# Patient Record
Sex: FEMALE | Race: WHITE | Employment: PART TIME | URBAN - METROPOLITAN AREA
[De-identification: names, ages, dates, MRNs, and addresses within clinical notes are randomized per-mention and may not be internally consistent; named-entity substitution may affect disease eponyms.]

---

## 2017-01-17 ENCOUNTER — HOSPITAL ENCOUNTER (OUTPATIENT)
Dept: MAMMOGRAPHY | Age: 62
Discharge: HOME | End: 2017-01-17
Attending: NURSE PRACTITIONER
Payer: COMMERCIAL

## 2017-01-17 DIAGNOSIS — Z12.31 ENCOUNTER FOR SCREENING MAMMOGRAM FOR BREAST CANCER: ICD-10-CM

## 2017-01-17 PROCEDURE — 77067 SCR MAMMO BI INCL CAD: CPT

## 2017-01-19 NOTE — PROGRESS NOTES
The pt returned my call. She stated she had not been notified of her results. So, I notified the pt of the results/recommendations. She stated she really did not see the need for the MRI, just b/c the AMA made a new recommendation. She would like to do the Dx Mammogram and possible Ultrasound FIRST, and the MRI if something shows up on the Dx Mammogram, but until then she would like to hold off on the MRI. I told the pt I would call [facility] to see if they had already scheduled these tests, and if not we would set them up.

## 2017-01-19 NOTE — PROGRESS NOTES
I called [facility] [place] [facility] Radiology Scheduling ([phone]). I asked if the Dx Mammo, Ultrasound, and MRI had been scheduled. The radiology tech stated there is a que in for the  to call the pt and set these up. I notified her at that point that the pt does not want to have the MRI done at this time. She stated she would notate this in the chart. I called and notified the pt that someone from [address] [place] [address] should be calling her in the next day or two. I told her if she does not hear from them to call us back and we will set this up. The pt verbalized understanding.

## 2017-01-19 NOTE — PROGRESS NOTES
Please call patient and see if she has been contacted by Andrea Diaz for additional imaging of her right breast for further evaluation. Also, they recommend an MRI of breast please see if they have arranged this.    Thank you

## 2017-01-26 ENCOUNTER — HOSPITAL ENCOUNTER (OUTPATIENT)
Dept: MAMMOGRAPHY | Age: 62
Discharge: HOME | End: 2017-01-26
Attending: NURSE PRACTITIONER
Payer: COMMERCIAL

## 2017-01-26 DIAGNOSIS — Z80.3 FH: BREAST CANCER IN FIRST DEGREE RELATIVE WHEN <50 YEARS OLD: ICD-10-CM

## 2017-01-26 DIAGNOSIS — R92.8 ABNORMAL SCREENING MAMMOGRAM: ICD-10-CM

## 2017-01-26 PROCEDURE — 77065 DX MAMMO INCL CAD UNI: CPT

## 2017-01-26 PROCEDURE — 76642 ULTRASOUND BREAST LIMITED: CPT

## 2017-01-26 NOTE — PROGRESS NOTES
I called and notified the pts , Michael Caruso (on release), of these results, as the pt was not at home when I called. He verbalized understanding and agreed to pass the information along to the pt.

## 2017-01-26 NOTE — PROGRESS NOTES
Please let patient know that I reviewed her additional views of diagnostic mammogram and US-not suspicious for malignancy. Recommend a bilateral screening mammogram in one year.    Thank you

## 2018-01-26 ENCOUNTER — HOSPITAL ENCOUNTER (OUTPATIENT)
Dept: PHYSICAL THERAPY | Age: 63
Discharge: HOME | End: 2018-01-26
Attending: FAMILY MEDICINE
Payer: COMMERCIAL

## 2018-01-26 DIAGNOSIS — M53.3 SACRO-ILIAC PAIN: ICD-10-CM

## 2018-01-26 DIAGNOSIS — M54.6 CHRONIC BILATERAL THORACIC BACK PAIN: ICD-10-CM

## 2018-01-26 DIAGNOSIS — G89.29 CHRONIC BILATERAL THORACIC BACK PAIN: ICD-10-CM

## 2018-01-26 PROCEDURE — 97110 THERAPEUTIC EXERCISES: CPT

## 2018-01-26 PROCEDURE — 97162 PT EVAL MOD COMPLEX 30 MIN: CPT

## 2018-01-26 NOTE — PROGRESS NOTES
Ambulatory/Rehab Services H2 Model Falls Risk Assessment    Risk Factor Pts. ·   Confusion/Disorientation/Impulsivity  []    4 ·   Symptomatic Depression  []   2 ·   Altered Elimination  []   1 ·   Dizziness/Vertigo  []   1 ·   Gender (Male)  []   1 ·   Any administered antiepileptics (anticonvulsants):  []   2 ·   Any administered benzodiazepines:  []   1 ·   Visual Impairment (specify):  []   1 ·   Portable Oxygen Use  []   1 ·   Orthostatic ? BP  []   1 ·   History of Recent Falls (within 3 mos.)  []   5     Ability to Rise from Chair (choose one) Pts. ·   Ability to rise in a single movement  [x]   0 ·   Pushes up, successful in one attempt  []   1 ·   Multiple attempts, but successful  []   3 ·   Unable to rise without assistance  []   4   Total: (5 or greater = High Risk) 0     Falls Prevention Plan:   []                Physical Limitations to Exercise (specify):   []                Mobility Assistance Device (type):   []                Exercise/Equipment Adaptation (specify):    ©2010 [facility] [address] Patent #[id number].  Federal Law prohibits the replication, distribution or use without written permission from [facility] [employer]

## 2018-01-26 NOTE — THERAPY EVALUATION
King Harrison  : 1955  Primary: Monnie Moritz [facility]  Secondary:  [address] at [place] [id number] [facility], [address] [place] [address],[address], [facility] [address].  [phone]   [phone]          OUTPATIENT PHYSICAL THERAPY:Initial Assessment 2018    ICD-10: Treatment Diagnosis: low back pain M54.5  Precautions/Allergies:   Review of patient's allergies indicates no known allergies. Fall Risk Score: 0 (? 5 = High Risk)  MD Orders: Eval and treat MEDICAL/REFERRING DIAGNOSIS:  Chronic bilateral thoracic back pain [M54.6, G89.29]  Sacro-iliac pain [M53.3]   DATE OF ONSET: 18  REFERRING PHYSICIAN: Jackie Saldivar MD  RETURN PHYSICIAN APPOINTMENT: TBD     INITIAL ASSESSMENT:  Ms. Tran Monte presents with tender and hypertonic L lumbar paraspinals and L glutes at iliac crest, poor glute strength and activation, decreased TA strength and excessive lumbar lordosis that limits ability to bend over and perform daily activities. Pt would benefit from skilled therapy to address these deficits for return to previous level of function. PROBLEM LIST (Impacting functional limitations):  1. Decreased Strength  2. Decreased ADL/Functional Activities  3. Increased Pain  4. Decreased Flexibility/Joint Mobility  5. Decreased [place] with Home Exercise Program INTERVENTIONS PLANNED:  1. Cold  2. Electrical Stimulation  3. Heat  4. Home Exercise Program (HEP)  5. Manual Therapy  6. Range of Motion (ROM)  7. Therapeutic Exercise/Strengthening   TREATMENT PLAN:  Effective Dates: 18 TO 18. Frequency/Duration: 2 times a week for 6 weeks  GOALS: (Goals have been discussed and agreed upon with patient.)  Short-Term Functional Goals: Time Frame: 4 weeks  1. Pt will be I with HEP to allow for continued progress with symptom management.   2. Pt will demonstrate 15 glute bridges without hamstring activation to demonstrate improved neuromuscular utilization of glutes for functional activities. Discharge Goals: Time Frame: 6 weeks  1. Pt will improve VIPIN score to <10 to reflect an improvement in function. 2. Pt will improve c/o pain to 2/10 to allow for improved tolerance for work duties. 3. Pt will improve glute strength to 5/5 for improved SIJ stability during functional activities. Rehabilitation Potential For Stated Goals: Good  Regarding Dea Schafer's therapy, I certify that the treatment plan above will be carried out by a therapist or under their direction. Thank you for this referral,  Malick Jane PT     Referring Physician Signature: America Maharaj MD              Date                    The information in this section was collected on 01-26-18 (except where otherwise noted). HISTORY:   History of Present Injury/Illness (Reason for Referral):  Pt is a 65 y.o. Female presenting to PT with chronic LBP. 3 weeks ago lifted something that was pretty heavy and then had some pain during intercourse and has had some pain in her left sided low back. Pain has improved since onset. In the beginning pain was a sharp catching pain and had trouble standing and walking. Is currently taking ibuprofen and meloxicam.  Has trouble finding a comfortable position but often sleeps on her side. Has been diagnosed with scoliosis in the past.  Has also been told that she has 1/4-1/2 LLI with L shorter. Has been diagnosed with osteopenia  Past Medical History/Comorbidities:   Ms. Kristal Noyola  has a past medical history of Anxiety state, unspecified; Carpal tunnel syndrome; Essential hypertension, benign; Insomnia, unspecified; Pain in joint, multiple sites; Plantar fascial fibromatosis; Postmenopausal atrophic vaginitis; and Unspecified hypothyroidism. Ms. Kristal Noyola  has a past surgical history that includes hx carpal tunnel release (Bilateral, 8/2010) and hx cataract removal (8/06).   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 , Disp: 30 Tab, Rfl: 0    temazepam (RESTORIL) 30 mg capsule, Take 1 Cap by mouth nightly as needed for Sleep. Max Daily Amount: 30 mg., Disp: 90 Cap, Rfl: 1    conjugated estrogens (PREMARIN) 0.625 mg/gram vaginal cream, Insert 1.5 g into vagina daily. , Disp: 45 g, Rfl: 11    losartan (COZAAR) 100 mg tablet, TAKE 1 TABLET BY MOUTH EVERY DAY, Disp: 90 Tab, Rfl: 3   Date Last Reviewed:  1/26/2018    Number of Personal Factors/Comorbidities that affect the Plan of Care: 1-2: MODERATE COMPLEXITY   EXAMINATION:   Observation/Orthostatic Postural Assessment:          Excessive lumbar lordosis  Palpation:          Tenderness and hypertonicity in L lumbar and thoracic paraspinals and glutes  T4-5 improved symptoms with grade III CPA  ROM:            LUMBAR SPINE    AROM    Flexion   Extension  Right Rotation  Left Rotation  Right Sidebend  Left Sidebend 90 %  80 % pain on L  80 % pain on L  80 %  65 %  65 % pain on L        Strength:          LE- 5/5 bilaterally  Except glutes 4/5  TA- 4/5  Special Tests:          SIJ CPR-  Weakly positive  Slump-  Negative  Functional Mobility:         WNL   Body Structures Involved:  1. Muscles Body Functions Affected:  1. Sensory/Pain  2. Movement Related Activities and Participation Affected:  1. General Tasks and Demands  2. Mobility   Number of elements (examined above) that affect the Plan of Care: 3: MODERATE COMPLEXITY   CLINICAL PRESENTATION:   Presentation: Evolving clinical presentation with changing clinical characteristics: MODERATE COMPLEXITY   CLINICAL DECISION MAKING:   Outcome Measure:    Tool Used: Modified Oswestry Low Back Pain Questionnaire  Score:  Initial: 15/50  Most Recent: X/50 (Date: -- )   Interpretation of Score: Each section is scored on a 0-5 scale, 5 representing the greatest disability. The scores of each section are added together for a total score of 50. Score 0 1-10 11-20 21-30 31-40 41-49 50   Modifier CH CI CJ CK CL CM CN     ? Mobility - Walking and Moving Around:     - CURRENT STATUS: CJ - 20%-39% impaired, limited or restricted    - GOAL STATUS: CI - 1%-19% impaired, limited or restricted    - D/C STATUS:  ---------------To be determined---------------    Medical Necessity:   · Patient is expected to demonstrate progress in strength and range of motion to return to previous level of function. Reason for Services/Other Comments:  · Patient continues to require present interventions due to patient's inability to prolonged stand. Use of outcome tool(s) and clinical judgement create a POC that gives a: Questionable prediction of patient's progress: MODERATE COMPLEXITY            TREATMENT:   (In addition to Assessment/Re-Assessment sessions the following treatments were rendered)  Pre-treatment Symptoms/Complaints:  Low back pain  Pain: Initial:   Pain Intensity 1: 5  Post Session:  4/10     THERAPEUTIC EXERCISE: (15 minutes):  Exercises per grid below to improve mobility and strength. Required moderate visual, verbal and manual cues to promote proper body alignment, promote proper body posture and promote proper body mechanics. Progressed resistance, range and repetitions as indicated. Date:  01-26-18 Date:   Date:     Activity/Exercise Parameters Parameters Parameters   SIJ MET resisted R hip flexion and L hip ext 5x5 sec hold     bridge 20 reps     Tennis ball paraspinal and glute release 5 min                                [employer] [facility]  Treatment/Session Assessment:    · Response to Treatment:  Pt would benefit from skilled therapy to address the aforementioned deficits that limit functional ability to return to previous level of functon . · Compliance with Program/Exercises:  Will assess as treatment progresses. · Recommendations/Intent for next treatment session: \"Next visit will focus on advancements to more challenging activities\".   Total Treatment Duration:  PT Patient Time In/Time Out  Time In: 2136  Time Out: [address], [place]

## 2018-01-30 ENCOUNTER — HOSPITAL ENCOUNTER (OUTPATIENT)
Dept: PHYSICAL THERAPY | Age: 63
Discharge: HOME | End: 2018-01-30
Attending: FAMILY MEDICINE
Payer: COMMERCIAL

## 2018-01-30 PROCEDURE — 97140 MANUAL THERAPY 1/> REGIONS: CPT

## 2018-01-30 PROCEDURE — 97110 THERAPEUTIC EXERCISES: CPT

## 2018-01-30 NOTE — PROGRESS NOTES
Marina Mujica  : 1955  Primary: Kai Rico  Secondary:  [address] [facility] [address] [place] [address], [address] [facility]  [phone]   [phone]          OUTPATIENT PHYSICAL THERAPY:Daily Note 2018    ICD-10: Treatment Diagnosis: low back pain M54.5  Precautions/Allergies:   Review of patient's allergies indicates no known allergies. Fall Risk Score: 0 (? 5 = High Risk)  MD Orders: Eval and treat MEDICAL/REFERRING DIAGNOSIS:  Pain in thoracic spine [M54.6]  Other chronic pain [G89.29]  Sacrococcygeal disorders, not elsewhere classified [M53.3]   DATE OF ONSET: 18  REFERRING PHYSICIAN: Sil Thorpe MD  RETURN PHYSICIAN APPOINTMENT: TBD     INITIAL ASSESSMENT:  Ms. Marilyn England presents with tender and hypertonic L lumbar paraspinals and L glutes at iliac crest, poor glute strength and activation, decreased TA strength and excessive lumbar lordosis that limits ability to bend over and perform daily activities. Pt would benefit from skilled therapy to address these deficits for return to previous level of function. PROBLEM LIST (Impacting functional limitations):  1. Decreased Strength  2. Decreased ADL/Functional Activities  3. Increased Pain  4. Decreased Flexibility/Joint Mobility  5. Decreased [place] with Home Exercise Program INTERVENTIONS PLANNED:  1. Cold  2. Electrical Stimulation  3. Heat  4. Home Exercise Program (HEP)  5. Manual Therapy  6. Range of Motion (ROM)  7. Therapeutic Exercise/Strengthening   TREATMENT PLAN:  Effective Dates: 18 TO 18. Frequency/Duration: 2 times a week for 6 weeks  GOALS: (Goals have been discussed and agreed upon with patient.)  Short-Term Functional Goals: Time Frame: 4 weeks  1. Pt will be I with HEP to allow for continued progress with symptom management.   2. Pt will demonstrate 15 glute bridges without hamstring activation to demonstrate improved neuromuscular utilization of glutes for functional activities. Discharge Goals: Time Frame: 6 weeks  1. Pt will improve VIPIN score to <10 to reflect an improvement in function. 2. Pt will improve c/o pain to 2/10 to allow for improved tolerance for work duties. 3. Pt will improve glute strength to 5/5 for improved SIJ stability during functional activities. Rehabilitation Potential For Stated Goals: Good            The information in this section was collected on 01-26-18 (except where otherwise noted). HISTORY:   History of Present Injury/Illness (Reason for Referral):  Pt is a 65 y.o. Female presenting to PT with chronic LBP. 3 weeks ago lifted something that was pretty heavy and then had some pain during intercourse and has had some pain in her left sided low back. Pain has improved since onset. In the beginning pain was a sharp catching pain and had trouble standing and walking. Is currently taking ibuprofen and meloxicam.  Has trouble finding a comfortable position but often sleeps on her side. Has been diagnosed with scoliosis in the past.  Has also been told that she has 1/4-1/2 LLI with L shorter. Has been diagnosed with osteopenia  Past Medical History/Comorbidities:   Ms. Tyrell Parham  has a past medical history of Anxiety state, unspecified; Carpal tunnel syndrome; Essential hypertension, benign; Insomnia, unspecified; Pain in joint, multiple sites; Plantar fascial fibromatosis; Postmenopausal atrophic vaginitis; and Unspecified hypothyroidism. Ms. Tyrell Parham  has a past surgical history that includes hx carpal tunnel release (Bilateral, 8/2010) and hx cataract removal (8/06). 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 , Disp: 30 Tab, Rfl: 0    temazepam (RESTORIL) 30 mg capsule, Take 1 Cap by mouth nightly as needed for Sleep. Max Daily Amount: 30 mg., Disp: 90 Cap, Rfl: 1    conjugated estrogens (PREMARIN) 0.625 mg/gram vaginal cream, Insert 1.5 g into vagina daily. , Disp: 45 g, Rfl: 11    losartan (COZAAR) 100 mg tablet, TAKE 1 TABLET BY MOUTH EVERY DAY, Disp: 90 Tab, Rfl: 3   Date Last Reviewed:  1/30/2018    Number of Personal Factors/Comorbidities that affect the Plan of Care: 1-2: MODERATE COMPLEXITY   EXAMINATION:   Observation/Orthostatic Postural Assessment:          Excessive lumbar lordosis  Palpation:          Tenderness and hypertonicity in L lumbar and thoracic paraspinals and glutes        T4-5 improved symptoms with grade III CPA  ROM:          LUMBAR SPINE    AROM    Flexion   Extension  Right Rotation  Left Rotation  Right Sidebend  Left Sidebend 90 %  80 % pain on L  80 % pain on L  80 %  65 %  65 % pain on L     Strength:          LE- 5/5 bilaterally  Except glutes 4/5       TA- 4/5  Special Tests:          SIJ CPR-  Weakly positive        Slump-  Negative  Functional Mobility:         WNL   Body Structures Involved:  1. Muscles Body Functions Affected:  1. Sensory/Pain  2. Movement Related Activities and Participation Affected:  1. General Tasks and Demands  2. Mobility   Number of elements (examined above) that affect the Plan of Care: 3: MODERATE COMPLEXITY   CLINICAL PRESENTATION:   Presentation: Evolving clinical presentation with changing clinical characteristics: MODERATE COMPLEXITY   CLINICAL DECISION MAKING:   Outcome Measure: Tool Used: Modified Oswestry Low Back Pain Questionnaire  Score:  Initial: 15/50  Most Recent: X/50 (Date: -- )   Interpretation of Score: Each section is scored on a 0-5 scale, 5 representing the greatest disability. The scores of each section are added together for a total score of 50. Score 0 1-10 11-20 21-30 31-40 41-49 50   Modifier CH CI CJ CK CL CM CN     ?  Mobility - Walking and Moving Around:     - CURRENT STATUS: CJ - 20%-39% impaired, limited or restricted    - GOAL STATUS: CI - 1%-19% impaired, limited or restricted    - D/C STATUS:  ---------------To be determined---------------    Medical Necessity:   · Patient is expected to demonstrate progress in strength and range of motion to return to previous level of function. Reason for Services/Other Comments:  · Patient continues to require present interventions due to patient's inability to prolonged stand. Use of outcome tool(s) and clinical judgement create a POC that gives a: Questionable prediction of patient's progress: MODERATE COMPLEXITY            TREATMENT:   (In addition to Assessment/Re-Assessment sessions the following treatments were rendered)  Pre-treatment Symptoms/Complaints:  1/30/2018: Patient reports she feels that the HEP did help her pain. Pain: Initial: Pain Intensity 1: 5  Post Session:  4/10     THERAPEUTIC EXERCISE: (30 minutes):  Exercises per grid below to improve mobility and strength. Required moderate visual, verbal and manual cues to promote proper body alignment, promote proper body posture and promote proper body mechanics. Progressed resistance, range and repetitions as indicated. Date:  01-26-18 Date:  1/30/2018 Date:     Activity/Exercise Parameters Parameters Parameters   SIJ MET resisted R hip flexion and L hip ext 5x5 sec hold 5 reps  5 second hold    bridge 20 reps 20 reps    Tennis ball paraspinal and glute release 5 min HEP    Nu-step  6 minutes  Level 4    Standing theraband rows  Red t-band  15 reps    Standing lat pulls  Red t-band  15 reps    Active hamstring stretch  10 second hold  2 reps  B LE    Hooklying posterior thigh/piriformis stretch  5 second push  5 second pull  3 reps each  B LE       MANUAL THERAPY: (15 minutes): Soft tissue mobilization was utilized and necessary because of the patient's painful spasm and restricted motion of soft tissue. WhoKnows  Treatment/Session Assessment:    · Response to Treatment:  Patient tolerated treatment without complaints of increased pain. Patient verbalized and demonstrated understanding of HEP. · Compliance with Program/Exercises: Will assess as treatment progresses. · Recommendations/Intent for next treatment session: \"Next visit will focus on advancements to more challenging activities\".   Total Treatment Duration:  PT Patient Time In/Time Out  Time In: 1700  Time Out: [address], PT

## 2018-02-01 ENCOUNTER — HOSPITAL ENCOUNTER (OUTPATIENT)
Dept: PHYSICAL THERAPY | Age: 63
Discharge: HOME | End: 2018-02-01
Attending: FAMILY MEDICINE
Payer: COMMERCIAL

## 2018-02-01 PROCEDURE — 97110 THERAPEUTIC EXERCISES: CPT

## 2018-02-01 PROCEDURE — 97140 MANUAL THERAPY 1/> REGIONS: CPT

## 2018-02-01 NOTE — PROGRESS NOTES
Sky Balbuena  : 1955  Primary: Tatianna Rico  Secondary:  [address] at [place] [id number] [facility], [address] [place] [address],[address], [facility] [address].  [phone]   [phone]          OUTPATIENT PHYSICAL THERAPY:Daily Note 2018    ICD-10: Treatment Diagnosis: low back pain M54.5  Precautions/Allergies:   Review of patient's allergies indicates no known allergies. Fall Risk Score: 0 (? 5 = High Risk)  MD Orders: Eval and treat MEDICAL/REFERRING DIAGNOSIS:  Pain in thoracic spine [M54.6]  Other chronic pain [G89.29]  Sacrococcygeal disorders, not elsewhere classified [M53.3]   DATE OF ONSET: 18  REFERRING PHYSICIAN: Meghan Stiles MD  RETURN PHYSICIAN APPOINTMENT: TBD     INITIAL ASSESSMENT:  Ms. Quynh Hicks presents with tender and hypertonic L lumbar paraspinals and L glutes at iliac crest, poor glute strength and activation, decreased TA strength and excessive lumbar lordosis that limits ability to bend over and perform daily activities. Pt would benefit from skilled therapy to address these deficits for return to previous level of function. PROBLEM LIST (Impacting functional limitations):  1. Decreased Strength  2. Decreased ADL/Functional Activities  3. Increased Pain  4. Decreased Flexibility/Joint Mobility  5. Decreased [place] with Home Exercise Program INTERVENTIONS PLANNED:  1. Cold  2. Electrical Stimulation  3. Heat  4. Home Exercise Program (HEP)  5. Manual Therapy  6. Range of Motion (ROM)  7. Therapeutic Exercise/Strengthening   TREATMENT PLAN:  Effective Dates: 18 TO 18. Frequency/Duration: 2 times a week for 6 weeks  GOALS: (Goals have been discussed and agreed upon with patient.)  Short-Term Functional Goals: Time Frame: 4 weeks  1. Pt will be I with HEP to allow for continued progress with symptom management.   2. Pt will demonstrate 15 glute bridges without hamstring activation to demonstrate improved neuromuscular utilization of glutes for functional activities. Discharge Goals: Time Frame: 6 weeks  1. Pt will improve VIPIN score to <10 to reflect an improvement in function. 2. Pt will improve c/o pain to 2/10 to allow for improved tolerance for work duties. 3. Pt will improve glute strength to 5/5 for improved SIJ stability during functional activities. Rehabilitation Potential For Stated Goals: Good            The information in this section was collected on 01-26-18 (except where otherwise noted). HISTORY:   History of Present Injury/Illness (Reason for Referral):  Pt is a 65 y.o. Female presenting to PT with chronic LBP. 3 weeks ago lifted something that was pretty heavy and then had some pain during intercourse and has had some pain in her left sided low back. Pain has improved since onset. In the beginning pain was a sharp catching pain and had trouble standing and walking. Is currently taking ibuprofen and meloxicam.  Has trouble finding a comfortable position but often sleeps on her side. Has been diagnosed with scoliosis in the past.  Has also been told that she has 1/4-1/2 LLI with L shorter. Has been diagnosed with osteopenia  Past Medical History/Comorbidities:   Ms. Nanda Multani  has a past medical history of Anxiety state, unspecified; Carpal tunnel syndrome; Essential hypertension, benign; Insomnia, unspecified; Pain in joint, multiple sites; Plantar fascial fibromatosis; Postmenopausal atrophic vaginitis; and Unspecified hypothyroidism. Ms. Nanda Multani  has a past surgical history that includes hx carpal tunnel release (Bilateral, 8/2010) and hx cataract removal (8/06). 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 , Disp: 30 Tab, Rfl: 0    temazepam (RESTORIL) 30 mg capsule, Take 1 Cap by mouth nightly as needed for Sleep. Max Daily Amount: 30 mg., Disp: 90 Cap, Rfl: 1    conjugated estrogens (PREMARIN) 0.625 mg/gram vaginal cream, Insert 1.5 g into vagina daily. , Disp: 45 g, Rfl: 11    losartan (COZAAR) 100 mg tablet, TAKE 1 TABLET BY MOUTH EVERY DAY, Disp: 90 Tab, Rfl: 3   Date Last Reviewed:  2/1/2018    Number of Personal Factors/Comorbidities that affect the Plan of Care: 1-2: MODERATE COMPLEXITY   EXAMINATION:   Observation/Orthostatic Postural Assessment:          Excessive lumbar lordosis  Palpation:          Tenderness and hypertonicity in L lumbar and thoracic paraspinals and glutes        T4-5 improved symptoms with grade III CPA  ROM:          LUMBAR SPINE    AROM    Flexion   Extension  Right Rotation  Left Rotation  Right Sidebend  Left Sidebend 90 %  80 % pain on L  80 % pain on L  80 %  65 %  65 % pain on L     Strength:          LE- 5/5 bilaterally  Except glutes 4/5       TA- 4/5  Special Tests:          SIJ CPR-  Weakly positive        Slump-  Negative  Functional Mobility:         WNL   Body Structures Involved:  1. Muscles Body Functions Affected:  1. Sensory/Pain  2. Movement Related Activities and Participation Affected:  1. General Tasks and Demands  2. Mobility   Number of elements (examined above) that affect the Plan of Care: 3: MODERATE COMPLEXITY   CLINICAL PRESENTATION:   Presentation: Evolving clinical presentation with changing clinical characteristics: MODERATE COMPLEXITY   CLINICAL DECISION MAKING:   Outcome Measure: Tool Used: Modified Oswestry Low Back Pain Questionnaire  Score:  Initial: 15/50  Most Recent: X/50 (Date: -- )   Interpretation of Score: Each section is scored on a 0-5 scale, 5 representing the greatest disability. The scores of each section are added together for a total score of 50. Score 0 1-10 11-20 21-30 31-40 41-49 50   Modifier CH CI CJ CK CL CM CN     ?  Mobility - Walking and Moving Around:     - CURRENT STATUS: CJ - 20%-39% impaired, limited or restricted    - GOAL STATUS: CI - 1%-19% impaired, limited or restricted    - D/C STATUS:  ---------------To be determined---------------    Medical Necessity:   · Patient is expected to demonstrate progress in strength and range of motion to return to previous level of function. Reason for Services/Other Comments:  · Patient continues to require present interventions due to patient's inability to prolonged stand. Use of outcome tool(s) and clinical judgement create a POC that gives a: Questionable prediction of patient's progress: MODERATE COMPLEXITY            TREATMENT:   (In addition to Assessment/Re-Assessment sessions the following treatments were rendered)  Pre-treatment Symptoms/Complaints:  2/1/2018: Patient reports that she has not seen much change and still has fatigue and soreness in her low back at the end of the day. Pain: Initial: Pain Intensity 1: 5  Post Session:  4/10     THERAPEUTIC EXERCISE: (30 minutes):  Exercises per grid below to improve mobility and strength. Required moderate visual, verbal and manual cues to promote proper body alignment, promote proper body posture and promote proper body mechanics. Progressed resistance, range and repetitions as indicated.    Date:  01-26-18 Date:  1/30/2018 Date:  02-01-18   Activity/Exercise Parameters Parameters Parameters   SIJ MET resisted R hip flexion and L hip ext 5x5 sec hold 5 reps  5 second hold 5x5 sec hold   bridge 20 reps 20 reps 20 reps   Tennis ball paraspinal and glute release 5 min HEP    Nu-step  6 minutes  Level 4 6 min level 4.0   Standing theraband rows  Red t-band  15 reps Green x 20 reps   Standing lat pulls  Red t-band  15 reps Green x 20 reps   Active hamstring stretch  10 second hold  2 reps  B LE    Hooklying posterior thigh/piriformis stretch  5 second push  5 second pull  3 reps each  B LE 3x30 sec hold   clamshell   20 reps                              MANUAL THERAPY: (15 minutes): Soft tissue mobilization was utilized and necessary because of the patient's painful spasm and restricted motion of soft tissue. [facility] Portal  Treatment/Session Assessment:    · Response to Treatment:  Patient is responding well to early intervention. · Compliance with Program/Exercises: Will assess as treatment progresses. · Recommendations/Intent for next treatment session: \"Next visit will focus on advancements to more challenging activities\".   Total Treatment Duration:  PT Patient Time In/Time Out  Time In: 1630  Time Out: 120 [facility]

## 2018-02-06 ENCOUNTER — HOSPITAL ENCOUNTER (OUTPATIENT)
Dept: PHYSICAL THERAPY | Age: 63
Discharge: HOME | End: 2018-02-06
Attending: FAMILY MEDICINE
Payer: COMMERCIAL

## 2018-02-06 PROCEDURE — 97140 MANUAL THERAPY 1/> REGIONS: CPT

## 2018-02-06 PROCEDURE — 97110 THERAPEUTIC EXERCISES: CPT

## 2018-02-06 NOTE — PROGRESS NOTES
Sarah Dasilva  : 1955  Primary: Elia Rico  Secondary:  [address] at [place] [id number] [facility], [address] [place] [address],[address], [facility] [address].  [phone]   [phone]          OUTPATIENT PHYSICAL THERAPY:Daily Note 2018    ICD-10: Treatment Diagnosis: low back pain M54.5  Precautions/Allergies:   Review of patient's allergies indicates no known allergies. Fall Risk Score: 0 (? 5 = High Risk)  MD Orders: Eval and treat MEDICAL/REFERRING DIAGNOSIS:  Pain in thoracic spine [M54.6]  Other chronic pain [G89.29]  Sacrococcygeal disorders, not elsewhere classified [M53.3]   DATE OF ONSET: 18  REFERRING PHYSICIAN: Amisha Atwood MD  RETURN PHYSICIAN APPOINTMENT: TBD     INITIAL ASSESSMENT:  Ms. Rob Brasher presents with tender and hypertonic L lumbar paraspinals and L glutes at iliac crest, poor glute strength and activation, decreased TA strength and excessive lumbar lordosis that limits ability to bend over and perform daily activities. Pt would benefit from skilled therapy to address these deficits for return to previous level of function. PROBLEM LIST (Impacting functional limitations):  1. Decreased Strength  2. Decreased ADL/Functional Activities  3. Increased Pain  4. Decreased Flexibility/Joint Mobility  5. Decreased [place] with Home Exercise Program INTERVENTIONS PLANNED:  1. Cold  2. Electrical Stimulation  3. Heat  4. Home Exercise Program (HEP)  5. Manual Therapy  6. Range of Motion (ROM)  7. Therapeutic Exercise/Strengthening   TREATMENT PLAN:  Effective Dates: 18 TO 18. Frequency/Duration: 2 times a week for 6 weeks  GOALS: (Goals have been discussed and agreed upon with patient.)  Short-Term Functional Goals: Time Frame: 4 weeks  1. Pt will be I with HEP to allow for continued progress with symptom management.   2. Pt will demonstrate 15 glute bridges without hamstring activation to demonstrate improved neuromuscular utilization of glutes for functional activities. Discharge Goals: Time Frame: 6 weeks  1. Pt will improve VIPIN score to <10 to reflect an improvement in function. 2. Pt will improve c/o pain to 2/10 to allow for improved tolerance for work duties. 3. Pt will improve glute strength to 5/5 for improved SIJ stability during functional activities. Rehabilitation Potential For Stated Goals: Good            The information in this section was collected on 01-26-18 (except where otherwise noted). HISTORY:   History of Present Injury/Illness (Reason for Referral):  Pt is a 65 y.o. Female presenting to PT with chronic LBP. 3 weeks ago lifted something that was pretty heavy and then had some pain during intercourse and has had some pain in her left sided low back. Pain has improved since onset. In the beginning pain was a sharp catching pain and had trouble standing and walking. Is currently taking ibuprofen and meloxicam.  Has trouble finding a comfortable position but often sleeps on her side. Has been diagnosed with scoliosis in the past.  Has also been told that she has 1/4-1/2 LLI with L shorter. Has been diagnosed with osteopenia  Past Medical History/Comorbidities:   Ms. Carlos Holm  has a past medical history of Anxiety state, unspecified; Carpal tunnel syndrome; Essential hypertension, benign; Insomnia, unspecified; Pain in joint, multiple sites; Plantar fascial fibromatosis; Postmenopausal atrophic vaginitis; and Unspecified hypothyroidism. Ms. Carlos Holm  has a past surgical history that includes hx carpal tunnel release (Bilateral, 8/2010) and hx cataract removal (8/06). 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 , Disp: 30 Tab, Rfl: 0    temazepam (RESTORIL) 30 mg capsule, Take 1 Cap by mouth nightly as needed for Sleep. Max Daily Amount: 30 mg., Disp: 90 Cap, Rfl: 1    conjugated estrogens (PREMARIN) 0.625 mg/gram vaginal cream, Insert 1.5 g into vagina daily. , Disp: 45 g, Rfl: 11    losartan (COZAAR) 100 mg tablet, TAKE 1 TABLET BY MOUTH EVERY DAY, Disp: 90 Tab, Rfl: 3   Date Last Reviewed:  2/6/2018    Number of Personal Factors/Comorbidities that affect the Plan of Care: 1-2: MODERATE COMPLEXITY   EXAMINATION:   Observation/Orthostatic Postural Assessment:          Excessive lumbar lordosis  Palpation:          Tenderness and hypertonicity in L lumbar and thoracic paraspinals and glutes        T4-5 improved symptoms with grade III CPA  ROM:          LUMBAR SPINE    AROM    Flexion   Extension  Right Rotation  Left Rotation  Right Sidebend  Left Sidebend 90 %  80 % pain on L  80 % pain on L  80 %  65 %  65 % pain on L     Strength:          LE- 5/5 bilaterally  Except glutes 4/5       TA- 4/5  Special Tests:          SIJ CPR-  Weakly positive        Slump-  Negative  Functional Mobility:         WNL   Body Structures Involved:  1. Muscles Body Functions Affected:  1. Sensory/Pain  2. Movement Related Activities and Participation Affected:  1. General Tasks and Demands  2. Mobility   Number of elements (examined above) that affect the Plan of Care: 3: MODERATE COMPLEXITY   CLINICAL PRESENTATION:   Presentation: Evolving clinical presentation with changing clinical characteristics: MODERATE COMPLEXITY   CLINICAL DECISION MAKING:   Outcome Measure: Tool Used: Modified Oswestry Low Back Pain Questionnaire  Score:  Initial: 15/50  Most Recent: X/50 (Date: -- )   Interpretation of Score: Each section is scored on a 0-5 scale, 5 representing the greatest disability. The scores of each section are added together for a total score of 50. Score 0 1-10 11-20 21-30 31-40 41-49 50   Modifier CH CI CJ CK CL CM CN     ?  Mobility - Walking and Moving Around:     - CURRENT STATUS: CJ - 20%-39% impaired, limited or restricted    - GOAL STATUS: CI - 1%-19% impaired, limited or restricted    - D/C STATUS:  ---------------To be determined---------------    Medical Necessity:   · Patient is expected to demonstrate progress in strength and range of motion to return to previous level of function. Reason for Services/Other Comments:  · Patient continues to require present interventions due to patient's inability to prolonged stand. Use of outcome tool(s) and clinical judgement create a POC that gives a: Questionable prediction of patient's progress: MODERATE COMPLEXITY            TREATMENT:   (In addition to Assessment/Re-Assessment sessions the following treatments were rendered)  Pre-treatment Symptoms/Complaints:  2/6/2018: Patient reports that the pain intensity and frequency is better. .  Pain: Initial: Pain Intensity 1: 4  Post Session:  4/10     THERAPEUTIC EXERCISE: (30 minutes):  Exercises per grid below to improve mobility and strength. Required moderate visual, verbal and manual cues to promote proper body alignment, promote proper body posture and promote proper body mechanics. Progressed resistance, range and repetitions as indicated.    Date:  01-26-18 Date:  1/30/2018 Date:  02-01-18 Date:  02-06-18   Activity/Exercise Parameters Parameters Parameters    SIJ MET resisted R hip flexion and L hip ext 5x5 sec hold 5 reps  5 second hold 5x5 sec hold    bridge 20 reps 20 reps 20 reps    Tennis ball paraspinal and glute release 5 min HEP     Nu-step  6 minutes  Level 4 6 min level 4.0 6 min level 4.0   Standing theraband rows  Red t-band  15 reps Green x 20 reps    Standing lat pulls  Red t-band  15 reps Green x 20 reps Green x 20 reps   Active hamstring stretch  10 second hold  2 reps  B LE     Hooklying posterior thigh/piriformis stretch  5 second push  5 second pull  3 reps each  B LE 3x30 sec hold    clamshell   20 reps Blue x 20 reps   March with glutes     2x30 ft   SLR with TA    20 reps   March with TA    20 reps   Isometric oblques    Blue x 20 reps each   Isometric hip flexion    10x5 sec hold each                                  MANUAL THERAPY: (15 minutes): Soft tissue mobilization was utilized and necessary because of the patient's painful spasm and restricted motion of soft tissue. [facility] Portal  Treatment/Session Assessment:    · Response to Treatment:  Patient is progressing well with glute activation. Some difficulty in functional positions. · Compliance with Program/Exercises: Will assess as treatment progresses. · Recommendations/Intent for next treatment session: \"Next visit will focus on advancements to more challenging activities\".   Total Treatment Duration:  PT Patient Time In/Time Out  Time In: 1630  Time Out: [address]

## 2018-02-08 ENCOUNTER — HOSPITAL ENCOUNTER (OUTPATIENT)
Dept: PHYSICAL THERAPY | Age: 63
Discharge: HOME | End: 2018-02-08
Attending: FAMILY MEDICINE
Payer: COMMERCIAL

## 2018-02-08 PROCEDURE — 97110 THERAPEUTIC EXERCISES: CPT

## 2018-02-08 PROCEDURE — 97140 MANUAL THERAPY 1/> REGIONS: CPT

## 2018-02-08 NOTE — PROGRESS NOTES
Sky Balbuena  : 1955  Primary: Tatianna Rico  Secondary:  [address]  at [facility]  [address] [facility] [address]  [phone]   [phone]          OUTPATIENT PHYSICAL THERAPY:Daily Note 2018    ICD-10: Treatment Diagnosis: low back pain M54.5  Precautions/Allergies:   Review of patient's allergies indicates no known allergies. Fall Risk Score: 0 (? 5 = High Risk)  MD Orders: Eval and treat MEDICAL/REFERRING DIAGNOSIS:  Pain in thoracic spine [M54.6]  Other chronic pain [G89.29]  Sacrococcygeal disorders, not elsewhere classified [M53.3]   DATE OF ONSET: 18  REFERRING PHYSICIAN: Meghan Stiles MD  RETURN PHYSICIAN APPOINTMENT: TBD     INITIAL ASSESSMENT:  Ms. Quynh Hicks presents with tender and hypertonic L lumbar paraspinals and L glutes at iliac crest, poor glute strength and activation, decreased TA strength and excessive lumbar lordosis that limits ability to bend over and perform daily activities. Pt would benefit from skilled therapy to address these deficits for return to previous level of function. PROBLEM LIST (Impacting functional limitations):  1. Decreased Strength  2. Decreased ADL/Functional Activities  3. Increased Pain  4. Decreased Flexibility/Joint Mobility  5. Decreased [place] with Home Exercise Program INTERVENTIONS PLANNED:  1. Cold  2. Electrical Stimulation  3. Heat  4. Home Exercise Program (HEP)  5. Manual Therapy  6. Range of Motion (ROM)  7. Therapeutic Exercise/Strengthening   TREATMENT PLAN:  Effective Dates: 18 TO 18. Frequency/Duration: 2 times a week for 6 weeks  GOALS: (Goals have been discussed and agreed upon with patient.)  Short-Term Functional Goals: Time Frame: 4 weeks  1. Pt will be I with HEP to allow for continued progress with symptom management.   2. Pt will demonstrate 15 glute bridges without hamstring activation to demonstrate improved neuromuscular utilization of glutes for functional activities. Discharge Goals: Time Frame: 6 weeks  1. Pt will improve VIPIN score to <10 to reflect an improvement in function. 2. Pt will improve c/o pain to 2/10 to allow for improved tolerance for work duties. 3. Pt will improve glute strength to 5/5 for improved SIJ stability during functional activities. Rehabilitation Potential For Stated Goals: Good            The information in this section was collected on 01-26-18 (except where otherwise noted). HISTORY:   History of Present Injury/Illness (Reason for Referral):  Pt is a 65 y.o. Female presenting to PT with chronic LBP. 3 weeks ago lifted something that was pretty heavy and then had some pain during intercourse and has had some pain in her left sided low back. Pain has improved since onset. In the beginning pain was a sharp catching pain and had trouble standing and walking. Is currently taking ibuprofen and meloxicam.  Has trouble finding a comfortable position but often sleeps on her side. Has been diagnosed with scoliosis in the past.  Has also been told that she has 1/4-1/2 LLI with L shorter. Has been diagnosed with osteopenia  Past Medical History/Comorbidities:   Ms. Tyrell Parham  has a past medical history of Anxiety state, unspecified; Carpal tunnel syndrome; Essential hypertension, benign; Insomnia, unspecified; Pain in joint, multiple sites; Plantar fascial fibromatosis; Postmenopausal atrophic vaginitis; and Unspecified hypothyroidism. Ms. Tyrell Parham  has a past surgical history that includes hx carpal tunnel release (Bilateral, 8/2010) and hx cataract removal (8/06). 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 , Disp: 30 Tab, Rfl: 0    temazepam (RESTORIL) 30 mg capsule, Take 1 Cap by mouth nightly as needed for Sleep. Max Daily Amount: 30 mg., Disp: 90 Cap, Rfl: 1    conjugated estrogens (PREMARIN) 0.625 mg/gram vaginal cream, Insert 1.5 g into vagina daily. , Disp: 45 g, Rfl: 11    losartan (COZAAR) 100 mg tablet, TAKE 1 TABLET BY MOUTH EVERY DAY, Disp: 90 Tab, Rfl: 3   Date Last Reviewed:  2/8/2018    Number of Personal Factors/Comorbidities that affect the Plan of Care: 1-2: MODERATE COMPLEXITY   EXAMINATION:   Observation/Orthostatic Postural Assessment:          Excessive lumbar lordosis  Palpation:          Tenderness and hypertonicity in L lumbar and thoracic paraspinals and glutes        T4-5 improved symptoms with grade III CPA  ROM:          LUMBAR SPINE    AROM    Flexion   Extension  Right Rotation  Left Rotation  Right Sidebend  Left Sidebend 90 %  80 % pain on L  80 % pain on L  80 %  65 %  65 % pain on L     Strength:          LE- 5/5 bilaterally  Except glutes 4/5       TA- 4/5  Special Tests:          SIJ CPR-  Weakly positive        Slump-  Negative  Functional Mobility:         WNL   Body Structures Involved:  1. Muscles Body Functions Affected:  1. Sensory/Pain  2. Movement Related Activities and Participation Affected:  1. General Tasks and Demands  2. Mobility   Number of elements (examined above) that affect the Plan of Care: 3: MODERATE COMPLEXITY   CLINICAL PRESENTATION:   Presentation: Evolving clinical presentation with changing clinical characteristics: MODERATE COMPLEXITY   CLINICAL DECISION MAKING:   Outcome Measure: Tool Used: Modified Oswestry Low Back Pain Questionnaire  Score:  Initial: 15/50  Most Recent: X/50 (Date: -- )   Interpretation of Score: Each section is scored on a 0-5 scale, 5 representing the greatest disability. The scores of each section are added together for a total score of 50. Score 0 1-10 11-20 21-30 31-40 41-49 50   Modifier CH CI CJ CK CL CM CN     ?  Mobility - Walking and Moving Around:     - CURRENT STATUS: CJ - 20%-39% impaired, limited or restricted    - GOAL STATUS: CI - 1%-19% impaired, limited or restricted    - D/C STATUS:  ---------------To be determined---------------    Medical Necessity:   · Patient is expected to demonstrate progress in strength and range of motion to return to previous level of function. Reason for Services/Other Comments:  · Patient continues to require present interventions due to patient's inability to prolonged stand. Use of outcome tool(s) and clinical judgement create a POC that gives a: Questionable prediction of patient's progress: MODERATE COMPLEXITY            TREATMENT:   (In addition to Assessment/Re-Assessment sessions the following treatments were rendered)  Pre-treatment Symptoms/Complaints:  2/8/2018: Patient reports that she felt good for about a day and a half after last treatment but the pain came back today. Pain: Initial: Pain Intensity 1: 4  Post Session:  4/10     THERAPEUTIC EXERCISE: (30 minutes):  Exercises per grid below to improve mobility and strength. Required moderate visual, verbal and manual cues to promote proper body alignment, promote proper body posture and promote proper body mechanics. Progressed resistance, range and repetitions as indicated.    Date:  1/30/2018 Date:  02-01-18 Date:  02-06-18 Date:  02-08-18   Activity/Exercise Parameters Parameters     SIJ MET resisted R hip flexion and L hip ext 5 reps  5 second hold 5x5 sec hold     bridge 20 reps 20 reps  20 reps   Tennis ball paraspinal and glute release HEP      Nu-step 6 minutes  Level 4 6 min level 4.0 6 min level 4.0 6 min level 4.0   Standing theraband rows Red t-band  15 reps Green x 20 reps     Standing lat pulls Red t-band  15 reps Green x 20 reps Green x 20 reps Blue x 20 reps   Active hamstring stretch 10 second hold  2 reps  B LE      Hooklying posterior thigh/piriformis stretch 5 second push  5 second pull  3 reps each  B LE 3x30 sec hold     clamshell  20 reps Blue x 20 reps Blue x 20 reps   March with glutes   2x30 ft 20 reps   SLR with TA   20 reps 20 reps   March with TA   20 reps 20 reps   Isometric oblques   Blue x 20 reps each Blue x 20 reps each   Isometric hip flexion   10x5 sec hold each 10x5 sec hold   Side stepping with band    2x30 ft   Isometric hip ER on wall    10x3 sec hold each                    MANUAL THERAPY: (15 minutes): Soft tissue mobilization was utilized and necessary because of the patient's painful spasm and restricted motion of soft tissue. Rib7 mobilization  [facility]  Treatment/Session Assessment:    · Response to Treatment:  Patient is improving consistently. Some restriction noted in rib that improved with mobilizations. · Compliance with Program/Exercises: Will assess as treatment progresses. · Recommendations/Intent for next treatment session: \"Next visit will focus on advancements to more challenging activities\".   Total Treatment Duration:  PT Patient Time In/Time Out  Time In: 1632  Time Out: [address]

## 2018-02-13 ENCOUNTER — HOSPITAL ENCOUNTER (OUTPATIENT)
Dept: PHYSICAL THERAPY | Age: 63
Discharge: HOME | End: 2018-02-13
Attending: FAMILY MEDICINE
Payer: COMMERCIAL

## 2018-02-13 PROCEDURE — 97110 THERAPEUTIC EXERCISES: CPT

## 2018-02-13 PROCEDURE — 97140 MANUAL THERAPY 1/> REGIONS: CPT

## 2018-02-13 NOTE — PROGRESS NOTES
Alfreda   : 1955  Primary: Rashawn Rico  Secondary:  [address] at [place] [address] [facility], [address] [place] [address],[address], [facility] [address]  [phone]   [phone]          OUTPATIENT PHYSICAL THERAPY:Daily Note 2018    ICD-10: Treatment Diagnosis: low back pain M54.5  Precautions/Allergies:   Review of patient's allergies indicates no known allergies. Fall Risk Score: 0 (? 5 = High Risk)  MD Orders: Eval and treat MEDICAL/REFERRING DIAGNOSIS:  Pain in thoracic spine [M54.6]  Other chronic pain [G89.29]  Sacrococcygeal disorders, not elsewhere classified [M53.3]   DATE OF ONSET: 18  REFERRING PHYSICIAN: Roopa Montes De Oca MD  RETURN PHYSICIAN APPOINTMENT: TBD     INITIAL ASSESSMENT:  Ms. Adi Patricio presents with tender and hypertonic L lumbar paraspinals and L glutes at iliac crest, poor glute strength and activation, decreased TA strength and excessive lumbar lordosis that limits ability to bend over and perform daily activities. Pt would benefit from skilled therapy to address these deficits for return to previous level of function. PROBLEM LIST (Impacting functional limitations):  1. Decreased Strength  2. Decreased ADL/Functional Activities  3. Increased Pain  4. Decreased Flexibility/Joint Mobility  5. Decreased [place] with Home Exercise Program INTERVENTIONS PLANNED:  1. Cold  2. Electrical Stimulation  3. Heat  4. Home Exercise Program (HEP)  5. Manual Therapy  6. Range of Motion (ROM)  7. Therapeutic Exercise/Strengthening   TREATMENT PLAN:  Effective Dates: 18 TO 18. Frequency/Duration: 2 times a week for 6 weeks  GOALS: (Goals have been discussed and agreed upon with patient.)  Short-Term Functional Goals: Time Frame: 4 weeks  1. Pt will be I with HEP to allow for continued progress with symptom management.   2. Pt will demonstrate 15 glute bridges without hamstring activation to demonstrate improved neuromuscular utilization of glutes for functional activities. Discharge Goals: Time Frame: 6 weeks  1. Pt will improve VIPIN score to <10 to reflect an improvement in function. 2. Pt will improve c/o pain to 2/10 to allow for improved tolerance for work duties. 3. Pt will improve glute strength to 5/5 for improved SIJ stability during functional activities. Rehabilitation Potential For Stated Goals: Good            The information in this section was collected on 01-26-18 (except where otherwise noted). HISTORY:   History of Present Injury/Illness (Reason for Referral):  Pt is a 65 y.o. Female presenting to PT with chronic LBP. 3 weeks ago lifted something that was pretty heavy and then had some pain during intercourse and has had some pain in her left sided low back. Pain has improved since onset. In the beginning pain was a sharp catching pain and had trouble standing and walking. Is currently taking ibuprofen and meloxicam.  Has trouble finding a comfortable position but often sleeps on her side. Has been diagnosed with scoliosis in the past.  Has also been told that she has 1/4-1/2 LLI with L shorter. Has been diagnosed with osteopenia  Past Medical History/Comorbidities:   Ms. Parth Waite  has a past medical history of Anxiety state, unspecified; Carpal tunnel syndrome; Essential hypertension, benign; Insomnia, unspecified; Pain in joint, multiple sites; Plantar fascial fibromatosis; Postmenopausal atrophic vaginitis; and Unspecified hypothyroidism. Ms. Parth Waite  has a past surgical history that includes hx carpal tunnel release (Bilateral, 8/2010) and hx cataract removal (8/06).   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 Max Daily Amount: 30 mg., Disp: 90 Cap, Rfl: 1    conjugated estrogens (PREMARIN) 0.625 mg/gram vaginal cream, Insert 1.5 g into vagina daily. , Disp: 45 g, Rfl: 11    losartan (COZAAR) 100 mg tablet, TAKE 1 TABLET BY MOUTH EVERY DAY, Disp: 90 Tab, Rfl: 3   Date Last Reviewed:  2/13/2018    Number of Personal Factors/Comorbidities that affect the Plan of Care: 1-2: MODERATE COMPLEXITY   EXAMINATION:   Observation/Orthostatic Postural Assessment:          Excessive lumbar lordosis  Palpation:          Tenderness and hypertonicity in L lumbar and thoracic paraspinals and glutes        T4-5 improved symptoms with grade III CPA  ROM:          LUMBAR SPINE    AROM    Flexion   Extension  Right Rotation  Left Rotation  Right Sidebend  Left Sidebend 90 %  80 % pain on L  80 % pain on L  80 %  65 %  65 % pain on L     Strength:          LE- 5/5 bilaterally  Except glutes 4/5       TA- 4/5  Special Tests:          SIJ CPR-  Weakly positive        Slump-  Negative  Functional Mobility:         WNL   Body Structures Involved:  1. Muscles Body Functions Affected:  1. Sensory/Pain  2. Movement Related Activities and Participation Affected:  1. General Tasks and Demands  2. Mobility   Number of elements (examined above) that affect the Plan of Care: 3: MODERATE COMPLEXITY   CLINICAL PRESENTATION:   Presentation: Evolving clinical presentation with changing clinical characteristics: MODERATE COMPLEXITY   CLINICAL DECISION MAKING:   Outcome Measure: Tool Used: Modified Oswestry Low Back Pain Questionnaire  Score:  Initial: 15/50  Most Recent: X/50 (Date: -- )   Interpretation of Score: Each section is scored on a 0-5 scale, 5 representing the greatest disability. The scores of each section are added together for a total score of 50.     Score 0 1-10 11-20 21-30 31-40 41-49 50   Modifier CH CI CJ CK CL CM CN ? Mobility - Walking and Moving Around:     - CURRENT STATUS: CJ - 20%-39% impaired, limited or restricted    - GOAL STATUS: CI - 1%-19% impaired, limited or restricted    - D/C STATUS:  ---------------To be determined---------------    Medical Necessity:   · Patient is expected to demonstrate progress in strength and range of motion to return to previous level of function. Reason for Services/Other Comments:  · Patient continues to require present interventions due to patient's inability to prolonged stand. Use of outcome tool(s) and clinical judgement create a POC that gives a: Questionable prediction of patient's progress: MODERATE COMPLEXITY            TREATMENT:   (In addition to Assessment/Re-Assessment sessions the following treatments were rendered)  Pre-treatment Symptoms/Complaints:  2/13/2018: Patient reports that she has changed some bad habits and has seen improvement in her symptoms. .  Pain: Initial: Pain Intensity 1: 3  Post Session:  4/10     THERAPEUTIC EXERCISE: (30 minutes):  Exercises per grid below to improve mobility and strength. Required moderate visual, verbal and manual cues to promote proper body alignment, promote proper body posture and promote proper body mechanics. Progressed resistance, range and repetitions as indicated.    Date:  02-01-18 Date:  02-06-18 Date:  02-08-18 Date:  02-13-18   Activity/Exercise Parameters      SIJ MET resisted R hip flexion and L hip ext 5x5 sec hold      bridge 20 reps  20 reps Figure 4 x 15 reps   Tennis ball paraspinal and glute release       Nu-step 6 min level 4.0 6 min level 4.0 6 min level 4.0 6 min level 4.0   Standing theraband rows Green x 20 reps      Standing lat pulls Green x 20 reps Green x 20 reps Blue x 20 reps Blue x 20 reps   Active hamstring stretch       Hooklying posterior thigh/piriformis stretch 3x30 sec hold      clamshell 20 reps Blue x 20 reps Blue x 20 reps Blue x 20 reps   March with glutes  2x30 ft 20 reps    SLR with TA  20 reps 20 reps 20 reps   March with TA  20 reps 20 reps 20 reps   Isometric oblques  Blue x 20 reps each Blue x 20 reps each Blue x 20 reps   Isometric hip flexion  10x5 sec hold each 10x5 sec hold 10x5 sec hold   Side stepping with band   2x30 ft    Isometric hip ER on wall   10x3 sec hold each    Quadruped rocking    10x10 sec hold each   Alt. UE flexion in quad    20 reps   Cat/cow quadruped    20 reps                           MANUAL THERAPY: (15 minutes): Soft tissue mobilization was utilized and necessary because of the patient's painful spasm and restricted motion of soft tissue. Rib7 mobilization, side lying SIJ mobilization on L  [facility] Portal  Treatment/Session Assessment:    · Response to Treatment:  Patient is demonstrated excellent carryover with HEP and body mechanics. Updated HEP today. · Compliance with Program/Exercises: Will assess as treatment progresses. · Recommendations/Intent for next treatment session: \"Next visit will focus on advancements to more challenging activities\".   Total Treatment Duration:  PT Patient Time In/Time Out  Time In: 1630  Time Out: [address]

## 2018-02-15 ENCOUNTER — HOSPITAL ENCOUNTER (OUTPATIENT)
Dept: PHYSICAL THERAPY | Age: 63
Discharge: HOME | End: 2018-02-15
Attending: FAMILY MEDICINE
Payer: COMMERCIAL

## 2018-02-15 PROCEDURE — 97140 MANUAL THERAPY 1/> REGIONS: CPT

## 2018-02-15 PROCEDURE — 97110 THERAPEUTIC EXERCISES: CPT

## 2018-02-15 NOTE — PROGRESS NOTES
Anthony Sears  : 1955  Primary: Rober Rico  Secondary:  [address]  at [facility]  [address] [facility] [address], [facility] [address]  [phone]   [phone]          OUTPATIENT PHYSICAL THERAPY:Daily Note 2/15/2018    ICD-10: Treatment Diagnosis: low back pain M54.5  Precautions/Allergies:   Review of patient's allergies indicates no known allergies. Fall Risk Score: 0 (? 5 = High Risk)  MD Orders: Eval and treat MEDICAL/REFERRING DIAGNOSIS:  Pain in thoracic spine [M54.6]  Other chronic pain [G89.29]  Sacrococcygeal disorders, not elsewhere classified [M53.3]   DATE OF ONSET: 18  REFERRING PHYSICIAN: Jessica Carlson MD  RETURN PHYSICIAN APPOINTMENT: TBD     INITIAL ASSESSMENT:  Ms. Grover Charles presents with tender and hypertonic L lumbar paraspinals and L glutes at iliac crest, poor glute strength and activation, decreased TA strength and excessive lumbar lordosis that limits ability to bend over and perform daily activities. Pt would benefit from skilled therapy to address these deficits for return to previous level of function. PROBLEM LIST (Impacting functional limitations):  1. Decreased Strength  2. Decreased ADL/Functional Activities  3. Increased Pain  4. Decreased Flexibility/Joint Mobility  5. Decreased [place] with Home Exercise Program INTERVENTIONS PLANNED:  1. Cold  2. Electrical Stimulation  3. Heat  4. Home Exercise Program (HEP)  5. Manual Therapy  6. Range of Motion (ROM)  7. Therapeutic Exercise/Strengthening   TREATMENT PLAN:  Effective Dates: 18 TO 18. Frequency/Duration: 2 times a week for 6 weeks  GOALS: (Goals have been discussed and agreed upon with patient.)  Short-Term Functional Goals: Time Frame: 4 weeks  1. Pt will be I with HEP to allow for continued progress with symptom management.   2. Pt will demonstrate 15 glute bridges without hamstring activation to demonstrate improved neuromuscular utilization of glutes for functional activities. Discharge Goals: Time Frame: 6 weeks  1. Pt will improve VIPIN score to <10 to reflect an improvement in function. 2. Pt will improve c/o pain to 2/10 to allow for improved tolerance for work duties. 3. Pt will improve glute strength to 5/5 for improved SIJ stability during functional activities. Rehabilitation Potential For Stated Goals: Good            The information in this section was collected on 01-26-18 (except where otherwise noted). HISTORY:   History of Present Injury/Illness (Reason for Referral):  Pt is a 65 y.o. Female presenting to PT with chronic LBP. 3 weeks ago lifted something that was pretty heavy and then had some pain during intercourse and has had some pain in her left sided low back. Pain has improved since onset. In the beginning pain was a sharp catching pain and had trouble standing and walking. Is currently taking ibuprofen and meloxicam.  Has trouble finding a comfortable position but often sleeps on her side. Has been diagnosed with scoliosis in the past.  Has also been told that she has 1/4-1/2 LLI with L shorter. Has been diagnosed with osteopenia  Past Medical History/Comorbidities:   Ms. Kandice Rosas  has a past medical history of Anxiety state, unspecified; Carpal tunnel syndrome; Essential hypertension, benign; Insomnia, unspecified; Pain in joint, multiple sites; Plantar fascial fibromatosis; Postmenopausal atrophic vaginitis; and Unspecified hypothyroidism. Ms. Kandice Rosas  has a past surgical history that includes hx carpal tunnel release (Bilateral, 8/2010) and hx cataract removal (8/06).   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 Max Daily Amount: 30 mg., Disp: 90 Cap, Rfl: 1    conjugated estrogens (PREMARIN) 0.625 mg/gram vaginal cream, Insert 1.5 g into vagina daily. , Disp: 45 g, Rfl: 11    losartan (COZAAR) 100 mg tablet, TAKE 1 TABLET BY MOUTH EVERY DAY, Disp: 90 Tab, Rfl: 3   Date Last Reviewed:  2/15/2018    Number of Personal Factors/Comorbidities that affect the Plan of Care: 1-2: MODERATE COMPLEXITY   EXAMINATION:   Observation/Orthostatic Postural Assessment:          Excessive lumbar lordosis  Palpation:          Tenderness and hypertonicity in L lumbar and thoracic paraspinals and glutes        T4-5 improved symptoms with grade III CPA  ROM:          LUMBAR SPINE    AROM    Flexion   Extension  Right Rotation  Left Rotation  Right Sidebend  Left Sidebend 90 %  80 % pain on L  80 % pain on L  80 %  65 %  65 % pain on L     Strength:          LE- 5/5 bilaterally  Except glutes 4/5       TA- 4/5  Special Tests:          SIJ CPR-  Weakly positive        Slump-  Negative  Functional Mobility:         WNL   Body Structures Involved:  1. Muscles Body Functions Affected:  1. Sensory/Pain  2. Movement Related Activities and Participation Affected:  1. General Tasks and Demands  2. Mobility   Number of elements (examined above) that affect the Plan of Care: 3: MODERATE COMPLEXITY   CLINICAL PRESENTATION:   Presentation: Evolving clinical presentation with changing clinical characteristics: MODERATE COMPLEXITY   CLINICAL DECISION MAKING:   Outcome Measure: Tool Used: Modified Oswestry Low Back Pain Questionnaire  Score:  Initial: 15/50  Most Recent: X/50 (Date: -- )   Interpretation of Score: Each section is scored on a 0-5 scale, 5 representing the greatest disability. The scores of each section are added together for a total score of 50.     Score 0 1-10 11-20 21-30 31-40 41-49 50   Modifier CH CI CJ CK CL CM CN ? Mobility - Walking and Moving Around:     - CURRENT STATUS: CJ - 20%-39% impaired, limited or restricted    - GOAL STATUS: CI - 1%-19% impaired, limited or restricted    - D/C STATUS:  ---------------To be determined---------------    Medical Necessity:   · Patient is expected to demonstrate progress in strength and range of motion to return to previous level of function. Reason for Services/Other Comments:  · Patient continues to require present interventions due to patient's inability to prolonged stand. Use of outcome tool(s) and clinical judgement create a POC that gives a: Questionable prediction of patient's progress: MODERATE COMPLEXITY            TREATMENT:   (In addition to Assessment/Re-Assessment sessions the following treatments were rendered)  Pre-treatment Symptoms/Complaints:  2/15/2018: Patient reports that her SIJ was a little sore after sitting all day but her c/c is her ribs today. Pain: Initial: Pain Intensity 1: 3  Post Session:  4/10     THERAPEUTIC EXERCISE: (30 minutes):  Exercises per grid below to improve mobility and strength. Required moderate visual, verbal and manual cues to promote proper body alignment, promote proper body posture and promote proper body mechanics. Progressed resistance, range and repetitions as indicated.    Date:  02-06-18 Date:  02-08-18 Date:  02-13-18 Date:  02-15-18   Activity/Exercise       SIJ MET resisted R hip flexion and L hip ext       bridge  20 reps Figure 4 x 15 reps Figure 4 x 20 reps   Tennis ball paraspinal and glute release       Nu-step 6 min level 4.0 6 min level 4.0 6 min level 4.0 6 min level 4.0   Standing theraband rows       Standing lat pulls Green x 20 reps Blue x 20 reps Blue x 20 reps Blue x 20 reps   Active hamstring stretch       Hooklying posterior thigh/piriformis stretch       clamshell Blue x 20 reps Blue x 20 reps Blue x 20 reps Blue x 20 reps   March with glutes 2x30 ft 20 reps     SLR with TA 20 reps 20 reps 20 reps 20 reps each   March with TA 20 reps 20 reps 20 reps 20 reps each   Isometric oblques Blue x 20 reps each Blue x 20 reps each Blue x 20 reps Blue x 20 reps each   Isometric hip flexion 10x5 sec hold each 10x5 sec hold 10x5 sec hold    Side stepping with band  2x30 ft     Isometric hip ER on wall  10x3 sec hold each  10x3 sec hold each   Quadruped rocking   10x10 sec hold each 10x10 sec hold   Alt. UE flexion in quad   20 reps 20 reps   Cat/cow quadruped   20 reps 20 reps                           MANUAL THERAPY: (15 minutes): Soft tissue mobilization was utilized and necessary because of the patient's painful spasm and restricted motion of soft tissue. Rib7 mobilization, side lying SIJ mobilization on L  MedBridge Portal  Treatment/Session Assessment:    · Response to Treatment:  Patient reported good improvement of symptoms by end of session. Updated HEP. · Compliance with Program/Exercises: Will assess as treatment progresses. · Recommendations/Intent for next treatment session: \"Next visit will focus on advancements to more challenging activities\".   Total Treatment Duration:  PT Patient Time In/Time Out  Time In: 8734  Time Out:  [facility]

## 2018-02-20 ENCOUNTER — HOSPITAL ENCOUNTER (OUTPATIENT)
Dept: PHYSICAL THERAPY | Age: 63
Discharge: HOME | End: 2018-02-20
Attending: FAMILY MEDICINE
Payer: COMMERCIAL

## 2018-02-20 PROCEDURE — 97110 THERAPEUTIC EXERCISES: CPT

## 2018-02-20 PROCEDURE — 97140 MANUAL THERAPY 1/> REGIONS: CPT

## 2018-02-22 ENCOUNTER — HOSPITAL ENCOUNTER (OUTPATIENT)
Dept: PHYSICAL THERAPY | Age: 63
Discharge: HOME | End: 2018-02-22
Attending: FAMILY MEDICINE
Payer: COMMERCIAL

## 2018-02-22 PROCEDURE — 97110 THERAPEUTIC EXERCISES: CPT

## 2018-02-22 PROCEDURE — 97140 MANUAL THERAPY 1/> REGIONS: CPT

## 2018-02-22 NOTE — PROGRESS NOTES
Ernie Amaya  : 1955  [facility]  Secondary:  [address]  at [facility]  [address], [address] [facility] [address], [place] [address]  [phone]   [phone]          OUTPATIENT PHYSICAL THERAPY:Daily Note 2018    ICD-10: Treatment Diagnosis: low back pain M54.5  Precautions/Allergies:   Review of patient's allergies indicates no known allergies. Fall Risk Score: 0 (? 5 = High Risk)  MD Orders: Eval and treat MEDICAL/REFERRING DIAGNOSIS:  Pain in thoracic spine [M54.6]  Other chronic pain [G89.29]  Sacrococcygeal disorders, not elsewhere classified [M53.3]   DATE OF ONSET: 18  REFERRING PHYSICIAN: Miracle Delarosa MD  RETURN PHYSICIAN APPOINTMENT: TBD     INITIAL ASSESSMENT:  Ms. Mahmood presents with tender and hypertonic L lumbar paraspinals and L glutes at iliac crest, poor glute strength and activation, decreased TA strength and excessive lumbar lordosis that limits ability to bend over and perform daily activities. Pt would benefit from skilled therapy to address these deficits for return to previous level of function. PROBLEM LIST (Impacting functional limitations):  1. Decreased Strength  2. Decreased ADL/Functional Activities  3. Increased Pain  4. Decreased Flexibility/Joint Mobility  5. Decreased [place] with Home Exercise Program INTERVENTIONS PLANNED:  1. Cold  2. Electrical Stimulation  3. Heat  4. Home Exercise Program (HEP)  5. Manual Therapy  6. Range of Motion (ROM)  7. Therapeutic Exercise/Strengthening   TREATMENT PLAN:  Effective Dates: 18 TO 18. Frequency/Duration: 2 times a week for 6 weeks  GOALS: (Goals have been discussed and agreed upon with patient.)  Short-Term Functional Goals: Time Frame: 4 weeks  1. Pt will be I with HEP to allow for continued progress with symptom management.   2. Pt will demonstrate 15 glute bridges without hamstring activation to demonstrate improved neuromuscular utilization of glutes for functional activities. Discharge Goals: Time Frame: 6 weeks  1. Pt will improve VIPIN score to <10 to reflect an improvement in function. 2. Pt will improve c/o pain to 2/10 to allow for improved tolerance for work duties. 3. Pt will improve glute strength to 5/5 for improved SIJ stability during functional activities. Rehabilitation Potential For Stated Goals: Good            The information in this section was collected on 01-26-18 (except where otherwise noted). HISTORY:   History of Present Injury/Illness (Reason for Referral):  Pt is a 65 y.o. Female presenting to PT with chronic LBP. 3 weeks ago lifted something that was pretty heavy and then had some pain during intercourse and has had some pain in her left sided low back. Pain has improved since onset. In the beginning pain was a sharp catching pain and had trouble standing and walking. Is currently taking ibuprofen and meloxicam.  Has trouble finding a comfortable position but often sleeps on her side. Has been diagnosed with scoliosis in the past.  Has also been told that she has 1/4-1/2 LLI with L shorter. Has been diagnosed with osteopenia  Past Medical History/Comorbidities:   Ms. Adi Patricio  has a past medical history of Anxiety state, unspecified; Carpal tunnel syndrome; Essential hypertension, benign; Insomnia, unspecified; Pain in joint, multiple sites; Plantar fascial fibromatosis; Postmenopausal atrophic vaginitis; and Unspecified hypothyroidism. Ms. Adi Patricio  has a past surgical history that includes hx carpal tunnel release (Bilateral, 8/2010) and hx cataract removal (8/06).   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 Max Daily Amount: 30 mg., Disp: 90 Cap, Rfl: 1    conjugated estrogens (PREMARIN) 0.625 mg/gram vaginal cream, Insert 1.5 g into vagina daily. , Disp: 45 g, Rfl: 11    losartan (COZAAR) 100 mg tablet, TAKE 1 TABLET BY MOUTH EVERY DAY, Disp: 90 Tab, Rfl: 3   Date Last Reviewed:  2/22/2018    Number of Personal Factors/Comorbidities that affect the Plan of Care: 1-2: MODERATE COMPLEXITY   EXAMINATION:   Observation/Orthostatic Postural Assessment:          Excessive lumbar lordosis  Palpation:          Tenderness and hypertonicity in L lumbar and thoracic paraspinals and glutes        T4-5 improved symptoms with grade III CPA  ROM:          LUMBAR SPINE    AROM    Flexion   Extension  Right Rotation  Left Rotation  Right Sidebend  Left Sidebend 90 %  80 % pain on L  80 % pain on L  80 %  65 %  65 % pain on L     Strength:          LE- 5/5 bilaterally  Except glutes 4/5       TA- 4/5  Special Tests:          SIJ CPR-  Weakly positive        Slump-  Negative  Functional Mobility:         WNL   Body Structures Involved:  1. Muscles Body Functions Affected:  1. Sensory/Pain  2. Movement Related Activities and Participation Affected:  1. General Tasks and Demands  2. Mobility   Number of elements (examined above) that affect the Plan of Care: 3: MODERATE COMPLEXITY   CLINICAL PRESENTATION:   Presentation: Evolving clinical presentation with changing clinical characteristics: MODERATE COMPLEXITY   CLINICAL DECISION MAKING:   Outcome Measure: Tool Used: Modified Oswestry Low Back Pain Questionnaire  Score:  Initial: 15/50  Most Recent: X/50 (Date: -- )   Interpretation of Score: Each section is scored on a 0-5 scale, 5 representing the greatest disability. The scores of each section are added together for a total score of 50.     Score 0 1-10 11-20 21-30 31-40 41-49 50   Modifier CH CI CJ CK CL CM CN ? Mobility - Walking and Moving Around:     - CURRENT STATUS: CJ - 20%-39% impaired, limited or restricted    - GOAL STATUS: CI - 1%-19% impaired, limited or restricted    - D/C STATUS:  ---------------To be determined---------------    Medical Necessity:   · Patient is expected to demonstrate progress in strength and range of motion to return to previous level of function. Reason for Services/Other Comments:  · Patient continues to require present interventions due to patient's inability to prolonged stand. Use of outcome tool(s) and clinical judgement create a POC that gives a: Questionable prediction of patient's progress: MODERATE COMPLEXITY            TREATMENT:   (In addition to Assessment/Re-Assessment sessions the following treatments were rendered)  Pre-treatment Symptoms/Complaints:  2/22/2018: Patient reports that she is feeling much better today. Pain: Initial: Pain Intensity 1: 3  Post Session:  2/10     THERAPEUTIC EXERCISE: (30 minutes):  Exercises per grid below to improve mobility and strength. Required moderate visual, verbal and manual cues to promote proper body alignment, promote proper body posture and promote proper body mechanics. Progressed resistance, range and repetitions as indicated.    Date:  02-13-18 Date:  02-15-18 Date:  02-20-18 Date:  02-22-18   Activity/Exercise       SIJ MET resisted R hip flexion and L hip ext    10 reps   bridge Figure 4 x 15 reps Figure 4 x 20 reps  20 reps   Tennis ball paraspinal and glute release       Nu-step 6 min level 4.0 6 min level 4.0 6 min level 4.0 6 min level 4.0   Standing theraband rows       Standing lat pulls Blue x 20 reps Blue x 20 reps     Active hamstring stretch       Hooklying posterior thigh/piriformis stretch       clamshell Blue x 20 reps Blue x 20 reps     March with glutes       SLR with TA 20 reps 20 reps each  20 reps   March with TA 20 reps 20 reps each  20 reps each   Isometric oblques Blue x 20 reps Blue x 20 reps each     Isometric hip flexion 10x5 sec hold      Side stepping with band    Blue 2x30 ft   Isometric hip ER on wall  10x3 sec hold each D/C    Quadruped rocking 10x10 sec hold each 10x10 sec hold     Alt. UE flexion in quad 20 reps 20 reps 20 reps    Cat/cow quadruped 20 reps 20 reps D/C    adenike pose   3x30 sec hold    March with glutes   2x30 ft    Supine rotation    20 reps each   Side lying hip ABD    20 reps each   SIJ mobilization supine    3 min                    MANUAL THERAPY: (15 minutes): Soft tissue mobilization was utilized and necessary because of the patient's painful spasm and restricted motion of soft tissue. Rib7 mobilization, side lying SIJ mobilization on L  MedBridge Portal  Treatment/Session Assessment:    · Response to Treatment:  Patient demonstrated excellent improvement of symptoms today. Continues to lack glute med strength. · Compliance with Program/Exercises: Will assess as treatment progresses. · Recommendations/Intent for next treatment session: \"Next visit will focus on advancements to more challenging activities\".   Total Treatment Duration:  PT Patient Time In/Time Out  Time In: 1630  Time Out: 120 [facility]

## 2018-03-02 ENCOUNTER — HOSPITAL ENCOUNTER (OUTPATIENT)
Dept: PHYSICAL THERAPY | Age: 63
Discharge: HOME | End: 2018-03-02
Attending: FAMILY MEDICINE
Payer: COMMERCIAL

## 2018-03-02 PROCEDURE — 97140 MANUAL THERAPY 1/> REGIONS: CPT

## 2018-03-02 PROCEDURE — 97110 THERAPEUTIC EXERCISES: CPT

## 2018-03-02 NOTE — PROGRESS NOTES
Joycelyn Moeller  : 1955  Primary: [facility]  Secondary:  [address]  at [facility]  [address] [facility] [address], [address]  [phone]   [phone]          OUTPATIENT PHYSICAL THERAPY:Daily Note 3/2/2018    ICD-10: Treatment Diagnosis: low back pain M54.5  Precautions/Allergies:   Review of patient's allergies indicates no known allergies. Fall Risk Score: 0 (? 5 = High Risk)  MD Orders: Eval and treat MEDICAL/REFERRING DIAGNOSIS:  Pain in thoracic spine [M54.6]  Other chronic pain [G89.29]  Sacrococcygeal disorders, not elsewhere classified [M53.3]   DATE OF ONSET: 18  REFERRING PHYSICIAN: Joan Narvaez MD  RETURN PHYSICIAN APPOINTMENT: TBD     INITIAL ASSESSMENT:  Ms. Alex Parks presents with tender and hypertonic L lumbar paraspinals and L glutes at iliac crest, poor glute strength and activation, decreased TA strength and excessive lumbar lordosis that limits ability to bend over and perform daily activities. Pt would benefit from skilled therapy to address these deficits for return to previous level of function. PROBLEM LIST (Impacting functional limitations):  1. Decreased Strength  2. Decreased ADL/Functional Activities  3. Increased Pain  4. Decreased Flexibility/Joint Mobility  5. Decreased Richardson with Home Exercise Program INTERVENTIONS PLANNED:  1. Cold  2. Electrical Stimulation  3. Heat  4. Home Exercise Program (HEP)  5. Manual Therapy  6. Range of Motion (ROM)  7. Therapeutic Exercise/Strengthening   TREATMENT PLAN:  Effective Dates: 18 TO 18. Frequency/Duration: 2 times a week for 6 weeks  GOALS: (Goals have been discussed and agreed upon with patient.)  Short-Term Functional Goals: Time Frame: 4 weeks  1. Pt will be I with HEP to allow for continued progress with symptom management.   2. Pt will demonstrate 15 glute bridges without hamstring activation to demonstrate improved neuromuscular utilization of glutes for functional activities. Discharge Goals: Time Frame: 6 weeks  1. Pt will improve VIPIN score to <10 to reflect an improvement in function. 2. Pt will improve c/o pain to 2/10 to allow for improved tolerance for work duties. 3. Pt will improve glute strength to 5/5 for improved SIJ stability during functional activities. Rehabilitation Potential For Stated Goals: Good            The information in this section was collected on 01-26-18 (except where otherwise noted). HISTORY:   History of Present Injury/Illness (Reason for Referral):  Pt is a 65 y.o. Female presenting to PT with chronic LBP. 3 weeks ago lifted something that was pretty heavy and then had some pain during intercourse and has had some pain in her left sided low back. Pain has improved since onset. In the beginning pain was a sharp catching pain and had trouble standing and walking. Is currently taking ibuprofen and meloxicam.  Has trouble finding a comfortable position but often sleeps on her side. Has been diagnosed with scoliosis in the past.  Has also been told that she has 1/4-1/2 LLI with L shorter. Has been diagnosed with osteopenia  Past Medical History/Comorbidities:   Ms. Liv Gonsales  has a past medical history of Anxiety state, unspecified; Carpal tunnel syndrome; Essential hypertension, benign; Insomnia, unspecified; Pain in joint, multiple sites; Plantar fascial fibromatosis; Postmenopausal atrophic vaginitis; and Unspecified hypothyroidism. Ms. Liv Gonsales  has a past surgical history that includes hx carpal tunnel release (Bilateral, 8/2010) and hx cataract removal (8/06).   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 Max Daily Amount: 30 mg., Disp: 90 Cap, Rfl: 1    conjugated estrogens (PREMARIN) 0.625 mg/gram vaginal cream, Insert 1.5 g into vagina daily. , Disp: 45 g, Rfl: 11    losartan (COZAAR) 100 mg tablet, TAKE 1 TABLET BY MOUTH EVERY DAY, Disp: 90 Tab, Rfl: 3   Date Last Reviewed:  3/2/2018    Number of Personal Factors/Comorbidities that affect the Plan of Care: 1-2: MODERATE COMPLEXITY   EXAMINATION:   Observation/Orthostatic Postural Assessment:          Excessive lumbar lordosis  Palpation:          Tenderness and hypertonicity in L lumbar and thoracic paraspinals and glutes        T4-5 improved symptoms with grade III CPA  ROM:          LUMBAR SPINE    AROM    Flexion   Extension  Right Rotation  Left Rotation  Right Sidebend  Left Sidebend 90 %  80 % pain on L  80 % pain on L  80 %  65 %  65 % pain on L     Strength:          LE- 5/5 bilaterally  Except glutes 4/5       TA- 4/5  Special Tests:          SIJ CPR-  Weakly positive        Slump-  Negative  Functional Mobility:         WNL   Body Structures Involved:  1. Muscles Body Functions Affected:  1. Sensory/Pain  2. Movement Related Activities and Participation Affected:  1. General Tasks and Demands  2. Mobility   Number of elements (examined above) that affect the Plan of Care: 3: MODERATE COMPLEXITY   CLINICAL PRESENTATION:   Presentation: Evolving clinical presentation with changing clinical characteristics: MODERATE COMPLEXITY   CLINICAL DECISION MAKING:   Outcome Measure: Tool Used: Modified Oswestry Low Back Pain Questionnaire  Score:  Initial: 15/50  Most Recent: X/50 (Date: -- )   Interpretation of Score: Each section is scored on a 0-5 scale, 5 representing the greatest disability. The scores of each section are added together for a total score of 50. Score 0 1-10 11-20 21-30 31-40 41-49 50   Modifier CH CI CJ CK CL CM CN     ?  Mobility - Walking and Moving Around:     - CURRENT STATUS: CJ - 20%-39% impaired, limited or restricted    - GOAL STATUS: CI - 1%-19% impaired, limited or restricted    - D/C STATUS:  ---------------To be determined---------------    Medical Necessity:   · Patient is expected to demonstrate progress in strength and range of motion to return to previous level of function. Reason for Services/Other Comments:  · Patient continues to require present interventions due to patient's inability to prolonged stand. Use of outcome tool(s) and clinical judgement create a POC that gives a: Questionable prediction of patient's progress: MODERATE COMPLEXITY            TREATMENT:   (In addition to Assessment/Re-Assessment sessions the following treatments were rendered)  Pre-treatment Symptoms/Complaints:  3/2/2018: Patient reports that she is feeling much better today. Pain: Initial: Pain Intensity 1: 3  Post Session:  2/10     THERAPEUTIC EXERCISE: (30 minutes):  Exercises per grid below to improve mobility and strength. Required moderate visual, verbal and manual cues to promote proper body alignment, promote proper body posture and promote proper body mechanics. Progressed resistance, range and repetitions as indicated.    Date:  02-15-18 Date:  02-20-18 Date:  02-22-18 Date:  03-02-18   Activity/Exercise       SIJ MET resisted R hip flexion and L hip ext   10 reps    bridge Figure 4 x 20 reps  20 reps Single leg x 20 reps each   Tennis ball paraspinal and glute release       Nu-step 6 min level 4.0 6 min level 4.0 6 min level 4.0 6 min level 4.0   Standing theraband rows       Standing lat pulls Blue x 20 reps      Active hamstring stretch       Hooklying posterior thigh/piriformis stretch       clamshell Blue x 20 reps      March with glutes       SLR with TA 20 reps each  20 reps 20 reps   March with TA 20 reps each  20 reps each 20 reps each   Isometric oblques Blue x 20 reps each      Isometric hip flexion Side stepping with band   Blue 2x30 ft Blue 2x30 ft   Isometric hip ER on wall 10x3 sec hold each D/C     Quadruped rocking 10x10 sec hold   10 x 10 sec hold   Alt. UE flexion in quad 20 reps 20 reps     Cat/cow quadruped 20 reps D/C     adenike pose  3x30 sec hold     March with glutes  2x30 ft     Side lying hip ABD   20 reps each 20 reps each   SIJ mobilization supine   3 min 3 min                    MANUAL THERAPY: (15 minutes): Soft tissue mobilization was utilized and necessary because of the patient's painful spasm and restricted motion of soft tissue. Rib7 mobilization, side lying SIJ mobilization on L  Med[employer] Portal  Treatment/Session Assessment:    · Response to Treatment:  Improved subjective symptoms by end of session. Added rows to HEP. · Compliance with Program/Exercises: Will assess as treatment progresses. · Recommendations/Intent for next treatment session: \"Next visit will focus on advancements to more challenging activities\".   Total Treatment Duration:  PT Patient Time In/Time Out  Time In: 1613  Time Out: [address]

## 2018-03-06 ENCOUNTER — HOSPITAL ENCOUNTER (OUTPATIENT)
Dept: PHYSICAL THERAPY | Age: 63
Discharge: HOME | End: 2018-03-06
Attending: FAMILY MEDICINE
Payer: COMMERCIAL

## 2018-03-06 PROCEDURE — 97140 MANUAL THERAPY 1/> REGIONS: CPT

## 2018-03-06 PROCEDURE — 97110 THERAPEUTIC EXERCISES: CPT

## 2018-03-06 NOTE — PROGRESS NOTES
Randy Zheng  : 1955  Primary: [facility]  Secondary:  [address] at [facility]  [address] [facility], [address] [place] [address],[address], [address] [facility]  [phone]   [phone]          OUTPATIENT PHYSICAL THERAPY:Daily Note 3/6/2018    ICD-10: Treatment Diagnosis: low back pain M54.5  Precautions/Allergies:   Review of patient's allergies indicates no known allergies. Fall Risk Score: 0 (? 5 = High Risk)  MD Orders: Eval and treat MEDICAL/REFERRING DIAGNOSIS:  Pain in thoracic spine [M54.6]  Other chronic pain [G89.29]  Sacrococcygeal disorders, not elsewhere classified [M53.3]   DATE OF ONSET: 18  REFERRING PHYSICIAN: Clay Joy MD  RETURN PHYSICIAN APPOINTMENT: TBD     INITIAL ASSESSMENT:  Ms. Tyrell Parham presents with tender and hypertonic L lumbar paraspinals and L glutes at iliac crest, poor glute strength and activation, decreased TA strength and excessive lumbar lordosis that limits ability to bend over and perform daily activities. Pt would benefit from skilled therapy to address these deficits for return to previous level of function. PROBLEM LIST (Impacting functional limitations):  1. Decreased Strength  2. Decreased ADL/Functional Activities  3. Increased Pain  4. Decreased Flexibility/Joint Mobility  5. Decreased [place] with Home Exercise Program INTERVENTIONS PLANNED:  1. Cold  2. Electrical Stimulation  3. Heat  4. Home Exercise Program (HEP)  5. Manual Therapy  6. Range of Motion (ROM)  7. Therapeutic Exercise/Strengthening   TREATMENT PLAN:  Effective Dates: 18 TO 18. Frequency/Duration: 2 times a week for 6 weeks  GOALS: (Goals have been discussed and agreed upon with patient.)  Short-Term Functional Goals: Time Frame: 4 weeks  1. Pt will be I with HEP to allow for continued progress with symptom management.   2. Pt will demonstrate 15 glute bridges without hamstring activation to demonstrate improved neuromuscular utilization of glutes for functional activities. Discharge Goals: Time Frame: 6 weeks  1. Pt will improve VIPIN score to <10 to reflect an improvement in function. 2. Pt will improve c/o pain to 2/10 to allow for improved tolerance for work duties. 3. Pt will improve glute strength to 5/5 for improved SIJ stability during functional activities. Rehabilitation Potential For Stated Goals: Good            The information in this section was collected on 01-26-18 (except where otherwise noted). HISTORY:   History of Present Injury/Illness (Reason for Referral):  Pt is a 65 y.o. Female presenting to PT with chronic LBP. 3 weeks ago lifted something that was pretty heavy and then had some pain during intercourse and has had some pain in her left sided low back. Pain has improved since onset. In the beginning pain was a sharp catching pain and had trouble standing and walking. Is currently taking ibuprofen and meloxicam.  Has trouble finding a comfortable position but often sleeps on her side. Has been diagnosed with scoliosis in the past.  Has also been told that she has 1/4-1/2 LLI with L shorter. Has been diagnosed with osteopenia  Past Medical History/Comorbidities:   Ms. Marlo Davis  has a past medical history of Anxiety state, unspecified; Carpal tunnel syndrome; Essential hypertension, benign; Insomnia, unspecified; Pain in joint, multiple sites; Plantar fascial fibromatosis; Postmenopausal atrophic vaginitis; and Unspecified hypothyroidism. Ms. Marlo Davis  has a past surgical history that includes hx carpal tunnel release (Bilateral, 8/2010) and hx cataract removal (8/06).   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 Max Daily Amount: 30 mg., Disp: 90 Cap, Rfl: 1    conjugated estrogens (PREMARIN) 0.625 mg/gram vaginal cream, Insert 1.5 g into vagina daily. , Disp: 45 g, Rfl: 11    losartan (COZAAR) 100 mg tablet, TAKE 1 TABLET BY MOUTH EVERY DAY, Disp: 90 Tab, Rfl: 3   Date Last Reviewed:  3/6/2018    Number of Personal Factors/Comorbidities that affect the Plan of Care: 1-2: MODERATE COMPLEXITY   EXAMINATION:   Observation/Orthostatic Postural Assessment:          Excessive lumbar lordosis  Palpation:          Tenderness and hypertonicity in L lumbar and thoracic paraspinals and glutes        T4-5 improved symptoms with grade III CPA  ROM:          LUMBAR SPINE    AROM    Flexion   Extension  Right Rotation  Left Rotation  Right Sidebend  Left Sidebend 90 %  80 % pain on L  80 % pain on L  80 %  65 %  65 % pain on L     Strength:          LE- 5/5 bilaterally  Except glutes 4/5       TA- 4/5  Special Tests:          SIJ CPR-  Weakly positive        Slump-  Negative  Functional Mobility:         WNL   Body Structures Involved:  1. Muscles Body Functions Affected:  1. Sensory/Pain  2. Movement Related Activities and Participation Affected:  1. General Tasks and Demands  2. Mobility   Number of elements (examined above) that affect the Plan of Care: 3: MODERATE COMPLEXITY   CLINICAL PRESENTATION:   Presentation: Evolving clinical presentation with changing clinical characteristics: MODERATE COMPLEXITY   CLINICAL DECISION MAKING:   Outcome Measure: Tool Used: Modified Oswestry Low Back Pain Questionnaire  Score:  Initial: 15/50  Most Recent: X/50 (Date: -- )   Interpretation of Score: Each section is scored on a 0-5 scale, 5 representing the greatest disability. The scores of each section are added together for a total score of 50. Score 0 1-10 11-20 21-30 31-40 41-49 50   Modifier CH CI CJ CK CL CM CN     ?  Mobility - Walking and Moving Around:     - CURRENT STATUS: CJ - 20%-39% impaired, limited or restricted    - GOAL STATUS: CI - 1%-19% impaired, limited or restricted    - D/C STATUS:  ---------------To be determined---------------    Medical Necessity:   · Patient is expected to demonstrate progress in strength and range of motion to return to previous level of function. Reason for Services/Other Comments:  · Patient continues to require present interventions due to patient's inability to prolonged stand. Use of outcome tool(s) and clinical judgement create a POC that gives a: Questionable prediction of patient's progress: MODERATE COMPLEXITY            TREATMENT:   (In addition to Assessment/Re-Assessment sessions the following treatments were rendered)  Pre-treatment Symptoms/Complaints:  3/6/2018: Patient reports that her pain is slowly getting better. Pain: Initial: Pain Intensity 1: 3  Post Session:  2/10     THERAPEUTIC EXERCISE: (30 minutes):  Exercises per grid below to improve mobility and strength. Required moderate visual, verbal and manual cues to promote proper body alignment, promote proper body posture and promote proper body mechanics. Progressed resistance, range and repetitions as indicated.    Date:  02-20-18 Date:  02-22-18 Date:  03-02-18 Date:  03-06-18   Activity/Exercise       SIJ MET resisted R hip flexion and L hip ext  10 reps     bridge  20 reps Single leg x 20 reps each Figure 4 x 20 reps, regular 20 reps   Tennis ball paraspinal and glute release       Nu-step 6 min level 4.0 6 min level 4.0 6 min level 4.0 6 min level 4.0   Standing theraband rows       Standing lat pulls       Active hamstring stretch       Hooklying posterior thigh/piriformis stretch       clamshell    B LE 20 reps blue   March with glutes       SLR with TA  20 reps 20 reps    March with TA  20 reps each 20 reps each 20 reps each   Isometric oblques    20 reps each   Isometric hip flexion    10x5 sec hold each   Side stepping with band  Blue 2x30 ft Blue 2x30 ft    Isometric hip ER on wall D/C      Quadruped rocking   10 x 10 sec hold    Alt. UE flexion in quad 20 reps      Cat/cow quadruped D/C      adenike pose 3x30 sec hold      March with glutes 2x30 ft      Side lying hip ABD  20 reps each 20 reps each 20 reps each   SIJ mobilization supine  3 min 3 min    Lumbar shift correction on wall    20 reps             MANUAL THERAPY: (15 minutes): Soft tissue mobilization was utilized and necessary because of the patient's painful spasm and restricted motion of soft tissue. Rib7 mobilization, side lying SIJ mobilization on L  Med[employer] Portal  Treatment/Session Assessment:    · Response to Treatment:  Pt is improving in symptom management. Updated HEP today. · Compliance with Program/Exercises: Will assess as treatment progresses. · Recommendations/Intent for next treatment session: \"Next visit will focus on advancements to more challenging activities\".   Total Treatment Duration:  PT Patient Time In/Time Out  Time In: 1625  Time Out: [address]

## 2018-03-08 ENCOUNTER — HOSPITAL ENCOUNTER (OUTPATIENT)
Dept: PHYSICAL THERAPY | Age: 63
Discharge: HOME | End: 2018-03-08
Attending: FAMILY MEDICINE
Payer: COMMERCIAL

## 2018-03-08 PROCEDURE — 97140 MANUAL THERAPY 1/> REGIONS: CPT

## 2018-03-08 PROCEDURE — 97110 THERAPEUTIC EXERCISES: CPT

## 2018-03-08 NOTE — PROGRESS NOTES
Sky Balbuena  : 1955  Primary: Tatianna Rico  Secondary:  [address] at [place] [id number] [facility], [address] [place] [address],[address], [facility] [address]  [phone]   [phone]          OUTPATIENT PHYSICAL THERAPY:Daily Note 3/8/2018    ICD-10: Treatment Diagnosis: low back pain M54.5  Precautions/Allergies:   Review of patient's allergies indicates no known allergies. Fall Risk Score: 0 (? 5 = High Risk)  MD Orders: Eval and treat MEDICAL/REFERRING DIAGNOSIS:  Pain in thoracic spine [M54.6]  Other chronic pain [G89.29]  Sacrococcygeal disorders, not elsewhere classified [M53.3]   DATE OF ONSET: 18  REFERRING PHYSICIAN: Meghan Stiles MD  RETURN PHYSICIAN APPOINTMENT: TBD     INITIAL ASSESSMENT:  Ms. Quynh Hicks presents with tender and hypertonic L lumbar paraspinals and L glutes at iliac crest, poor glute strength and activation, decreased TA strength and excessive lumbar lordosis that limits ability to bend over and perform daily activities. Pt would benefit from skilled therapy to address these deficits for return to previous level of function. PROBLEM LIST (Impacting functional limitations):  1. Decreased Strength  2. Decreased ADL/Functional Activities  3. Increased Pain  4. Decreased Flexibility/Joint Mobility  5. Decreased [place] with Home Exercise Program INTERVENTIONS PLANNED:  1. Cold  2. Electrical Stimulation  3. Heat  4. Home Exercise Program (HEP)  5. Manual Therapy  6. Range of Motion (ROM)  7. Therapeutic Exercise/Strengthening   TREATMENT PLAN:  Effective Dates: 18 TO 18. Frequency/Duration: 2 times a week for 6 weeks  GOALS: (Goals have been discussed and agreed upon with patient.)  Short-Term Functional Goals: Time Frame: 4 weeks  1. Pt will be I with HEP to allow for continued progress with symptom management.   2. Pt will demonstrate 15 glute bridges without hamstring activation to demonstrate improved neuromuscular utilization of glutes for functional activities. Discharge Goals: Time Frame: 6 weeks  1. Pt will improve VIPIN score to <10 to reflect an improvement in function. 2. Pt will improve c/o pain to 2/10 to allow for improved tolerance for work duties. 3. Pt will improve glute strength to 5/5 for improved SIJ stability during functional activities. Rehabilitation Potential For Stated Goals: Good            The information in this section was collected on 01-26-18 (except where otherwise noted). HISTORY:   History of Present Injury/Illness (Reason for Referral):  Pt is a 65 y.o. Female presenting to PT with chronic LBP. 3 weeks ago lifted something that was pretty heavy and then had some pain during intercourse and has had some pain in her left sided low back. Pain has improved since onset. In the beginning pain was a sharp catching pain and had trouble standing and walking. Is currently taking ibuprofen and meloxicam.  Has trouble finding a comfortable position but often sleeps on her side. Has been diagnosed with scoliosis in the past.  Has also been told that she has 1/4-1/2 LLI with L shorter. Has been diagnosed with osteopenia  Past Medical History/Comorbidities:   Ms. Grover Charles  has a past medical history of Anxiety state, unspecified; Carpal tunnel syndrome; Essential hypertension, benign; Insomnia, unspecified; Pain in joint, multiple sites; Plantar fascial fibromatosis; Postmenopausal atrophic vaginitis; and Unspecified hypothyroidism. Ms. Grover Charles  has a past surgical history that includes hx carpal tunnel release (Bilateral, 8/2010) and hx cataract removal (8/06).   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 Max Daily Amount: 30 mg., Disp: 90 Cap, Rfl: 1    conjugated estrogens (PREMARIN) 0.625 mg/gram vaginal cream, Insert 1.5 g into vagina daily. , Disp: 45 g, Rfl: 11    losartan (COZAAR) 100 mg tablet, TAKE 1 TABLET BY MOUTH EVERY DAY, Disp: 90 Tab, Rfl: 3   Date Last Reviewed:  3/8/2018    Number of Personal Factors/Comorbidities that affect the Plan of Care: 1-2: MODERATE COMPLEXITY   EXAMINATION:   Observation/Orthostatic Postural Assessment:          Excessive lumbar lordosis  Palpation:          Tenderness and hypertonicity in L lumbar and thoracic paraspinals and glutes        T4-5 improved symptoms with grade III CPA  ROM:          LUMBAR SPINE    AROM    Flexion   Extension  Right Rotation  Left Rotation  Right Sidebend  Left Sidebend 90 %  80 % pain on L  80 % pain on L  80 %  65 %  65 % pain on L     Strength:          LE- 5/5 bilaterally  Except glutes 4/5       TA- 4/5  Special Tests:          SIJ CPR-  Weakly positive        Slump-  Negative  Functional Mobility:         WNL   Body Structures Involved:  1. Muscles Body Functions Affected:  1. Sensory/Pain  2. Movement Related Activities and Participation Affected:  1. General Tasks and Demands  2. Mobility   Number of elements (examined above) that affect the Plan of Care: 3: MODERATE COMPLEXITY   CLINICAL PRESENTATION:   Presentation: Evolving clinical presentation with changing clinical characteristics: MODERATE COMPLEXITY   CLINICAL DECISION MAKING:   Outcome Measure: Tool Used: Modified Oswestry Low Back Pain Questionnaire  Score:  Initial: 15/50  Most Recent: X/50 (Date: -- )   Interpretation of Score: Each section is scored on a 0-5 scale, 5 representing the greatest disability. The scores of each section are added together for a total score of 50. Score 0 1-10 11-20 21-30 31-40 41-49 50   Modifier CH CI CJ CK CL CM CN     ?  Mobility - Walking and Moving Around:     - CURRENT STATUS: CJ - 20%-39% impaired, limited or restricted    - GOAL STATUS: CI - 1%-19% impaired, limited or restricted    - D/C STATUS:  ---------------To be determined---------------    Medical Necessity:   · Patient is expected to demonstrate progress in strength and range of motion to return to previous level of function. Reason for Services/Other Comments:  · Patient continues to require present interventions due to patient's inability to prolonged stand. Use of outcome tool(s) and clinical judgement create a POC that gives a: Questionable prediction of patient's progress: MODERATE COMPLEXITY            TREATMENT:   (In addition to Assessment/Re-Assessment sessions the following treatments were rendered)  Pre-treatment Symptoms/Complaints:  3/8/2018: Patient reports that her pain has improved a lot and she is doing better. Pain: Initial: Pain Intensity 1: 2  Post Session:  2/10     THERAPEUTIC EXERCISE: (30 minutes):  Exercises per grid below to improve mobility and strength. Required moderate visual, verbal and manual cues to promote proper body alignment, promote proper body posture and promote proper body mechanics. Progressed resistance, range and repetitions as indicated.    Date:  02-22-18 Date:  03-02-18 Date:  03-06-18 Date:  03-08-18   Activity/Exercise       SIJ MET resisted R hip flexion and L hip ext 10 reps      bridge 20 reps Single leg x 20 reps each Figure 4 x 20 reps, regular 20 reps Figure 4 x 20 reps, regular x 20 reps   Tennis ball paraspinal and glute release       Nu-step 6 min level 4.0 6 min level 4.0 6 min level 4.0 6 min level 4.0   Standing theraband rows       Standing lat pulls       Active hamstring stretch       Hooklying posterior thigh/piriformis stretch       clamshell   B LE 20 reps blue Black x 20 reps   March with glutes       SLR with TA 20 reps 20 reps  20 reps each   March with TA 20 reps each 20 reps each 20 reps each 20 reps   Isometric oblques   20 reps each 20 reps each   Isometric hip flexion   10x5 sec hold each 10x5 sec hold   Side stepping with band Blue 2x30 ft Blue 2x30 ft     Isometric hip ER on wall       Quadruped rocking  10 x 10 sec hold     Alt. UE flexion in quad       Cat/cow quadruped       adenike pose       March with glutes       Side lying hip ABD 20 reps each 20 reps each 20 reps each 20 reps each   SIJ mobilization supine 3 min 3 min     Lumbar shift correction on wall   20 reps 20 reps   Horizontal ABD    greenx  20 reps      MANUAL THERAPY: (15 minutes): Soft tissue mobilization was utilized and necessary because of the patient's painful spasm and restricted motion of soft tissue. Rib7 mobilization, side lying SIJ mobilization on L  MedBridge Portal  Treatment/Session Assessment:    · Response to Treatment:  Pt is progressing well with symptom management. Cont. Rib mobility deficits. .  · Compliance with Program/Exercises: Will assess as treatment progresses. · Recommendations/Intent for next treatment session: \"Next visit will focus on advancements to more challenging activities\".   Total Treatment Duration:  PT Patient Time In/Time Out  Time In: 1630  Time Out: [address]

## 2018-03-13 ENCOUNTER — HOSPITAL ENCOUNTER (OUTPATIENT)
Dept: PHYSICAL THERAPY | Age: 63
Discharge: HOME | End: 2018-03-13
Attending: FAMILY MEDICINE
Payer: COMMERCIAL

## 2018-03-13 PROCEDURE — 97110 THERAPEUTIC EXERCISES: CPT

## 2018-03-13 PROCEDURE — 97140 MANUAL THERAPY 1/> REGIONS: CPT

## 2018-03-13 NOTE — PROGRESS NOTES
Kary Castilloo  : 1955  Primary: Gabriela Rico  Secondary:  [address] at [facility]  [address] [facility], [address] [place] [address],[address], [facility] [facility]  [phone]   [phone]          OUTPATIENT PHYSICAL THERAPY:Daily Note 3/13/2018    ICD-10: Treatment Diagnosis: low back pain M54.5  Precautions/Allergies:   Review of patient's allergies indicates no known allergies. Fall Risk Score: 0 (? 5 = High Risk)  MD Orders: Eval and treat MEDICAL/REFERRING DIAGNOSIS:  Pain in thoracic spine [M54.6]  Other chronic pain [G89.29]  Sacrococcygeal disorders, not elsewhere classified [M53.3]   DATE OF ONSET: 18  REFERRING PHYSICIAN: Deep Medeiros MD  RETURN PHYSICIAN APPOINTMENT: TBD     INITIAL ASSESSMENT:  Ms. Madelaine Lanes presents with tender and hypertonic L lumbar paraspinals and L glutes at iliac crest, poor glute strength and activation, decreased TA strength and excessive lumbar lordosis that limits ability to bend over and perform daily activities. Pt would benefit from skilled therapy to address these deficits for return to previous level of function. PROBLEM LIST (Impacting functional limitations):  1. Decreased Strength  2. Decreased ADL/Functional Activities  3. Increased Pain  4. Decreased Flexibility/Joint Mobility  5. Decreased [place] with Home Exercise Program INTERVENTIONS PLANNED:  1. Cold  2. Electrical Stimulation  3. Heat  4. Home Exercise Program (HEP)  5. Manual Therapy  6. Range of Motion (ROM)  7. Therapeutic Exercise/Strengthening   TREATMENT PLAN:  Effective Dates: 18 TO 18. Frequency/Duration: 2 times a week for 6 weeks  GOALS: (Goals have been discussed and agreed upon with patient.)  Short-Term Functional Goals: Time Frame: 4 weeks  1. Pt will be I with HEP to allow for continued progress with symptom management.   2. Pt will demonstrate 15 glute bridges without hamstring activation to demonstrate improved neuromuscular utilization of glutes for functional activities. Discharge Goals: Time Frame: 6 weeks  1. Pt will improve VIPIN score to <10 to reflect an improvement in function. 2. Pt will improve c/o pain to 2/10 to allow for improved tolerance for work duties. 3. Pt will improve glute strength to 5/5 for improved SIJ stability during functional activities. Rehabilitation Potential For Stated Goals: Good            The information in this section was collected on 01-26-18 (except where otherwise noted). HISTORY:   History of Present Injury/Illness (Reason for Referral):  Pt is a 65 y.o. Female presenting to PT with chronic LBP. 3 weeks ago lifted something that was pretty heavy and then had some pain during intercourse and has had some pain in her left sided low back. Pain has improved since onset. In the beginning pain was a sharp catching pain and had trouble standing and walking. Is currently taking ibuprofen and meloxicam.  Has trouble finding a comfortable position but often sleeps on her side. Has been diagnosed with scoliosis in the past.  Has also been told that she has 1/4-1/2 LLI with L shorter. Has been diagnosed with osteopenia  Past Medical History/Comorbidities:   Ms. Darian Dubon  has a past medical history of Anxiety state, unspecified; Carpal tunnel syndrome; Essential hypertension, benign; Insomnia, unspecified; Pain in joint, multiple sites; Plantar fascial fibromatosis; Postmenopausal atrophic vaginitis; and Unspecified hypothyroidism. Ms. Darian Dubon  has a past surgical history that includes hx carpal tunnel release (Bilateral, 8/2010) and hx cataract removal (8/06).   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 Max Daily Amount: 30 mg., Disp: 90 Cap, Rfl: 1    conjugated estrogens (PREMARIN) 0.625 mg/gram vaginal cream, Insert 1.5 g into vagina daily. , Disp: 45 g, Rfl: 11    losartan (COZAAR) 100 mg tablet, TAKE 1 TABLET BY MOUTH EVERY DAY, Disp: 90 Tab, Rfl: 3   Date Last Reviewed:  3/13/2018    Number of Personal Factors/Comorbidities that affect the Plan of Care: 1-2: MODERATE COMPLEXITY   EXAMINATION:   Observation/Orthostatic Postural Assessment:          Excessive lumbar lordosis  Palpation:          Tenderness and hypertonicity in L lumbar and thoracic paraspinals and glutes        T4-5 improved symptoms with grade III CPA  ROM:          LUMBAR SPINE    AROM    Flexion   Extension  Right Rotation  Left Rotation  Right Sidebend  Left Sidebend 90 %  80 % pain on L  80 % pain on L  80 %  65 %  65 % pain on L     Strength:          LE- 5/5 bilaterally  Except glutes 4/5       TA- 4/5  Special Tests:          SIJ CPR-  Weakly positive        Slump-  Negative  Functional Mobility:         WNL   Body Structures Involved:  1. Muscles Body Functions Affected:  1. Sensory/Pain  2. Movement Related Activities and Participation Affected:  1. General Tasks and Demands  2. Mobility   Number of elements (examined above) that affect the Plan of Care: 3: MODERATE COMPLEXITY   CLINICAL PRESENTATION:   Presentation: Evolving clinical presentation with changing clinical characteristics: MODERATE COMPLEXITY   CLINICAL DECISION MAKING:   Outcome Measure: Tool Used: Modified Oswestry Low Back Pain Questionnaire  Score:  Initial: 15/50  Most Recent: X/50 (Date: -- )   Interpretation of Score: Each section is scored on a 0-5 scale, 5 representing the greatest disability. The scores of each section are added together for a total score of 50.     Score 0 1-10 11-20 21-30 31-40 41-49 50   Modifier CH CI CJ CK CL CM CN ? Mobility - Walking and Moving Around:     - CURRENT STATUS: CJ - 20%-39% impaired, limited or restricted    - GOAL STATUS: CI - 1%-19% impaired, limited or restricted    - D/C STATUS:  ---------------To be determined---------------    Medical Necessity:   · Patient is expected to demonstrate progress in strength and range of motion to return to previous level of function. Reason for Services/Other Comments:  · Patient continues to require present interventions due to patient's inability to prolonged stand. Use of outcome tool(s) and clinical judgement create a POC that gives a: Questionable prediction of patient's progress: MODERATE COMPLEXITY            TREATMENT:   (In addition to Assessment/Re-Assessment sessions the following treatments were rendered)  Pre-treatment Symptoms/Complaints:  3/13/2018: Patient reports that she was very sore after last visit but felt good after a day. Pain: Initial: Pain Intensity 1: 2  Post Session:  2/10     THERAPEUTIC EXERCISE: (30 minutes):  Exercises per grid below to improve mobility and strength. Required moderate visual, verbal and manual cues to promote proper body alignment, promote proper body posture and promote proper body mechanics. Progressed resistance, range and repetitions as indicated.    Date:  03-02-18 Date:  03-06-18 Date:  03-08-18 Date:  03-13-18   Activity/Exercise       SIJ MET resisted R hip flexion and L hip ext       bridge Single leg x 20 reps each Figure 4 x 20 reps, regular 20 reps Figure 4 x 20 reps, regular x 20 reps Figure 4 x 20 reps each   Tennis ball paraspinal and glute release       Nu-step 6 min level 4.0 6 min level 4.0 6 min level 4.0 6 min level 4.0   Standing theraband rows       Standing lat pulls       Active hamstring stretch       Hooklying posterior thigh/piriformis stretch       clamshell  B LE 20 reps blue Black x 20 reps Blue x 20 reps   March with glutes       SLR with TA 20 reps  20 reps each 20 reps each   March with TA 20 reps each 20 reps each 20 reps 20 reps   Isometric oblques  20 reps each 20 reps each Blue x 20 reps each   Isometric hip flexion  10x5 sec hold each 10x5 sec hold 10x5 sec hold each   Side stepping with band Blue 2x30 ft   2x30 ft blue   Isometric hip ER on wall       Quadruped rocking 10 x 10 sec hold      Alt. UE flexion in quad       Cat/cow quadruped       adenike pose       March with glutes    2x30 ft   Side lying hip ABD 20 reps each 20 reps each 20 reps each 20 reps each   SIJ mobilization supine 3 min      Lumbar shift correction on wall  20 reps 20 reps    Horizontal ABD   greenx  20 reps    Monster walk    2x30 ft                    MANUAL THERAPY: (15 minutes): Soft tissue mobilization was utilized and necessary because of the patient's painful spasm and restricted motion of soft tissue. Rib7 mobilization, side lying SIJ mobilization on L, piriformis and glute release  MedBridge Portal  Treatment/Session Assessment:    · Response to Treatment:  Pt fitted with SIJ belt today with good subjective relief of symptoms with standing and sitting. Advised to only use at the end of the day. · Compliance with Program/Exercises: Will assess as treatment progresses. · Recommendations/Intent for next treatment session: \"Next visit will focus on advancements to more challenging activities\".   Total Treatment Duration:  PT Patient Time In/Time Out  Time In: 1635  Time Out: 120 [facility]

## 2018-03-22 ENCOUNTER — HOSPITAL ENCOUNTER (OUTPATIENT)
Dept: PHYSICAL THERAPY | Age: 63
Discharge: HOME | End: 2018-03-22
Attending: FAMILY MEDICINE
Payer: COMMERCIAL

## 2018-03-22 PROCEDURE — 97110 THERAPEUTIC EXERCISES: CPT

## 2018-03-22 PROCEDURE — 97140 MANUAL THERAPY 1/> REGIONS: CPT

## 2018-03-22 NOTE — PROGRESS NOTES
Aidee Sargent  : 1955  Primary: [facility]  Secondary:  [address]  at [address], [address] [address], [address] [facility]  [phone]   [phone]          OUTPATIENT PHYSICAL THERAPY:Daily Note and Re-evaluation 3/22/2018    ICD-10: Treatment Diagnosis: low back pain M54.5  Precautions/Allergies:   Review of patient's allergies indicates no known allergies. Fall Risk Score: 0 (? 5 = High Risk)  MD Orders: Eval and treat MEDICAL/REFERRING DIAGNOSIS:  Pain in thoracic spine [M54.6]  Other chronic pain [G89.29]  Sacrococcygeal disorders, not elsewhere classified [M53.3]   DATE OF ONSET: 18  REFERRING PHYSICIAN: Ralph Lopez MD  RETURN PHYSICIAN APPOINTMENT: TBD     INITIAL ASSESSMENT:  Ms. Gemma Krishnamurthy has attended 13 visits since 18 with good improvement with glute and TA strength and has improved in symptom management. Continues to present with tender and hypertonic L lumbar paraspinals and L glutes at iliac crest, fair glute strength and activation, decreased TA strength and excessive lumbar lordosis that limits ability to bend over and perform daily activities. Pt would benefit from skilled therapy to address these deficits for return to previous level of function. PROBLEM LIST (Impacting functional limitations):  1. Decreased Strength  2. Decreased ADL/Functional Activities  3. Increased Pain  4. Decreased Flexibility/Joint Mobility  5. Decreased [place] with Home Exercise Program INTERVENTIONS PLANNED:  1. Cold  2. Electrical Stimulation  3. Heat  4. Home Exercise Program (HEP)  5. Manual Therapy  6. Range of Motion (ROM)  7. Therapeutic Exercise/Strengthening   TREATMENT PLAN:  Effective Dates: 18 TO 18. Frequency/Duration: 2 times a week for 6 weeks  GOALS: (Goals have been discussed and agreed upon with patient.)  Short-Term Functional Goals: Time Frame: 4 weeks  1.  Pt will be I with HEP to allow for continued progress with symptom management. MET   2. Pt will demonstrate 15 glute bridges without hamstring activation to demonstrate improved neuromuscular utilization of glutes for functional activities. MET 03-22-18  Discharge Goals: Time Frame: 6 weeks  1. Pt will improve VIPIN score to <10 to reflect an improvement in function. Ongoing  2. Pt will improve c/o pain to 2/10 to allow for improved tolerance for work duties. Ongoing  3. Pt will improve glute strength to 5/5 for improved SIJ stability during functional activities. Ongoing  Rehabilitation Potential For Stated Goals: Good            The information in this section was collected on 01-26-18 (except where otherwise noted). HISTORY:   History of Present Injury/Illness (Reason for Referral):  Pt is a 65 y.o. Female presenting to PT with chronic LBP. 3 weeks ago lifted something that was pretty heavy and then had some pain during intercourse and has had some pain in her left sided low back. Pain has improved since onset. In the beginning pain was a sharp catching pain and had trouble standing and walking. Is currently taking ibuprofen and meloxicam.  Has trouble finding a comfortable position but often sleeps on her side. Has been diagnosed with scoliosis in the past.  Has also been told that she has 1/4-1/2 LLI with L shorter. Has been diagnosed with osteopenia  Past Medical History/Comorbidities:   Ms. Ho Blanco  has a past medical history of Anxiety state, unspecified; Carpal tunnel syndrome; Essential hypertension, benign; Insomnia, unspecified; Pain in joint, multiple sites; Plantar fascial fibromatosis; Postmenopausal atrophic vaginitis; and Unspecified hypothyroidism. Ms. Ho Blanco  has a past surgical history that includes hx carpal tunnel release (Bilateral, 8/2010) and hx cataract removal (8/06).   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 Max Daily Amount: 30 mg., Disp: 90 Cap, Rfl: 1    conjugated estrogens (PREMARIN) 0.625 mg/gram vaginal cream, Insert 1.5 g into vagina daily. , Disp: 45 g, Rfl: 11   Date Last Reviewed:  3/22/2018    Number of Personal Factors/Comorbidities that affect the Plan of Care: 1-2: MODERATE COMPLEXITY   EXAMINATION:   Observation/Orthostatic Postural Assessment:          Excessive lumbar lordosis  Palpation:          Tenderness and hypertonicity in L lumbar and thoracic paraspinals and glutes        T4-5 improved symptoms with grade III CPA  ROM:          LUMBAR SPINE    AROM    Flexion   Extension  Right Rotation  Left Rotation  Right Sidebend  Left Sidebend 90 %  80 %   80 %   80 %  65 %  65 %      Strength:          LE- 5/5 bilaterally  Except glutes 4+/5       TA- 4+/5  Special Tests:          SIJ CPR-  Weakly positive        Slump-  Negative  Functional Mobility:         WNL   Body Structures Involved:  1. Muscles Body Functions Affected:  1. Sensory/Pain  2. Movement Related Activities and Participation Affected:  1. General Tasks and Demands  2. Mobility   Number of elements (examined above) that affect the Plan of Care: 3: MODERATE COMPLEXITY   CLINICAL PRESENTATION:   Presentation: Evolving clinical presentation with changing clinical characteristics: MODERATE COMPLEXITY   CLINICAL DECISION MAKING:   Outcome Measure:    Tool Used: Modified Oswestry Low Back Pain Questionnaire  Score:  Initial: 15/50  Most Recent: 12/50 (Date: 03-22-18 )   Interpretation of Score: Each section is scored on a 0-5 scale, 5 representing the greatest disability. The scores of each section are added together for a total score of 50. Score 0 1-10 11-20 21-30 31-40 41-49 50   Modifier CH CI CJ CK CL CM CN     ? Mobility - Walking and Moving Around:     - CURRENT STATUS: CJ - 20%-39% impaired, limited or restricted    - GOAL STATUS: CI - 1%-19% impaired, limited or restricted    - D/C STATUS:  ---------------To be determined---------------    Medical Necessity:   · Patient is expected to demonstrate progress in strength and range of motion to return to previous level of function. Reason for Services/Other Comments:  · Patient continues to require present interventions due to patient's inability to prolonged stand. Use of outcome tool(s) and clinical judgement create a POC that gives a: Questionable prediction of patient's progress: MODERATE COMPLEXITY            TREATMENT:   (In addition to Assessment/Re-Assessment sessions the following treatments were rendered)  Pre-treatment Symptoms/Complaints:  3/22/2018: Patient reports that she was in a lot of pain after dancing this weekend. Pain: Initial: Pain Intensity 1: 2  Post Session:  2/10     THERAPEUTIC EXERCISE: (30 minutes):  Exercises per grid below to improve mobility and strength. Required moderate visual, verbal and manual cues to promote proper body alignment, promote proper body posture and promote proper body mechanics. Progressed resistance, range and repetitions as indicated.    Date:  03-06-18 Date:  03-08-18 Date:  03-13-18 Date:  03-22-18   Activity/Exercise       SIJ MET resisted R hip flexion and L hip ext       bridge Figure 4 x 20 reps, regular 20 reps Figure 4 x 20 reps, regular x 20 reps Figure 4 x 20 reps each 20 reps figure 4   Tennis ball paraspinal and glute release       Nu-step 6 min level 4.0 6 min level 4.0 6 min level 4.0 6 min level 4.0   Standing theraband rows    20 reps blue   Standing lat pulls    20 reps blue   Active hamstring stretch       Hooklying posterior thigh/piriformis stretch       clamshell B LE 20 reps blue Black x 20 reps Blue x 20 reps Blue x 20 reps   March with glutes       SLR with TA  20 reps each 20 reps each 20 reps each   March with TA 20 reps each 20 reps 20 reps 20 reps each   Isometric oblques 20 reps each 20 reps each Blue x 20 reps each Blue x 20 reps each   Isometric hip flexion 10x5 sec hold each 10x5 sec hold 10x5 sec hold each 10x5 sec hold   Side stepping with band   2x30 ft blue    Isometric hip ER on wall       Quadruped rocking       Alt. UE flexion in quad       Cat/cow quadruped       adenike pose       March with glutes   2x30 ft    Side lying hip ABD 20 reps each 20 reps each 20 reps each    SIJ mobilization supine       Lumbar shift correction on wall 20 reps 20 reps     Horizontal ABD  greenx  20 reps     Monster walk   2x30 ft                     MANUAL THERAPY: (15 minutes): Soft tissue mobilization was utilized and necessary because of the patient's painful spasm and restricted motion of soft tissue. Rib7 mobilization, side lying SIJ mobilization on L, piriformis and glute release  MedBridge Portal  Treatment/Session Assessment:    · Response to Treatment:  Pt is progressing in strength and symptom management but continues to be limited by SIJ dysfunction. · Compliance with Program/Exercises: Will assess as treatment progresses. · Recommendations/Intent for next treatment session: \"Next visit will focus on advancements to more challenging activities\".   Total Treatment Duration:  PT Patient Time In/Time Out  Time In: 1630  Time Out: [address]

## 2018-03-27 ENCOUNTER — HOSPITAL ENCOUNTER (OUTPATIENT)
Dept: PHYSICAL THERAPY | Age: 63
Discharge: HOME | End: 2018-03-27
Attending: FAMILY MEDICINE
Payer: COMMERCIAL

## 2018-03-27 PROCEDURE — 97140 MANUAL THERAPY 1/> REGIONS: CPT

## 2018-03-27 PROCEDURE — 97110 THERAPEUTIC EXERCISES: CPT

## 2018-03-27 NOTE — PROGRESS NOTES
Tonya Reynoso  : 1955  Primary: [facility]  Secondary:  [address] at [facility]  [address], [address] [facility], [address] [facility]  [phone]   [phone]          OUTPATIENT PHYSICAL THERAPY:Daily Note 3/27/2018    ICD-10: Treatment Diagnosis: low back pain M54.5  Precautions/Allergies:   Review of patient's allergies indicates no known allergies. Fall Risk Score: 0 (? 5 = High Risk)  MD Orders: Eval and treat MEDICAL/REFERRING DIAGNOSIS:  Pain in thoracic spine [M54.6]  Other chronic pain [G89.29]  Sacrococcygeal disorders, not elsewhere classified [M53.3]   DATE OF ONSET: 18  REFERRING PHYSICIAN: Clark Peterson MD  RETURN PHYSICIAN APPOINTMENT: TBD     INITIAL ASSESSMENT:  Ms. Barbara Serrano has attended 13 visits since 18 with good improvement with glute and TA strength and has improved in symptom management. Continues to present with tender and hypertonic L lumbar paraspinals and L glutes at iliac crest, fair glute strength and activation, decreased TA strength and excessive lumbar lordosis that limits ability to bend over and perform daily activities. Pt would benefit from skilled therapy to address these deficits for return to previous level of function. PROBLEM LIST (Impacting functional limitations):  1. Decreased Strength  2. Decreased ADL/Functional Activities  3. Increased Pain  4. Decreased Flexibility/Joint Mobility  5. Decreased [place] with Home Exercise Program INTERVENTIONS PLANNED:  1. Cold  2. Electrical Stimulation  3. Heat  4. Home Exercise Program (HEP)  5. Manual Therapy  6. Range of Motion (ROM)  7. Therapeutic Exercise/Strengthening   TREATMENT PLAN:  Effective Dates: 18 TO 18. Frequency/Duration: 2 times a week for 6 weeks  GOALS: (Goals have been discussed and agreed upon with patient.)  Short-Term Functional Goals: Time Frame: 4 weeks  1.  Pt will be I with HEP to allow for continued progress with symptom management. MET   2. Pt will demonstrate 15 glute bridges without hamstring activation to demonstrate improved neuromuscular utilization of glutes for functional activities. MET 03-22-18  Discharge Goals: Time Frame: 6 weeks  1. Pt will improve VIPIN score to <10 to reflect an improvement in function. Ongoing  2. Pt will improve c/o pain to 2/10 to allow for improved tolerance for work duties. Ongoing  3. Pt will improve glute strength to 5/5 for improved SIJ stability during functional activities. Ongoing  Rehabilitation Potential For Stated Goals: Good            The information in this section was collected on 01-26-18 (except where otherwise noted). HISTORY:   History of Present Injury/Illness (Reason for Referral):  Pt is a 65 y.o. Female presenting to PT with chronic LBP. 3 weeks ago lifted something that was pretty heavy and then had some pain during intercourse and has had some pain in her left sided low back. Pain has improved since onset. In the beginning pain was a sharp catching pain and had trouble standing and walking. Is currently taking ibuprofen and meloxicam.  Has trouble finding a comfortable position but often sleeps on her side. Has been diagnosed with scoliosis in the past.  Has also been told that she has 1/4-1/2 LLI with L shorter. Has been diagnosed with osteopenia  Past Medical History/Comorbidities:   Ms. Kandice Rosas  has a past medical history of Anxiety state, unspecified; Carpal tunnel syndrome; Essential hypertension, benign; Insomnia, unspecified; Pain in joint, multiple sites; Plantar fascial fibromatosis; Postmenopausal atrophic vaginitis; and Unspecified hypothyroidism. Ms. Kandice Rosas  has a past surgical history that includes hx carpal tunnel release (Bilateral, 8/2010) and hx cataract removal (8/06).   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 Max Daily Amount: 30 mg., Disp: 90 Cap, Rfl: 1    conjugated estrogens (PREMARIN) 0.625 mg/gram vaginal cream, Insert 1.5 g into vagina daily. , Disp: 45 g, Rfl: 11   Date Last Reviewed:  3/27/2018    Number of Personal Factors/Comorbidities that affect the Plan of Care: 1-2: MODERATE COMPLEXITY   EXAMINATION:   Observation/Orthostatic Postural Assessment:          Excessive lumbar lordosis  Palpation:          Tenderness and hypertonicity in L lumbar and thoracic paraspinals and glutes        T4-5 improved symptoms with grade III CPA  ROM:          LUMBAR SPINE    AROM    Flexion   Extension  Right Rotation  Left Rotation  Right Sidebend  Left Sidebend 90 %  80 %   80 %   80 %  65 %  65 %      Strength:          LE- 5/5 bilaterally  Except glutes 4+/5       TA- 4+/5  Special Tests:          SIJ CPR-  Weakly positive        Slump-  Negative  Functional Mobility:         WNL   Body Structures Involved:  1. Muscles Body Functions Affected:  1. Sensory/Pain  2. Movement Related Activities and Participation Affected:  1. General Tasks and Demands  2. Mobility   Number of elements (examined above) that affect the Plan of Care: 3: MODERATE COMPLEXITY   CLINICAL PRESENTATION:   Presentation: Evolving clinical presentation with changing clinical characteristics: MODERATE COMPLEXITY   CLINICAL DECISION MAKING:   Outcome Measure:    Tool Used: Modified Oswestry Low Back Pain Questionnaire  Score:  Initial: 15/50  Most Recent: 12/50 (Date: 03-22-18 )   Interpretation of Score: Each section is scored on a 0-5 scale, 5 representing the greatest disability. The scores of each section are added together for a total score of 50. Score 0 1-10 11-20 21-30 31-40 41-49 50   Modifier CH CI CJ CK CL CM CN     ? Mobility - Walking and Moving Around:     - CURRENT STATUS: CJ - 20%-39% impaired, limited or restricted    - GOAL STATUS: CI - 1%-19% impaired, limited or restricted    - D/C STATUS:  ---------------To be determined---------------    Medical Necessity:   · Patient is expected to demonstrate progress in strength and range of motion to return to previous level of function. Reason for Services/Other Comments:  · Patient continues to require present interventions due to patient's inability to prolonged stand. Use of outcome tool(s) and clinical judgement create a POC that gives a: Questionable prediction of patient's progress: MODERATE COMPLEXITY            TREATMENT:   (In addition to Assessment/Re-Assessment sessions the following treatments were rendered)  Pre-treatment Symptoms/Complaints:  3/27/2018: Patient reports that she had a bad night the other night  Pain: Initial: Pain Intensity 1: 2  Post Session:  2/10     THERAPEUTIC EXERCISE: (30 minutes):  Exercises per grid below to improve mobility and strength. Required moderate visual, verbal and manual cues to promote proper body alignment, promote proper body posture and promote proper body mechanics. Progressed resistance, range and repetitions as indicated.    Date:  03-08-18 Date:  03-13-18 Date:  03-22-18 Date:  03-27-18   Activity/Exercise       SIJ MET resisted R hip flexion and L hip ext       bridge Figure 4 x 20 reps, regular x 20 reps Figure 4 x 20 reps each 20 reps figure 4 20 reps single leg   Tennis ball paraspinal and glute release       Nu-step 6 min level 4.0 6 min level 4.0 6 min level 4.0 6 min level 4.0   Standing theraband rows   20 reps blue    Standing lat pulls   20 reps blue    Active hamstring stretch       Hooklying posterior thigh/piriformis stretch clamshell Black x 20 reps Blue x 20 reps Blue x 20 reps Blue 20 reps   March with glutes       SLR with TA 20 reps each 20 reps each 20 reps each 20 reps   March with TA 20 reps 20 reps 20 reps each 20 reps   Isometric oblques 20 reps each Blue x 20 reps each Blue x 20 reps each    Isometric hip flexion 10x5 sec hold 10x5 sec hold each 10x5 sec hold 10x5 sec hold   Side stepping with band  2x30 ft blue  2x30 ft   Isometric hip ER on wall       Quadruped rocking       Alt. UE flexion in quad       Cat/cow quadruped       adenike pose       March with glutes  2x30 ft  2x30 ft   Side lying hip ABD 20 reps each 20 reps each     SIJ mobilization supine       Lumbar shift correction on wall 20 reps      Horizontal ABD greenx  20 reps   20 reps blue   Monster walk  2x30 ft     Pull down with TA    20 reps green             MANUAL THERAPY: (15 minutes): Soft tissue mobilization was utilized and necessary because of the patient's painful spasm and restricted motion of soft tissue. Rib7 mobilization, side lying SIJ mobilization on L, piriformis and glute release  MedBridge Portal  Treatment/Session Assessment:    · Response to Treatment:  Pt is progressing well with strength but muscles fatigue easily with standing and bending. · Compliance with Program/Exercises: Will assess as treatment progresses. · Recommendations/Intent for next treatment session: \"Next visit will focus on advancements to more challenging activities\".   Total Treatment Duration:  PT Patient Time In/Time Out  Time In: 1630  Time Out: 120 [facility]

## 2018-04-03 ENCOUNTER — HOSPITAL ENCOUNTER (OUTPATIENT)
Dept: PHYSICAL THERAPY | Age: 63
Discharge: HOME | End: 2018-04-03
Attending: FAMILY MEDICINE
Payer: COMMERCIAL

## 2018-04-03 PROCEDURE — 97140 MANUAL THERAPY 1/> REGIONS: CPT

## 2018-04-03 PROCEDURE — 97110 THERAPEUTIC EXERCISES: CPT

## 2018-04-03 NOTE — PROGRESS NOTES
Richard Stahl  : 1955  Primary: Jazzmine Rico  Secondary:  [address] at [place] [id number] [facility], [address] [place] [address],[address], [facility] [address].  [phone]   [phone]          OUTPATIENT PHYSICAL THERAPY:Daily Note 4/3/2018    ICD-10: Treatment Diagnosis: low back pain M54.5  Precautions/Allergies:   Review of patient's allergies indicates no known allergies. Fall Risk Score: 0 (? 5 = High Risk)  MD Orders: Eval and treat MEDICAL/REFERRING DIAGNOSIS:  Pain in thoracic spine [M54.6]  Other chronic pain [G89.29]  Sacrococcygeal disorders, not elsewhere classified [M53.3]   DATE OF ONSET: 18  REFERRING PHYSICIAN: Cyril Bravo MD  RETURN PHYSICIAN APPOINTMENT: TBD     INITIAL ASSESSMENT:  Ms. Betsy Chadwick has attended 13 visits since 18 with good improvement with glute and TA strength and has improved in symptom management. Continues to present with tender and hypertonic L lumbar paraspinals and L glutes at iliac crest, fair glute strength and activation, decreased TA strength and excessive lumbar lordosis that limits ability to bend over and perform daily activities. Pt would benefit from skilled therapy to address these deficits for return to previous level of function. PROBLEM LIST (Impacting functional limitations):  1. Decreased Strength  2. Decreased ADL/Functional Activities  3. Increased Pain  4. Decreased Flexibility/Joint Mobility  5. Decreased [place] with Home Exercise Program INTERVENTIONS PLANNED:  1. Cold  2. Electrical Stimulation  3. Heat  4. Home Exercise Program (HEP)  5. Manual Therapy  6. Range of Motion (ROM)  7. Therapeutic Exercise/Strengthening   TREATMENT PLAN:  Effective Dates: 18 TO 18. Frequency/Duration: 2 times a week for 6 weeks  GOALS: (Goals have been discussed and agreed upon with patient.)  Short-Term Functional Goals: Time Frame: 4 weeks  1.  Pt will be I with HEP to allow for continued progress with symptom management. MET   2. Pt will demonstrate 15 glute bridges without hamstring activation to demonstrate improved neuromuscular utilization of glutes for functional activities. MET 03-22-18  Discharge Goals: Time Frame: 6 weeks  1. Pt will improve VIPIN score to <10 to reflect an improvement in function. Ongoing  2. Pt will improve c/o pain to 2/10 to allow for improved tolerance for work duties. Ongoing  3. Pt will improve glute strength to 5/5 for improved SIJ stability during functional activities. Ongoing  Rehabilitation Potential For Stated Goals: Good            The information in this section was collected on 01-26-18 (except where otherwise noted). HISTORY:   History of Present Injury/Illness (Reason for Referral):  Pt is a 65 y.o. Female presenting to PT with chronic LBP. 3 weeks ago lifted something that was pretty heavy and then had some pain during intercourse and has had some pain in her left sided low back. Pain has improved since onset. In the beginning pain was a sharp catching pain and had trouble standing and walking. Is currently taking ibuprofen and meloxicam.  Has trouble finding a comfortable position but often sleeps on her side. Has been diagnosed with scoliosis in the past.  Has also been told that she has 1/4-1/2 LLI with L shorter. Has been diagnosed with osteopenia  Past Medical History/Comorbidities:   Ms. Osiris Jules  has a past medical history of Anxiety state, unspecified; Carpal tunnel syndrome; Essential hypertension, benign; Insomnia, unspecified; Pain in joint, multiple sites; Plantar fascial fibromatosis; Postmenopausal atrophic vaginitis; and Unspecified hypothyroidism. Ms. Osiris Jules  has a past surgical history that includes hx carpal tunnel release (Bilateral, 8/2010) and hx cataract removal (8/06).   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 Max Daily Amount: 30 mg., Disp: 90 Cap, Rfl: 1    conjugated estrogens (PREMARIN) 0.625 mg/gram vaginal cream, Insert 1.5 g into vagina daily. , Disp: 45 g, Rfl: 11   Date Last Reviewed:  4/3/2018    Number of Personal Factors/Comorbidities that affect the Plan of Care: 1-2: MODERATE COMPLEXITY   EXAMINATION:   Observation/Orthostatic Postural Assessment:          Excessive lumbar lordosis  Palpation:          Tenderness and hypertonicity in L lumbar and thoracic paraspinals and glutes        T4-5 improved symptoms with grade III CPA  ROM:          LUMBAR SPINE    AROM    Flexion   Extension  Right Rotation  Left Rotation  Right Sidebend  Left Sidebend 90 %  80 %   80 %   80 %  65 %  65 %      Strength:          LE- 5/5 bilaterally  Except glutes 4+/5       TA- 4+/5  Special Tests:          SIJ CPR-  Weakly positive        Slump-  Negative  Functional Mobility:         WNL   Body Structures Involved:  1. Muscles Body Functions Affected:  1. Sensory/Pain  2. Movement Related Activities and Participation Affected:  1. General Tasks and Demands  2. Mobility   Number of elements (examined above) that affect the Plan of Care: 3: MODERATE COMPLEXITY   CLINICAL PRESENTATION:   Presentation: Evolving clinical presentation with changing clinical characteristics: MODERATE COMPLEXITY   CLINICAL DECISION MAKING:   Outcome Measure: Tool Used: Modified Oswestry Low Back Pain Questionnaire  Score:  Initial: 15/50  Most Recent: 12/50 (Date: 03-22-18 )   Interpretation of Score: Each section is scored on a 0-5 scale, 5 representing the greatest disability. The scores of each section are added together for a total score of 50. Score 0 1-10 11-20 21-30 31-40 41-49 50   Modifier CH CI CJ CK CL CM CN     ? Mobility - Walking and Moving Around:     - CURRENT STATUS: CJ - 20%-39% impaired, limited or restricted    - GOAL STATUS: CI - 1%-19% impaired, limited or restricted    - D/C STATUS:  ---------------To be determined---------------    Medical Necessity:   · Patient is expected to demonstrate progress in strength and range of motion to return to previous level of function. Reason for Services/Other Comments:  · Patient continues to require present interventions due to patient's inability to prolonged stand. Use of outcome tool(s) and clinical judgement create a POC that gives a: Questionable prediction of patient's progress: MODERATE COMPLEXITY            TREATMENT:   (In addition to Assessment/Re-Assessment sessions the following treatments were rendered)  Pre-treatment Symptoms/Complaints:  4/3/2018: Patient reports that she has had only mild pain recently. Pain: Initial: Pain Intensity 1: 2  Post Session:  2/10     THERAPEUTIC EXERCISE: (30 minutes):  Exercises per grid below to improve mobility and strength. Required moderate visual, verbal and manual cues to promote proper body alignment, promote proper body posture and promote proper body mechanics. Progressed resistance, range and repetitions as indicated.    Date:  03-13-18 Date:  03-22-18 Date:  03-27-18 Date:  04-03-18   Activity/Exercise       SIJ MET resisted R hip flexion and L hip ext    3x5 sec hold   bridge Figure 4 x 20 reps each 20 reps figure 4 20 reps single leg Single leg x 20 reps each and regular x 20 reps   Tennis ball paraspinal and glute release       Nu-step 6 min level 4.0 6 min level 4.0 6 min level 4.0 6 min level 4.0   Standing theraband rows  20 reps blue     Standing lat pulls  20 reps blue     Active hamstring stretch       Hooklying posterior thigh/piriformis stretch       clamshell Blue x 20 reps Blue x 20 reps Blue 20 reps    March with glutes       SLR with TA 20 reps each 20 reps each 20 reps 20 reps each   March with TA 20 reps 20 reps each 20 reps    Isometric oblques Blue x 20 reps each Blue x 20 reps each  Blue x 20 reps each with TA   Isometric hip flexion 10x5 sec hold each 10x5 sec hold 10x5 sec hold With TA x 20 reps each   Side stepping with band 2x30 ft blue  2x30 ft    Isometric hip ER on wall       Quadruped rocking       Alt. UE flexion in quad       Cat/cow quadruped       adenike pose       March with glutes 2x30 ft  2x30 ft    Side lying hip ABD 20 reps each      SIJ mobilization supine       Lumbar shift correction on wall       Horizontal ABD   20 reps blue    Monster walk 2x30 ft      Pull down with TA   20 reps green    TA isolation    5 min      MANUAL THERAPY: (15 minutes): Soft tissue mobilization was utilized and necessary because of the patient's painful spasm and restricted motion of soft tissue. Rib7 mobilization, side lying SIJ mobilization on L, piriformis and glute release  MedBridge Portal  Treatment/Session Assessment:    · Response to Treatment:  Pt responded well to TA activation although had difficulty isolating muscle. · Compliance with Program/Exercises: Will assess as treatment progresses. · Recommendations/Intent for next treatment session: \"Next visit will focus on advancements to more challenging activities\".   Total Treatment Duration:  PT Patient Time In/Time Out  Time In: 1630  Time Out: 120 [facility]

## 2018-04-10 ENCOUNTER — HOSPITAL ENCOUNTER (OUTPATIENT)
Dept: PHYSICAL THERAPY | Age: 63
Discharge: HOME | End: 2018-04-10
Attending: FAMILY MEDICINE
Payer: COMMERCIAL

## 2018-04-10 PROCEDURE — 97110 THERAPEUTIC EXERCISES: CPT

## 2018-04-10 PROCEDURE — 97140 MANUAL THERAPY 1/> REGIONS: CPT

## 2018-04-10 NOTE — PROGRESS NOTES
Dara Player  : 1955  Primary: [place] [place]  Secondary:  [address]  at [facility]  [address] [facility] [address], [facility] [address].  [phone]   [phone]          OUTPATIENT PHYSICAL THERAPY:Daily Note 4/10/2018    ICD-10: Treatment Diagnosis: low back pain M54.5  Precautions/Allergies:   Review of patient's allergies indicates no known allergies. Fall Risk Score: 0 (? 5 = High Risk)  MD Orders: Eval and treat MEDICAL/REFERRING DIAGNOSIS:  Pain in thoracic spine [M54.6]  Other chronic pain [G89.29]  Sacrococcygeal disorders, not elsewhere classified [M53.3]   DATE OF ONSET: 18  REFERRING PHYSICIAN: May Arboleda MD  RETURN PHYSICIAN APPOINTMENT: TBD     INITIAL ASSESSMENT:  Ms. El Workman has attended 13 visits since 18 with good improvement with glute and TA strength and has improved in symptom management. Continues to present with tender and hypertonic L lumbar paraspinals and L glutes at iliac crest, fair glute strength and activation, decreased TA strength and excessive lumbar lordosis that limits ability to bend over and perform daily activities. Pt would benefit from skilled therapy to address these deficits for return to previous level of function. PROBLEM LIST (Impacting functional limitations):  1. Decreased Strength  2. Decreased ADL/Functional Activities  3. Increased Pain  4. Decreased Flexibility/Joint Mobility  5. Decreased [place] with Home Exercise Program INTERVENTIONS PLANNED:  1. Cold  2. Electrical Stimulation  3. Heat  4. Home Exercise Program (HEP)  5. Manual Therapy  6. Range of Motion (ROM)  7. Therapeutic Exercise/Strengthening   TREATMENT PLAN:  Effective Dates: 18 TO 18. Frequency/Duration: 2 times a week for 6 weeks  GOALS: (Goals have been discussed and agreed upon with patient.)  Short-Term Functional Goals: Time Frame: 4 weeks  1.  Pt will be I with HEP to allow for continued progress with symptom management. MET   2. Pt will demonstrate 15 glute bridges without hamstring activation to demonstrate improved neuromuscular utilization of glutes for functional activities. MET 03-22-18  Discharge Goals: Time Frame: 6 weeks  1. Pt will improve VIPIN score to <10 to reflect an improvement in function. Ongoing  2. Pt will improve c/o pain to 2/10 to allow for improved tolerance for work duties. Ongoing  3. Pt will improve glute strength to 5/5 for improved SIJ stability during functional activities. Ongoing  Rehabilitation Potential For Stated Goals: Good            The information in this section was collected on 01-26-18 (except where otherwise noted). HISTORY:   History of Present Injury/Illness (Reason for Referral):  Pt is a 65 y.o. Female presenting to PT with chronic LBP. 3 weeks ago lifted something that was pretty heavy and then had some pain during intercourse and has had some pain in her left sided low back. Pain has improved since onset. In the beginning pain was a sharp catching pain and had trouble standing and walking. Is currently taking ibuprofen and meloxicam.  Has trouble finding a comfortable position but often sleeps on her side. Has been diagnosed with scoliosis in the past.  Has also been told that she has 1/4-1/2 LLI with L shorter. Has been diagnosed with osteopenia  Past Medical History/Comorbidities:   Ms. Tiff Eaton  has a past medical history of Anxiety state, unspecified; Carpal tunnel syndrome; Essential hypertension, benign; Insomnia, unspecified; Pain in joint, multiple sites; Plantar fascial fibromatosis; Postmenopausal atrophic vaginitis; and Unspecified hypothyroidism. Ms. Tiff Eaton  has a past surgical history that includes hx carpal tunnel release (Bilateral, 8/2010) and hx cataract removal (8/06).   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 Max Daily Amount: 30 mg., Disp: 90 Cap, Rfl: 1    conjugated estrogens (PREMARIN) 0.625 mg/gram vaginal cream, Insert 1.5 g into vagina daily. , Disp: 45 g, Rfl: 11   Date Last Reviewed:  4/10/2018    Number of Personal Factors/Comorbidities that affect the Plan of Care: 1-2: MODERATE COMPLEXITY   EXAMINATION:   Observation/Orthostatic Postural Assessment:          Excessive lumbar lordosis  Palpation:          Tenderness and hypertonicity in L lumbar and thoracic paraspinals and glutes        T4-5 improved symptoms with grade III CPA  ROM:          LUMBAR SPINE    AROM    Flexion   Extension  Right Rotation  Left Rotation  Right Sidebend  Left Sidebend 90 %  80 %   80 %   80 %  65 %  65 %      Strength:          LE- 5/5 bilaterally  Except glutes 4+/5       TA- 4+/5  Special Tests:          SIJ CPR-  Weakly positive        Slump-  Negative  Functional Mobility:         WNL   Body Structures Involved:  1. Muscles Body Functions Affected:  1. Sensory/Pain  2. Movement Related Activities and Participation Affected:  1. General Tasks and Demands  2. Mobility   Number of elements (examined above) that affect the Plan of Care: 3: MODERATE COMPLEXITY   CLINICAL PRESENTATION:   Presentation: Evolving clinical presentation with changing clinical characteristics: MODERATE COMPLEXITY   CLINICAL DECISION MAKING:   Outcome Measure:    Tool Used: Modified Oswestry Low Back Pain Questionnaire  Score:  Initial: 15/50  Most Recent: 12/50 (Date: 03-22-18 )   Interpretation of Score: Each section is scored on a 0-5 scale, 5 representing the greatest disability. The scores of each section are added together for a total score of 50. Score 0 1-10 11-20 21-30 31-40 41-49 50   Modifier CH CI CJ CK CL CM CN     ? Mobility - Walking and Moving Around:     - CURRENT STATUS: CJ - 20%-39% impaired, limited or restricted    - GOAL STATUS: CI - 1%-19% impaired, limited or restricted    - D/C STATUS:  ---------------To be determined---------------    Medical Necessity:   · Patient is expected to demonstrate progress in strength and range of motion to return to previous level of function. Reason for Services/Other Comments:  · Patient continues to require present interventions due to patient's inability to prolonged stand. Use of outcome tool(s) and clinical judgement create a POC that gives a: Questionable prediction of patient's progress: MODERATE COMPLEXITY            TREATMENT:   (In addition to Assessment/Re-Assessment sessions the following treatments were rendered)  Pre-treatment Symptoms/Complaints:  4/10/2018: Patient reports that she has had only mild pain recently. Pain: Initial: Pain Intensity 1: 2  Post Session:  2/10     THERAPEUTIC EXERCISE: (30 minutes):  Exercises per grid below to improve mobility and strength. Required moderate visual, verbal and manual cues to promote proper body alignment, promote proper body posture and promote proper body mechanics. Progressed resistance, range and repetitions as indicated.    Date:  03-22-18 Date:  03-27-18 Date:  04-03-18 Date:  04-10-18   Activity/Exercise       SIJ MET resisted R hip flexion and L hip ext   3x5 sec hold    bridge 20 reps figure 4 20 reps single leg Single leg x 20 reps each and regular x 20 reps 20 reps   Tennis ball paraspinal and glute release       Nu-step 6 min level 4.0 6 min level 4.0 6 min level 4.0 6 min level 4.0   Standing theraband rows 20 reps blue      Standing lat pulls 20 reps blue      Active hamstring stretch       Hooklying posterior thigh/piriformis stretch       clamshell Blue x 20 reps Blue 20 reps  Blue with TA x 20 reps   March with glutes       SLR with TA 20 reps each 20 reps 20 reps each 20 reps each   March with TA 20 reps each 20 reps     Isometric oblques Blue x 20 reps each  Blue x 20 reps each with TA    Isometric hip flexion 10x5 sec hold 10x5 sec hold With TA x 20 reps each    Side stepping with band  2x30 ft     Isometric hip ER on wall       Quadruped rocking       Alt. UE flexion in quad       Cat/cow quadruped    Cat only x 20 reps   adenike pose       March with glutes  2x30 ft     Side lying hip ABD       SIJ mobilization supine       Lumbar shift correction on wall       Horizontal ABD  20 reps blue     Monster walk       Pull down with TA  20 reps green     TA isolation   5 min 5 min   sidebend stretch in doorway    3x30 sec hold   Dynamic hug    Green x 20 reps   Quadruped Alt LE ext     20 reps   TA with supine march    20 reps             MANUAL THERAPY: (15 minutes): Soft tissue mobilization was utilized and necessary because of the patient's painful spasm and restricted motion of soft tissue. Rib7 mobilization, side lying SIJ mobilization on L, piriformis and glute release  MedBridge Portal  Treatment/Session Assessment:    · Response to Treatment:  D/C cow portion of cat/cow due to excessive lumbar lordosis. · Compliance with Program/Exercises: Will assess as treatment progresses. · Recommendations/Intent for next treatment session: \"Next visit will focus on advancements to more challenging activities\".   Total Treatment Duration:  PT Patient Time In/Time Out  Time In: 1630  Time Out: 120 [facility]

## 2018-04-17 ENCOUNTER — HOSPITAL ENCOUNTER (OUTPATIENT)
Dept: PHYSICAL THERAPY | Age: 63
Discharge: HOME | End: 2018-04-17
Attending: FAMILY MEDICINE
Payer: COMMERCIAL

## 2018-04-17 PROCEDURE — G8979 MOBILITY GOAL STATUS: HCPCS

## 2018-04-17 PROCEDURE — G8980 MOBILITY D/C STATUS: HCPCS

## 2018-04-17 PROCEDURE — 97110 THERAPEUTIC EXERCISES: CPT

## 2018-04-17 PROCEDURE — 97140 MANUAL THERAPY 1/> REGIONS: CPT

## 2018-09-07 ENCOUNTER — HOSPITAL ENCOUNTER (OUTPATIENT)
Dept: MAMMOGRAPHY | Age: 63
Discharge: HOME | End: 2018-09-07
Attending: PHYSICIAN ASSISTANT
Payer: COMMERCIAL

## 2018-09-07 DIAGNOSIS — Z12.39 SCREENING FOR BREAST CANCER: ICD-10-CM

## 2018-09-07 PROCEDURE — 77067 SCR MAMMO BI INCL CAD: CPT

## 2019-08-20 PROBLEM — E66.09 CLASS 1 OBESITY DUE TO EXCESS CALORIES WITH SERIOUS COMORBIDITY AND BODY MASS INDEX (BMI) OF 31.0 TO 31.9 IN ADULT: Status: ACTIVE | Noted: 2019-08-20

## 2020-01-02 ENCOUNTER — HOSPITAL ENCOUNTER (OUTPATIENT)
Dept: MAMMOGRAPHY | Age: 65
Discharge: HOME | End: 2020-01-02
Attending: FAMILY MEDICINE
Payer: COMMERCIAL

## 2020-01-02 DIAGNOSIS — Z12.31 VISIT FOR SCREENING MAMMOGRAM: ICD-10-CM

## 2020-01-02 PROCEDURE — 77067 SCR MAMMO BI INCL CAD: CPT

## 2021-05-27 ENCOUNTER — HOSPITAL ENCOUNTER (OUTPATIENT)
Dept: MAMMOGRAPHY | Age: 66
Discharge: HOME | End: 2021-05-27
Attending: FAMILY MEDICINE
Payer: COMMERCIAL

## 2021-05-27 ENCOUNTER — HOSPITAL ENCOUNTER (OUTPATIENT)
Dept: GENERAL RADIOLOGY | Age: 66
Discharge: HOME | End: 2021-05-27
Attending: FAMILY MEDICINE
Payer: COMMERCIAL

## 2021-05-27 DIAGNOSIS — Z12.31 ENCOUNTER FOR SCREENING MAMMOGRAM FOR BREAST CANCER: ICD-10-CM

## 2021-05-27 DIAGNOSIS — Z78.0 POST-MENOPAUSE: ICD-10-CM

## 2021-05-27 DIAGNOSIS — G89.29 CHRONIC LEFT-SIDED THORACIC BACK PAIN: ICD-10-CM

## 2021-05-27 DIAGNOSIS — M54.50 CHRONIC BILATERAL LOW BACK PAIN WITHOUT SCIATICA: ICD-10-CM

## 2021-05-27 DIAGNOSIS — G89.29 CHRONIC BILATERAL LOW BACK PAIN WITHOUT SCIATICA: ICD-10-CM

## 2021-05-27 DIAGNOSIS — M54.6 CHRONIC LEFT-SIDED THORACIC BACK PAIN: ICD-10-CM

## 2021-05-27 PROCEDURE — 77080 DXA BONE DENSITY AXIAL: CPT

## 2021-05-27 PROCEDURE — 77067 SCR MAMMO BI INCL CAD: CPT

## 2021-05-27 PROCEDURE — 72110 X-RAY EXAM L-2 SPINE 4/>VWS: CPT

## 2021-05-27 PROCEDURE — 72072 X-RAY EXAM THORAC SPINE 3VWS: CPT

## 2022-01-03 PROBLEM — E66.3 OVERWEIGHT: Status: ACTIVE | Noted: 2019-08-20

## 2022-03-18 PROBLEM — E66.3 OVERWEIGHT: Status: ACTIVE | Noted: 2019-08-20

## 2022-06-20 ENCOUNTER — TELEPHONE (OUTPATIENT)
Dept: FAMILY MEDICINE CLINIC | Facility: CLINIC | Age: 67
End: 2022-06-20

## 2022-06-20 DIAGNOSIS — U07.1 COVID-19: Primary | ICD-10-CM

## 2022-06-20 RX ORDER — AZITHROMYCIN 250 MG/1
250 TABLET, FILM COATED ORAL DAILY
Qty: 6 TABLET | Refills: 0 | Status: SHIPPED | OUTPATIENT
Start: 2022-06-20 | End: 2022-07-08

## 2022-06-20 NOTE — TELEPHONE ENCOUNTER
Patient tested positive for Covid yesterday , symptoms started on Saturday. Pt wants to know if you can prescribe something for her.     Z pack    Prednisone

## 2022-07-07 PROBLEM — E66.09 CLASS 1 OBESITY DUE TO EXCESS CALORIES WITH SERIOUS COMORBIDITY AND BODY MASS INDEX (BMI) OF 30.0 TO 30.9 IN ADULT: Status: ACTIVE | Noted: 2019-08-20

## 2022-07-07 PROBLEM — E66.811 CLASS 1 OBESITY DUE TO EXCESS CALORIES WITH SERIOUS COMORBIDITY AND BODY MASS INDEX (BMI) OF 30.0 TO 30.9 IN ADULT: Status: ACTIVE | Noted: 2019-08-20

## 2022-07-08 ENCOUNTER — OFFICE VISIT (OUTPATIENT)
Dept: FAMILY MEDICINE CLINIC | Facility: CLINIC | Age: 67
End: 2022-07-08
Payer: MEDICARE

## 2022-07-08 VITALS
TEMPERATURE: 98.2 F | RESPIRATION RATE: 18 BRPM | DIASTOLIC BLOOD PRESSURE: 82 MMHG | OXYGEN SATURATION: 96 % | HEART RATE: 79 BPM | HEIGHT: 62 IN | BODY MASS INDEX: 30.18 KG/M2 | SYSTOLIC BLOOD PRESSURE: 130 MMHG | WEIGHT: 164 LBS

## 2022-07-08 DIAGNOSIS — F51.01 PRIMARY INSOMNIA: ICD-10-CM

## 2022-07-08 DIAGNOSIS — E78.00 PURE HYPERCHOLESTEROLEMIA: ICD-10-CM

## 2022-07-08 DIAGNOSIS — Z12.31 ENCOUNTER FOR SCREENING MAMMOGRAM FOR BREAST CANCER: ICD-10-CM

## 2022-07-08 DIAGNOSIS — Z12.11 COLON CANCER SCREENING: ICD-10-CM

## 2022-07-08 DIAGNOSIS — I10 ESSENTIAL HYPERTENSION, BENIGN: Primary | ICD-10-CM

## 2022-07-08 DIAGNOSIS — N95.2 VAGINAL ATROPHY: ICD-10-CM

## 2022-07-08 DIAGNOSIS — E66.09 CLASS 1 OBESITY DUE TO EXCESS CALORIES WITH SERIOUS COMORBIDITY AND BODY MASS INDEX (BMI) OF 30.0 TO 30.9 IN ADULT: ICD-10-CM

## 2022-07-08 LAB
ALBUMIN SERPL-MCNC: 4.1 G/DL (ref 3.2–4.6)
ALBUMIN/GLOB SERPL: 1.2 {RATIO} (ref 1.2–3.5)
ALP SERPL-CCNC: 62 U/L (ref 50–136)
ALT SERPL-CCNC: 37 U/L (ref 12–65)
ANION GAP SERPL CALC-SCNC: 6 MMOL/L (ref 7–16)
AST SERPL-CCNC: 27 U/L (ref 15–37)
BILIRUB SERPL-MCNC: 0.6 MG/DL (ref 0.2–1.1)
BUN SERPL-MCNC: 14 MG/DL (ref 8–23)
CALCIUM SERPL-MCNC: 9.1 MG/DL (ref 8.3–10.4)
CHLORIDE SERPL-SCNC: 108 MMOL/L (ref 98–107)
CHOLEST SERPL-MCNC: 117 MG/DL
CO2 SERPL-SCNC: 26 MMOL/L (ref 21–32)
CREAT SERPL-MCNC: 0.9 MG/DL (ref 0.6–1)
GLOBULIN SER CALC-MCNC: 3.5 G/DL (ref 2.3–3.5)
GLUCOSE SERPL-MCNC: 89 MG/DL (ref 65–100)
HDLC SERPL-MCNC: 45 MG/DL (ref 40–60)
HDLC SERPL: 2.6 {RATIO}
LDLC SERPL CALC-MCNC: 55 MG/DL
POTASSIUM SERPL-SCNC: 4.2 MMOL/L (ref 3.5–5.1)
PROT SERPL-MCNC: 7.6 G/DL (ref 6.3–8.2)
SODIUM SERPL-SCNC: 140 MMOL/L (ref 136–145)
TRIGL SERPL-MCNC: 85 MG/DL (ref 35–150)
VLDLC SERPL CALC-MCNC: 17 MG/DL (ref 6–23)

## 2022-07-08 PROCEDURE — 3017F COLORECTAL CA SCREEN DOC REV: CPT | Performed by: FAMILY MEDICINE

## 2022-07-08 PROCEDURE — 1036F TOBACCO NON-USER: CPT | Performed by: FAMILY MEDICINE

## 2022-07-08 PROCEDURE — 1123F ACP DISCUSS/DSCN MKR DOCD: CPT | Performed by: FAMILY MEDICINE

## 2022-07-08 PROCEDURE — G8428 CUR MEDS NOT DOCUMENT: HCPCS | Performed by: FAMILY MEDICINE

## 2022-07-08 PROCEDURE — G8399 PT W/DXA RESULTS DOCUMENT: HCPCS | Performed by: FAMILY MEDICINE

## 2022-07-08 PROCEDURE — 1090F PRES/ABSN URINE INCON ASSESS: CPT | Performed by: FAMILY MEDICINE

## 2022-07-08 PROCEDURE — G8417 CALC BMI ABV UP PARAM F/U: HCPCS | Performed by: FAMILY MEDICINE

## 2022-07-08 PROCEDURE — 99214 OFFICE O/P EST MOD 30 MIN: CPT | Performed by: FAMILY MEDICINE

## 2022-07-08 RX ORDER — TEMAZEPAM 15 MG/1
30 CAPSULE ORAL NIGHTLY PRN
COMMUNITY
End: 2022-07-08 | Stop reason: SDUPTHER

## 2022-07-08 RX ORDER — ROSUVASTATIN CALCIUM 10 MG/1
10 TABLET, COATED ORAL NIGHTLY
Qty: 90 TABLET | Refills: 3 | Status: SHIPPED | OUTPATIENT
Start: 2022-07-08 | End: 2022-10-07 | Stop reason: SDUPTHER

## 2022-07-08 RX ORDER — TEMAZEPAM 15 MG/1
30 CAPSULE ORAL NIGHTLY PRN
Qty: 180 CAPSULE | Refills: 1 | Status: SHIPPED | OUTPATIENT
Start: 2022-07-08 | End: 2022-10-06

## 2022-07-08 RX ORDER — CONJUGATED ESTROGENS 0.62 MG/G
1.5 CREAM VAGINAL
Qty: 42.5 G | Refills: 3 | Status: SHIPPED | OUTPATIENT
Start: 2022-07-08 | End: 2022-10-07 | Stop reason: SDUPTHER

## 2022-07-08 RX ORDER — LOSARTAN POTASSIUM 100 MG/1
100 TABLET ORAL DAILY
Qty: 90 TABLET | Refills: 3 | Status: SHIPPED | OUTPATIENT
Start: 2022-07-08 | End: 2022-10-07 | Stop reason: SDUPTHER

## 2022-07-08 ASSESSMENT — PATIENT HEALTH QUESTIONNAIRE - PHQ9
SUM OF ALL RESPONSES TO PHQ QUESTIONS 1-9: 0
SUM OF ALL RESPONSES TO PHQ9 QUESTIONS 1 & 2: 0
SUM OF ALL RESPONSES TO PHQ QUESTIONS 1-9: 0
1. LITTLE INTEREST OR PLEASURE IN DOING THINGS: 0
2. FEELING DOWN, DEPRESSED OR HOPELESS: 0
SUM OF ALL RESPONSES TO PHQ QUESTIONS 1-9: 0
SUM OF ALL RESPONSES TO PHQ QUESTIONS 1-9: 0

## 2022-07-08 ASSESSMENT — ENCOUNTER SYMPTOMS
VOMITING: 0
DIARRHEA: 0
COUGH: 0
SHORTNESS OF BREATH: 0
NAUSEA: 0

## 2022-07-08 NOTE — PATIENT INSTRUCTIONS
Patient Education        Body Mass Index: Care Instructions  Your Care Instructions     Body mass index (BMI) can help you see if your weight is raising your risk for health problems. It uses a formula to compare how much you weigh with how tallyou are.  A BMI lower than 18.5 is considered underweight.  A BMI between 18.5 and 24.9 is considered healthy.  A BMI between 25 and 29.9 is considered overweight. A BMI of 30 or higher is considered obese. If your BMI is in the normal range, it means that you have a lower risk for weight-related health problems. If your BMI is in the overweight or obese range, you may be at increased risk for weight-related health problems, such as high blood pressure, heart disease, stroke, arthritis or joint pain, and diabetes. If your BMI is in the underweight range, you may be at increased risk for health problems such as fatigue, lower protection (immunity) againstillness, muscle loss, bone loss, hair loss, and hormone problems. BMI is just one measure of your risk for weight-related health problems. You may be at higher risk for health problems if you are not active, you eat anunhealthy diet, or you drink too much alcohol or use tobacco products. Follow-up care is a key part of your treatment and safety. Be sure to make and go to all appointments, and call your doctor if you are having problems. It's also a good idea to know your test results and keep alist of the medicines you take. How can you care for yourself at home?  Practice healthy eating habits. This includes eating plenty of fruits, vegetables, whole grains, lean protein, and low-fat dairy.  If your doctor recommends it, get more exercise. Walking is a good choice. Bit by bit, increase the amount you walk every day. Try for at least 30 minutes on most days of the week.  Do not smoke. Smoking can increase your risk for health problems.  If you need help quitting, talk to your doctor about stop-smoking programs and medicines. These can increase your chances of quitting for good.  Limit alcohol to 2 drinks a day for men and 1 drink a day for women. Too much alcohol can cause health problems. If you have a BMI higher than 25   Your doctor may do other tests to check your risk for weight-related health problems. This may include measuring the distance around your waist. A waist measurement of more than 40 inches in men or 35 inches in women can increase the risk of weight-related health problems.  Talk with your doctor about steps you can take to stay healthy or improve your health. You may need to make lifestyle changes to lose weight and stay healthy, such as changing your diet and getting regular exercise. If you have a BMI lower than 18.5   Your doctor may do other tests to check your risk for health problems.  Talk with your doctor about steps you can take to stay healthy or improve your health. You may need to make lifestyle changes to gain or maintain weight and stay healthy, such as getting more healthy foods in your diet and doing exercises to build muscle. Where can you learn more? Go to https://[employer] [place]. org and sign in to your [employer] account. Enter S176 in the [facility] box to learn more about \"Body Mass Index: Care Instructions. \"     If you do not have an account, please click on the \"Sign Up Now\" link. Current as of: December 27, 2021               Content Version: 13.3  © 8248-4043 [employer]. Care instructions adapted under license by [facility] ([facility]). If you have questions about a medical condition or this instruction, always ask your healthcare professional. [place] [address] any warranty or liability for your use of this information.

## 2022-07-08 NOTE — PROGRESS NOTES
Angela Toth (:  1955) is a 78 y.o. female,Established patient, here for evaluation of the following chief complaint(s):  Follow-up (Chronic care follow up. Fasting. ), Hypertension (well-conntrolled), Cholesterol Problem (repeat fasting labs), and Medication Refill  Had covid in late , took paxlovid- has had runny nose, mild cough, fatigue off and on since then, but worsened Monday of this week-concerned she could have rebound covid- is feeling a lot better, today. ASSESSMENT/PLAN:  1. Essential hypertension, benign  Assessment & Plan:   Borderline controlled, continue current medications  Orders:  -     Comprehensive Metabolic Panel; Future  -     losartan (COZAAR) 100 MG tablet; Take 1 tablet by mouth daily TAKE 1 TABLET BY MOUTH EVERY DAY, Disp-90 tablet, R-3Normal  2. Pure hypercholesterolemia  Assessment & Plan:   Borderline controlled, continue current medications  Orders:  -     Comprehensive Metabolic Panel; Future  -     Lipid Panel; Future  -     rosuvastatin (CRESTOR) 10 MG tablet; Take 1 tablet by mouth at bedtime, Disp-90 tablet, R-3Normal  3. Primary insomnia  Assessment & Plan:   Borderline controlled, continue current medications  Orders:  -     temazepam (RESTORIL) 15 MG capsule; Take 2 capsules by mouth nightly as needed for Sleep for up to 90 days. , Disp-180 capsule, R-1Normal  4. Class 1 obesity due to excess calories with serious comorbidity and body mass index (BMI) of 30.0 to 30.9 in adult  Assessment & Plan:   Uncontrolled, lifestyle modifications recommended  BMI handout  5. Encounter for screening mammogram for breast cancer  -     [facility] DIGITAL SCREEN W OR WO CAD BILATERAL; Future  6. Colon cancer screening  -     Occult Blood, Fecal; Future  7. Vaginal atrophy  -   RF  conjugated estrogens (PREMARIN) 0.625 MG/GM vaginal cream; Place 1.5 g vaginally every 7 days, Vaginal, EVERY 7 DAYS Starting 2022, Disp-42.5 g, R-3, Normal  8.  Covid- possible rebound- I rec she quarantine at home another 5d to be safe, danielle avoid vulnerable people for next 5daqys    Return in about 3 months (around 10/8/2022) for fasting chronic care. Subjective   SUBJECTIVE/OBJECTIVE:  Hypertension  This is a chronic problem. The current episode started more than 1 year ago. The problem is unchanged. The problem is controlled. Pertinent negatives include no chest pain or shortness of breath. Hyperlipidemia  This is a chronic problem. The current episode started more than 1 year ago. The problem is controlled. Recent lipid tests were reviewed and are normal. Pertinent negatives include no chest pain or shortness of breath. Other  This is a new problem. The current episode started more than 1 month ago. The problem occurs intermittently. The problem has been gradually improving. Pertinent negatives include no chest pain, chills, coughing, fatigue, nausea or vomiting. Review of Systems   Constitutional: Negative for chills and fatigue. Respiratory: Negative for cough and shortness of breath. Cardiovascular: Negative for chest pain and leg swelling. Gastrointestinal: Negative for diarrhea, nausea and vomiting. Objective   Physical Exam  Vitals and nursing note reviewed. Constitutional:       General: She is not in acute distress. Appearance: Normal appearance. HENT:      Head: Normocephalic and atraumatic. Nose: Nose normal.      Mouth/Throat:      Pharynx: Oropharynx is clear. Eyes:      Extraocular Movements: Extraocular movements intact. Conjunctiva/sclera: Conjunctivae normal.   Cardiovascular:      Rate and Rhythm: Normal rate and regular rhythm. Pulses: Normal pulses. Heart sounds: Normal heart sounds. Pulmonary:      Effort: Pulmonary effort is normal.      Breath sounds: Normal breath sounds. Musculoskeletal:         General: No swelling or tenderness. Normal range of motion. Cervical back: Normal range of motion and neck supple. Skin:     General: Skin is warm and dry. Neurological:      General: No focal deficit present. Mental Status: She is alert. Mental status is at baseline. Psychiatric:         Mood and Affect: Mood normal.         Behavior: Behavior normal.                  An electronic signature was used to authenticate this note.     --Jame Servin MD

## 2022-08-01 ENCOUNTER — TELEPHONE (OUTPATIENT)
Dept: FAMILY MEDICINE CLINIC | Facility: CLINIC | Age: 67
End: 2022-08-01

## 2022-08-01 ENCOUNTER — OFFICE VISIT (OUTPATIENT)
Dept: FAMILY MEDICINE CLINIC | Facility: CLINIC | Age: 67
End: 2022-08-01
Payer: MEDICARE

## 2022-08-01 VITALS
WEIGHT: 157 LBS | SYSTOLIC BLOOD PRESSURE: 118 MMHG | RESPIRATION RATE: 18 BRPM | BODY MASS INDEX: 28.89 KG/M2 | TEMPERATURE: 97.6 F | HEIGHT: 62 IN | DIASTOLIC BLOOD PRESSURE: 70 MMHG | OXYGEN SATURATION: 98 % | HEART RATE: 81 BPM

## 2022-08-01 DIAGNOSIS — R19.7 DIARRHEA, UNSPECIFIED TYPE: ICD-10-CM

## 2022-08-01 DIAGNOSIS — R10.30 LOWER ABDOMINAL PAIN: ICD-10-CM

## 2022-08-01 DIAGNOSIS — N39.0 ACUTE UTI: Primary | ICD-10-CM

## 2022-08-01 LAB
ALBUMIN SERPL-MCNC: 3.8 G/DL (ref 3.2–4.6)
ALBUMIN/GLOB SERPL: 1 {RATIO} (ref 1.2–3.5)
ALP SERPL-CCNC: 57 U/L (ref 50–136)
ALT SERPL-CCNC: 52 U/L (ref 12–65)
ANION GAP SERPL CALC-SCNC: 6 MMOL/L (ref 7–16)
AST SERPL-CCNC: 23 U/L (ref 15–37)
BACTERIA URINE, POC: ABNORMAL
BASOPHILS # BLD: 0 K/UL (ref 0–0.2)
BASOPHILS NFR BLD: 1 % (ref 0–2)
BILIRUB SERPL-MCNC: 0.5 MG/DL (ref 0.2–1.1)
BILIRUBIN, URINE, POC: NEGATIVE
BLOOD URINE, POC: ABNORMAL
BUN SERPL-MCNC: 18 MG/DL (ref 8–23)
CALCIUM SERPL-MCNC: 9.2 MG/DL (ref 8.3–10.4)
CASTS URINE, POC: ABNORMAL
CHLORIDE SERPL-SCNC: 109 MMOL/L (ref 98–107)
CO2 SERPL-SCNC: 25 MMOL/L (ref 21–32)
CREAT SERPL-MCNC: 0.8 MG/DL (ref 0.6–1)
DIFFERENTIAL METHOD BLD: ABNORMAL
EOSINOPHIL # BLD: 0.1 K/UL (ref 0–0.8)
EOSINOPHIL NFR BLD: 1 % (ref 0.5–7.8)
EPI CELLS URINE, POC: ABNORMAL
ERYTHROCYTE [DISTWIDTH] IN BLOOD BY AUTOMATED COUNT: 11.6 % (ref 11.9–14.6)
GLOBULIN SER CALC-MCNC: 3.7 G/DL (ref 2.3–3.5)
GLUCOSE SERPL-MCNC: 84 MG/DL (ref 65–100)
GLUCOSE URINE, POC: NEGATIVE
HCT VFR BLD AUTO: 43.8 % (ref 35.8–46.3)
HGB BLD-MCNC: 14.9 G/DL (ref 11.7–15.4)
IMM GRANULOCYTES # BLD AUTO: 0 K/UL (ref 0–0.5)
IMM GRANULOCYTES NFR BLD AUTO: 0 % (ref 0–5)
KETONES, URINE, POC: ABNORMAL
LEUKOCYTE ESTERASE, URINE, POC: ABNORMAL
LYMPHOCYTES # BLD: 2.2 K/UL (ref 0.5–4.6)
LYMPHOCYTES NFR BLD: 27 % (ref 13–44)
MCH RBC QN AUTO: 32.8 PG (ref 26.1–32.9)
MCHC RBC AUTO-ENTMCNC: 34 G/DL (ref 31.4–35)
MCV RBC AUTO: 96.5 FL (ref 79.6–97.8)
MONOCYTES # BLD: 0.6 K/UL (ref 0.1–1.3)
MONOCYTES NFR BLD: 7 % (ref 4–12)
NEUTS SEG # BLD: 5.3 K/UL (ref 1.7–8.2)
NEUTS SEG NFR BLD: 64 % (ref 43–78)
NITRITE, URINE, POC: POSITIVE
NRBC # BLD: 0 K/UL (ref 0–0.2)
PH, URINE, POC: 5.5 (ref 4.6–8)
PLATELET # BLD AUTO: 252 K/UL (ref 150–450)
PMV BLD AUTO: 10.7 FL (ref 9.4–12.3)
POTASSIUM SERPL-SCNC: 4 MMOL/L (ref 3.5–5.1)
PROT SERPL-MCNC: 7.5 G/DL (ref 6.3–8.2)
PROTEIN,URINE, POC: NEGATIVE
RBC # BLD AUTO: 4.54 M/UL (ref 4.05–5.2)
RBC, URINE, POC: ABNORMAL
SODIUM SERPL-SCNC: 140 MMOL/L (ref 136–145)
SPECIFIC GRAVITY, URINE, POC: 1.02 (ref 1–1.03)
TRICHOMONAS URINE, POC: ABNORMAL
URINALYSIS CLARITY, POC: ABNORMAL
URINALYSIS COLOR, POC: YELLOW
UROBILINOGEN, POC: ABNORMAL
WBC # BLD AUTO: 8.2 K/UL (ref 4.3–11.1)
WBC, URINE, POC: ABNORMAL
YEAST, URINE, POC: ABNORMAL

## 2022-08-01 PROCEDURE — 1123F ACP DISCUSS/DSCN MKR DOCD: CPT | Performed by: FAMILY MEDICINE

## 2022-08-01 PROCEDURE — 99214 OFFICE O/P EST MOD 30 MIN: CPT | Performed by: FAMILY MEDICINE

## 2022-08-01 PROCEDURE — G8427 DOCREV CUR MEDS BY ELIG CLIN: HCPCS | Performed by: FAMILY MEDICINE

## 2022-08-01 PROCEDURE — 81000 URINALYSIS NONAUTO W/SCOPE: CPT | Performed by: FAMILY MEDICINE

## 2022-08-01 PROCEDURE — 1036F TOBACCO NON-USER: CPT | Performed by: FAMILY MEDICINE

## 2022-08-01 PROCEDURE — 1090F PRES/ABSN URINE INCON ASSESS: CPT | Performed by: FAMILY MEDICINE

## 2022-08-01 PROCEDURE — 3017F COLORECTAL CA SCREEN DOC REV: CPT | Performed by: FAMILY MEDICINE

## 2022-08-01 PROCEDURE — G8417 CALC BMI ABV UP PARAM F/U: HCPCS | Performed by: FAMILY MEDICINE

## 2022-08-01 PROCEDURE — G8399 PT W/DXA RESULTS DOCUMENT: HCPCS | Performed by: FAMILY MEDICINE

## 2022-08-01 RX ORDER — NITROFURANTOIN 25; 75 MG/1; MG/1
100 CAPSULE ORAL 2 TIMES DAILY
Qty: 14 CAPSULE | Refills: 0 | Status: SHIPPED | OUTPATIENT
Start: 2022-08-01 | End: 2022-08-08

## 2022-08-01 ASSESSMENT — PATIENT HEALTH QUESTIONNAIRE - PHQ9
1. LITTLE INTEREST OR PLEASURE IN DOING THINGS: 0
SUM OF ALL RESPONSES TO PHQ9 QUESTIONS 1 & 2: 0
SUM OF ALL RESPONSES TO PHQ QUESTIONS 1-9: 0
2. FEELING DOWN, DEPRESSED OR HOPELESS: 0

## 2022-08-01 ASSESSMENT — ENCOUNTER SYMPTOMS
COUGH: 0
CHANGE IN BOWEL HABIT: 1
SHORTNESS OF BREATH: 0
NAUSEA: 0
ABDOMINAL PAIN: 1
VOMITING: 0
DIARRHEA: 1

## 2022-08-01 NOTE — PROGRESS NOTES
Naaman Merlin (:  1955) is a 78 y.o. female,Established patient, here for evaluation of the following chief complaint(s): Other (Diarrhea for the last month. She had covid and took Paxlovid, diarrhea started after that. Some lower abd pain for a couple of days, as well.)         ASSESSMENT/PLAN:  1. Acute UTI  -     Culture, Urine; Future  -     AMB POC URINALYSIS DIP STICK MANUAL W/ MICRO BSSC= bld-mod, leuk sml, Nit+, 10-20 RBC, 3-5WBC, 2 + bacteria  -     nitrofurantoin, macrocrystal-monohydrate, (MACROBID) 100 MG capsule; Take 1 capsule by mouth in the morning and 1 capsule before bedtime. Do all this for 7 days. , Disp-14 capsule, R-0Normal  2. Lower abdominal pain  -     CBC with Auto Differential; Future  -     Comprehensive Metabolic Panel; Future  3. Diarrhea, unspecified type  -     Culture, Stool; Future  -     Ova and Parasite Examination; Future  -     Clostridium Difficile Toxin/Antigen; Future  -     Stool for WBC #1; Future      Return in about 2 weeks (around 8/15/2022) for office followup/recheck. Subjective   SUBJECTIVE/OBJECTIVE:  Other  This is a new problem. The current episode started more than 1 month ago. The problem occurs daily. The problem has been unchanged. Associated symptoms include abdominal pain and a change in bowel habit. Pertinent negatives include no chest pain, chills, coughing, fatigue, nausea or vomiting. Review of Systems   Constitutional:  Negative for chills and fatigue. Respiratory:  Negative for cough and shortness of breath. Cardiovascular:  Negative for chest pain and leg swelling. Gastrointestinal:  Positive for abdominal pain, change in bowel habit and diarrhea. Negative for nausea and vomiting. Objective   Physical Exam  Vitals and nursing note reviewed. Constitutional:       General: She is not in acute distress. Appearance: Normal appearance. HENT:      Head: Normocephalic and atraumatic.       Nose: Nose normal. Mouth/Throat:      Pharynx: Oropharynx is clear. Eyes:      Extraocular Movements: Extraocular movements intact. Conjunctiva/sclera: Conjunctivae normal.   Cardiovascular:      Rate and Rhythm: Normal rate and regular rhythm. Pulses: Normal pulses. Heart sounds: Normal heart sounds. Pulmonary:      Effort: Pulmonary effort is normal.      Breath sounds: Normal breath sounds. Abdominal:      General: Bowel sounds are normal. There is no distension. Palpations: Abdomen is soft. There is no mass. Tenderness: There is abdominal tenderness. There is no guarding or rebound. Comments: Mild suprapubic tenderness   Musculoskeletal:         General: No swelling or tenderness. Normal range of motion. Cervical back: Normal range of motion and neck supple. Skin:     General: Skin is warm and dry. Neurological:      General: No focal deficit present. Mental Status: She is alert. Mental status is at baseline. Psychiatric:         Mood and Affect: Mood normal.         Behavior: Behavior normal.              An electronic signature was used to authenticate this note.     --Yvette Bonilla MD

## 2022-08-01 NOTE — TELEPHONE ENCOUNTER
Patient called over the weekend and was having problems with diarrhea . She had taken paxlovid and wants appt today Monday august 1st with dr Davi Sykes.

## 2022-08-02 DIAGNOSIS — Z12.11 COLON CANCER SCREENING: ICD-10-CM

## 2022-08-02 DIAGNOSIS — R19.7 DIARRHEA, UNSPECIFIED TYPE: ICD-10-CM

## 2022-08-02 LAB
HEMOCCULT STL QL: NEGATIVE
WBC #/AREA STL HPF: NORMAL /HPF (ref 0–4)

## 2022-08-03 LAB
BACTERIA SPEC CULT: ABNORMAL
BACTERIA SPEC CULT: ABNORMAL
C DIFF GDH STL QL: NORMAL
C DIFF TOX A+B STL QL IA: NORMAL
C DIFF TOXIN INTERPRETATION: NORMAL
CLINICAL CONSIDERATION: NORMAL
REFLEX: NORMAL
SERVICE CMNT-IMP: ABNORMAL

## 2022-08-05 ENCOUNTER — TELEPHONE (OUTPATIENT)
Dept: FAMILY MEDICINE CLINIC | Facility: CLINIC | Age: 67
End: 2022-08-05

## 2022-08-05 NOTE — TELEPHONE ENCOUNTER
Pt is calling in regards to get clarification on the results of all the test she had done this week. She just wants to be sure everything is negative. Please give her a call.      Thank you

## 2022-08-10 LAB
BACTERIA ISLT: ABNORMAL
BACTERIA SPEC CULT: ABNORMAL
ORGANISM ID: ABNORMAL
SERVICE CMNT-IMP: ABNORMAL
SPECIMEN SOURCE: ABNORMAL

## 2022-08-11 DIAGNOSIS — R19.7 DIARRHEA, UNSPECIFIED TYPE: ICD-10-CM

## 2022-08-11 DIAGNOSIS — R19.7 DIARRHEA, UNSPECIFIED TYPE: Primary | ICD-10-CM

## 2022-08-15 ENCOUNTER — OFFICE VISIT (OUTPATIENT)
Dept: FAMILY MEDICINE CLINIC | Facility: CLINIC | Age: 67
End: 2022-08-15
Payer: MEDICARE

## 2022-08-15 VITALS
WEIGHT: 156 LBS | TEMPERATURE: 97.3 F | DIASTOLIC BLOOD PRESSURE: 62 MMHG | OXYGEN SATURATION: 96 % | HEART RATE: 86 BPM | BODY MASS INDEX: 28.53 KG/M2 | SYSTOLIC BLOOD PRESSURE: 118 MMHG

## 2022-08-15 DIAGNOSIS — R19.7 DIARRHEA, UNSPECIFIED TYPE: ICD-10-CM

## 2022-08-15 DIAGNOSIS — N30.00 ACUTE CYSTITIS WITHOUT HEMATURIA: Primary | ICD-10-CM

## 2022-08-15 LAB
BACTERIA URINE, POC: ABNORMAL
BILIRUBIN, URINE, POC: NEGATIVE
BLOOD URINE, POC: ABNORMAL
CASTS URINE, POC: ABNORMAL
EPI CELLS URINE, POC: ABNORMAL
GLUCOSE URINE, POC: NEGATIVE
KETONES, URINE, POC: NEGATIVE
LEUKOCYTE ESTERASE, URINE, POC: ABNORMAL
NITRITE, URINE, POC: POSITIVE
O+P SPEC MICRO: NORMAL
O+P STL CONC: NORMAL
PH, URINE, POC: 5.5 (ref 4.6–8)
PROTEIN,URINE, POC: NEGATIVE
RBC, URINE, POC: ABNORMAL
SPECIFIC GRAVITY, URINE, POC: 1.03 (ref 1–1.03)
SPECIMEN SOURCE: NORMAL
TRICHOMONAS URINE, POC: ABNORMAL
URINALYSIS CLARITY, POC: ABNORMAL
URINALYSIS COLOR, POC: YELLOW
UROBILINOGEN, POC: ABNORMAL
WBC, URINE, POC: ABNORMAL
YEAST, URINE, POC: ABNORMAL

## 2022-08-15 PROCEDURE — 99213 OFFICE O/P EST LOW 20 MIN: CPT | Performed by: FAMILY MEDICINE

## 2022-08-15 PROCEDURE — G8399 PT W/DXA RESULTS DOCUMENT: HCPCS | Performed by: FAMILY MEDICINE

## 2022-08-15 PROCEDURE — 1123F ACP DISCUSS/DSCN MKR DOCD: CPT | Performed by: FAMILY MEDICINE

## 2022-08-15 PROCEDURE — 3017F COLORECTAL CA SCREEN DOC REV: CPT | Performed by: FAMILY MEDICINE

## 2022-08-15 PROCEDURE — 1090F PRES/ABSN URINE INCON ASSESS: CPT | Performed by: FAMILY MEDICINE

## 2022-08-15 PROCEDURE — G8428 CUR MEDS NOT DOCUMENT: HCPCS | Performed by: FAMILY MEDICINE

## 2022-08-15 PROCEDURE — G8417 CALC BMI ABV UP PARAM F/U: HCPCS | Performed by: FAMILY MEDICINE

## 2022-08-15 PROCEDURE — 81000 URINALYSIS NONAUTO W/SCOPE: CPT | Performed by: FAMILY MEDICINE

## 2022-08-15 PROCEDURE — 1036F TOBACCO NON-USER: CPT | Performed by: FAMILY MEDICINE

## 2022-08-15 RX ORDER — SULFAMETHOXAZOLE AND TRIMETHOPRIM 800; 160 MG/1; MG/1
1 TABLET ORAL 2 TIMES DAILY
Qty: 14 TABLET | Refills: 0 | Status: SHIPPED | OUTPATIENT
Start: 2022-08-15 | End: 2022-08-15

## 2022-08-15 RX ORDER — CIPROFLOXACIN 250 MG/1
250 TABLET, FILM COATED ORAL 2 TIMES DAILY
Qty: 14 TABLET | Refills: 0 | Status: SHIPPED | OUTPATIENT
Start: 2022-08-15 | End: 2022-09-12 | Stop reason: SDUPTHER

## 2022-08-15 ASSESSMENT — PATIENT HEALTH QUESTIONNAIRE - PHQ9
SUM OF ALL RESPONSES TO PHQ QUESTIONS 1-9: 0
SUM OF ALL RESPONSES TO PHQ QUESTIONS 1-9: 0
SUM OF ALL RESPONSES TO PHQ9 QUESTIONS 1 & 2: 0
1. LITTLE INTEREST OR PLEASURE IN DOING THINGS: 0
SUM OF ALL RESPONSES TO PHQ QUESTIONS 1-9: 0
SUM OF ALL RESPONSES TO PHQ QUESTIONS 1-9: 0
2. FEELING DOWN, DEPRESSED OR HOPELESS: 0

## 2022-08-15 ASSESSMENT — ENCOUNTER SYMPTOMS
VOMITING: 0
COUGH: 0
NAUSEA: 0
DIARRHEA: 0
SHORTNESS OF BREATH: 0

## 2022-08-15 NOTE — PROGRESS NOTES
Carlos Matt (:  1955) is a 78 y.o. female,Established patient, here for evaluation of the following chief complaint(s):  Follow-up (UTI/diarrhea---had to give second stool sample, no results as of yet)         ASSESSMENT/PLAN:  1. Acute cystitis without hematuria        -    UA with Micro= Sml bld, sml leuk, + nit, 3-5 wnc/hpf, 2 + bact diplobacilli  -     sulfamethoxazole-trimethoprim (BACTRIM DS;SEPTRA DS) 800-160 MG per tablet; Take 1 tablet by mouth in the morning and 1 tablet before bedtime. Do all this for 7 days. , Disp-14 tablet, R-0Normal  2. Diarrhea, unspecified type      - slowly improving    Return in about 3 weeks (around 2022). Subjective   SUBJECTIVE/OBJECTIVE:  Other  This is a new problem. The current episode started 1 to 4 weeks ago. The problem has been gradually improving. Pertinent negatives include no chest pain, chills, coughing, fatigue, nausea or vomiting. Review of Systems   Constitutional:  Negative for chills and fatigue. Respiratory:  Negative for cough and shortness of breath. Cardiovascular:  Negative for chest pain and leg swelling. Gastrointestinal:  Negative for diarrhea, nausea and vomiting. Objective   Physical Exam  Vitals and nursing note reviewed. Constitutional:       General: She is not in acute distress. Appearance: Normal appearance. HENT:      Head: Normocephalic and atraumatic. Nose: Nose normal.      Mouth/Throat:      Pharynx: Oropharynx is clear. Eyes:      Extraocular Movements: Extraocular movements intact. Conjunctiva/sclera: Conjunctivae normal.   Cardiovascular:      Rate and Rhythm: Normal rate and regular rhythm. Pulses: Normal pulses. Heart sounds: Normal heart sounds. Pulmonary:      Effort: Pulmonary effort is normal.      Breath sounds: Normal breath sounds. Abdominal:      General: There is no distension. Palpations: Abdomen is soft.    Musculoskeletal:         General: No swelling or tenderness. Normal range of motion. Cervical back: Normal range of motion and neck supple. Skin:     General: Skin is warm and dry. Neurological:      General: No focal deficit present. Mental Status: She is alert. Mental status is at baseline. Psychiatric:         Mood and Affect: Mood normal.         Behavior: Behavior normal.              An electronic signature was used to authenticate this note.     --Justice Babcock MD

## 2022-08-22 LAB
O+P SPEC MICRO: NORMAL
O+P STL CONC: NORMAL
SPECIMEN SOURCE: NORMAL

## 2022-09-07 ENCOUNTER — HOSPITAL ENCOUNTER (OUTPATIENT)
Dept: MAMMOGRAPHY | Age: 67
Discharge: HOME | End: 2022-09-10
Payer: MEDICARE

## 2022-09-07 DIAGNOSIS — Z12.31 ENCOUNTER FOR SCREENING MAMMOGRAM FOR BREAST CANCER: ICD-10-CM

## 2022-09-07 PROCEDURE — 77067 SCR MAMMO BI INCL CAD: CPT

## 2022-09-12 ENCOUNTER — OFFICE VISIT (OUTPATIENT)
Dept: FAMILY MEDICINE CLINIC | Facility: CLINIC | Age: 67
End: 2022-09-12
Payer: MEDICARE

## 2022-09-12 VITALS
HEIGHT: 62 IN | DIASTOLIC BLOOD PRESSURE: 80 MMHG | HEART RATE: 77 BPM | BODY MASS INDEX: 29.44 KG/M2 | SYSTOLIC BLOOD PRESSURE: 118 MMHG | OXYGEN SATURATION: 97 % | RESPIRATION RATE: 18 BRPM | TEMPERATURE: 97.4 F | WEIGHT: 160 LBS

## 2022-09-12 DIAGNOSIS — N30.00 ACUTE CYSTITIS WITHOUT HEMATURIA: Primary | ICD-10-CM

## 2022-09-12 DIAGNOSIS — R19.7 DIARRHEA, UNSPECIFIED TYPE: ICD-10-CM

## 2022-09-12 LAB
BACTERIA URINE, POC: ABNORMAL
BILIRUBIN, URINE, POC: NEGATIVE
BLOOD URINE, POC: ABNORMAL
CASTS URINE, POC: ABNORMAL
EPI CELLS URINE, POC: ABNORMAL
GLUCOSE URINE, POC: NEGATIVE
KETONES, URINE, POC: NEGATIVE
LEUKOCYTE ESTERASE, URINE, POC: ABNORMAL
NITRITE, URINE, POC: NEGATIVE
PH, URINE, POC: 5.5 (ref 4.6–8)
PROTEIN,URINE, POC: NEGATIVE
RBC, URINE, POC: ABNORMAL
SPECIFIC GRAVITY, URINE, POC: 1.02 (ref 1–1.03)
TRICHOMONAS URINE, POC: ABNORMAL
URINALYSIS CLARITY, POC: CLEAR
URINALYSIS COLOR, POC: YELLOW
UROBILINOGEN, POC: ABNORMAL
WBC, URINE, POC: ABNORMAL
YEAST, URINE, POC: ABNORMAL

## 2022-09-12 PROCEDURE — 3017F COLORECTAL CA SCREEN DOC REV: CPT | Performed by: FAMILY MEDICINE

## 2022-09-12 PROCEDURE — 81000 URINALYSIS NONAUTO W/SCOPE: CPT | Performed by: FAMILY MEDICINE

## 2022-09-12 PROCEDURE — G8417 CALC BMI ABV UP PARAM F/U: HCPCS | Performed by: FAMILY MEDICINE

## 2022-09-12 PROCEDURE — 1036F TOBACCO NON-USER: CPT | Performed by: FAMILY MEDICINE

## 2022-09-12 PROCEDURE — G8427 DOCREV CUR MEDS BY ELIG CLIN: HCPCS | Performed by: FAMILY MEDICINE

## 2022-09-12 PROCEDURE — 1090F PRES/ABSN URINE INCON ASSESS: CPT | Performed by: FAMILY MEDICINE

## 2022-09-12 PROCEDURE — G8399 PT W/DXA RESULTS DOCUMENT: HCPCS | Performed by: FAMILY MEDICINE

## 2022-09-12 PROCEDURE — 99213 OFFICE O/P EST LOW 20 MIN: CPT | Performed by: FAMILY MEDICINE

## 2022-09-12 PROCEDURE — 1123F ACP DISCUSS/DSCN MKR DOCD: CPT | Performed by: FAMILY MEDICINE

## 2022-09-12 RX ORDER — CIPROFLOXACIN 250 MG/1
250 TABLET, FILM COATED ORAL 2 TIMES DAILY
Qty: 14 TABLET | Refills: 0 | Status: SHIPPED | OUTPATIENT
Start: 2022-09-12 | End: 2022-09-19

## 2022-09-12 ASSESSMENT — ENCOUNTER SYMPTOMS
DIARRHEA: 0
NAUSEA: 0
COUGH: 0
VOMITING: 0
SHORTNESS OF BREATH: 0

## 2022-09-12 ASSESSMENT — PATIENT HEALTH QUESTIONNAIRE - PHQ9
2. FEELING DOWN, DEPRESSED OR HOPELESS: 0
1. LITTLE INTEREST OR PLEASURE IN DOING THINGS: 0
SUM OF ALL RESPONSES TO PHQ9 QUESTIONS 1 & 2: 0
SUM OF ALL RESPONSES TO PHQ QUESTIONS 1-9: 0

## 2022-09-12 NOTE — PROGRESS NOTES
Valencia Dickson (:  1955) is a 78 y.o. female,Established patient, here for evaluation of the following chief complaint(s):  Follow-up (Follow up on UTI and diarrhea. States that both have resolved. )         ASSESSMENT/PLAN:  1. Acute cystitis without hematuria  -     RFciprofloxacin (CIPRO) 250 MG tablet; Take 1 tablet by mouth 2 times daily for 7 days, Disp-14 tablet, R-0Normal  -     AMB POC URINALYSIS DIP STICK MANUAL W/ MICRO BSSC- still 1+ di[plobacilli  2. Diarrhea, unspecified type- resolved    Fu here as scheduled, fasting and set up AWV asap         Subjective   SUBJECTIVE/OBJECTIVE:  Other  This is a new problem. The current episode started more than 1 month ago. The problem has been resolved. Pertinent negatives include no chest pain, chills, coughing, fatigue, nausea or vomiting. Review of Systems   Constitutional:  Negative for chills and fatigue. Respiratory:  Negative for cough and shortness of breath. Cardiovascular:  Negative for chest pain and leg swelling. Gastrointestinal:  Negative for diarrhea, nausea and vomiting. Objective   Physical Exam  Vitals and nursing note reviewed. Constitutional:       General: She is not in acute distress. Appearance: Normal appearance. HENT:      Head: Normocephalic and atraumatic. Nose: Nose normal.      Mouth/Throat:      Pharynx: Oropharynx is clear. Eyes:      Extraocular Movements: Extraocular movements intact. Conjunctiva/sclera: Conjunctivae normal.   Cardiovascular:      Rate and Rhythm: Normal rate and regular rhythm. Pulses: Normal pulses. Heart sounds: Normal heart sounds. Pulmonary:      Effort: Pulmonary effort is normal.      Breath sounds: Normal breath sounds. Musculoskeletal:         General: No swelling or tenderness. Normal range of motion. Cervical back: Normal range of motion and neck supple. Skin:     General: Skin is warm and dry.    Neurological:      General: No focal deficit present. Mental Status: She is alert. Mental status is at baseline. Psychiatric:         Mood and Affect: Mood normal.         Behavior: Behavior normal.              An electronic signature was used to authenticate this note.     --Lawanda Barfield MD

## 2022-10-07 ENCOUNTER — OFFICE VISIT (OUTPATIENT)
Dept: FAMILY MEDICINE CLINIC | Facility: CLINIC | Age: 67
End: 2022-10-07
Payer: MEDICARE

## 2022-10-07 VITALS
SYSTOLIC BLOOD PRESSURE: 120 MMHG | HEIGHT: 62 IN | DIASTOLIC BLOOD PRESSURE: 68 MMHG | BODY MASS INDEX: 29.44 KG/M2 | HEART RATE: 75 BPM | RESPIRATION RATE: 18 BRPM | OXYGEN SATURATION: 96 % | TEMPERATURE: 98 F | WEIGHT: 160 LBS

## 2022-10-07 VITALS
TEMPERATURE: 98 F | DIASTOLIC BLOOD PRESSURE: 68 MMHG | WEIGHT: 160.2 LBS | BODY MASS INDEX: 29.48 KG/M2 | OXYGEN SATURATION: 96 % | HEART RATE: 75 BPM | HEIGHT: 62 IN | SYSTOLIC BLOOD PRESSURE: 120 MMHG

## 2022-10-07 DIAGNOSIS — F41.9 ANXIETY DISORDER, UNSPECIFIED TYPE: ICD-10-CM

## 2022-10-07 DIAGNOSIS — Z00.00 MEDICARE ANNUAL WELLNESS VISIT, SUBSEQUENT: Primary | ICD-10-CM

## 2022-10-07 DIAGNOSIS — N39.0 RECURRENT UTI: ICD-10-CM

## 2022-10-07 DIAGNOSIS — Z23 VACCINE FOR STREPTOCOCCUS PNEUMONIAE AND INFLUENZA: ICD-10-CM

## 2022-10-07 DIAGNOSIS — R31.29 MICROSCOPIC HEMATURIA: ICD-10-CM

## 2022-10-07 DIAGNOSIS — Z78.0 ASYMPTOMATIC MENOPAUSAL STATE: ICD-10-CM

## 2022-10-07 DIAGNOSIS — N95.2 VAGINAL ATROPHY: ICD-10-CM

## 2022-10-07 DIAGNOSIS — Z71.89 ACP (ADVANCE CARE PLANNING): ICD-10-CM

## 2022-10-07 DIAGNOSIS — I10 ESSENTIAL HYPERTENSION, BENIGN: Primary | ICD-10-CM

## 2022-10-07 DIAGNOSIS — E78.00 PURE HYPERCHOLESTEROLEMIA: ICD-10-CM

## 2022-10-07 DIAGNOSIS — F51.01 PRIMARY INSOMNIA: ICD-10-CM

## 2022-10-07 DIAGNOSIS — E66.3 OVERWEIGHT: ICD-10-CM

## 2022-10-07 PROCEDURE — G0439 PPPS, SUBSEQ VISIT: HCPCS | Performed by: PHYSICIAN ASSISTANT

## 2022-10-07 PROCEDURE — 90694 VACC AIIV4 NO PRSRV 0.5ML IM: CPT | Performed by: PHYSICIAN ASSISTANT

## 2022-10-07 PROCEDURE — G8417 CALC BMI ABV UP PARAM F/U: HCPCS | Performed by: FAMILY MEDICINE

## 2022-10-07 PROCEDURE — 1123F ACP DISCUSS/DSCN MKR DOCD: CPT | Performed by: PHYSICIAN ASSISTANT

## 2022-10-07 PROCEDURE — G8427 DOCREV CUR MEDS BY ELIG CLIN: HCPCS | Performed by: FAMILY MEDICINE

## 2022-10-07 PROCEDURE — G8484 FLU IMMUNIZE NO ADMIN: HCPCS | Performed by: PHYSICIAN ASSISTANT

## 2022-10-07 PROCEDURE — G8484 FLU IMMUNIZE NO ADMIN: HCPCS | Performed by: FAMILY MEDICINE

## 2022-10-07 PROCEDURE — 3017F COLORECTAL CA SCREEN DOC REV: CPT | Performed by: PHYSICIAN ASSISTANT

## 2022-10-07 PROCEDURE — 1090F PRES/ABSN URINE INCON ASSESS: CPT | Performed by: FAMILY MEDICINE

## 2022-10-07 PROCEDURE — 99214 OFFICE O/P EST MOD 30 MIN: CPT | Performed by: FAMILY MEDICINE

## 2022-10-07 PROCEDURE — G8399 PT W/DXA RESULTS DOCUMENT: HCPCS | Performed by: FAMILY MEDICINE

## 2022-10-07 PROCEDURE — 3017F COLORECTAL CA SCREEN DOC REV: CPT | Performed by: FAMILY MEDICINE

## 2022-10-07 PROCEDURE — 1036F TOBACCO NON-USER: CPT | Performed by: FAMILY MEDICINE

## 2022-10-07 PROCEDURE — 1123F ACP DISCUSS/DSCN MKR DOCD: CPT | Performed by: FAMILY MEDICINE

## 2022-10-07 PROCEDURE — G0008 ADMIN INFLUENZA VIRUS VAC: HCPCS | Performed by: PHYSICIAN ASSISTANT

## 2022-10-07 RX ORDER — ROSUVASTATIN CALCIUM 10 MG/1
10 TABLET, COATED ORAL NIGHTLY
Qty: 90 TABLET | Refills: 3 | Status: SHIPPED | OUTPATIENT
Start: 2022-10-07

## 2022-10-07 RX ORDER — TEMAZEPAM 15 MG/1
30 CAPSULE ORAL NIGHTLY PRN
Qty: 30 CAPSULE | Refills: 5 | Status: SHIPPED | OUTPATIENT
Start: 2022-10-07 | End: 2022-11-06

## 2022-10-07 RX ORDER — LOSARTAN POTASSIUM 100 MG/1
100 TABLET ORAL DAILY
Qty: 90 TABLET | Refills: 3 | Status: SHIPPED | OUTPATIENT
Start: 2022-10-07

## 2022-10-07 RX ORDER — CONJUGATED ESTROGENS 0.62 MG/G
1.5 CREAM VAGINAL
Qty: 42.5 G | Refills: 3 | Status: SHIPPED | OUTPATIENT
Start: 2022-10-07

## 2022-10-07 RX ORDER — TEMAZEPAM 15 MG/1
30 CAPSULE ORAL NIGHTLY PRN
COMMUNITY
End: 2022-10-07 | Stop reason: SDUPTHER

## 2022-10-07 ASSESSMENT — ENCOUNTER SYMPTOMS
SHORTNESS OF BREATH: 0
VOMITING: 0
NAUSEA: 0
DIARRHEA: 0
COUGH: 0

## 2022-10-07 ASSESSMENT — LIFESTYLE VARIABLES
HOW OFTEN DO YOU HAVE A DRINK CONTAINING ALCOHOL: 2-4 TIMES A MONTH
HOW MANY STANDARD DRINKS CONTAINING ALCOHOL DO YOU HAVE ON A TYPICAL DAY: 3 OR 4

## 2022-10-07 NOTE — PROGRESS NOTES
[address] [place] [address]  Phone [phone]  Fax [phone]  Vini THOMPSON    Patient: Sierra Rodríguez  YOB: 1955  Patient Age 78 y.o. Patient sex: female  Medical Record:  [id number]  Visit Date:10/7/2022   Author:  Florence Pisano Note     Chief complaint  Sierra Rodríguez  is a 78 y.o. female who was seen on 10/07/22  10:33 AM  for the following reasons:    No chief complaint on file. Current Medical problems addressed    , Is a 60-year-old female who presents today for follow-up she has an ongoing history of high blood pressure high cholesterol anxiety insomnia HRT. She previously saw Dr B. Fabio Severin in the practice and comes in today for follow-up with me. Her last labs were done 7/8/2022 at that time her lipid panel total was 117, triglycerides were 85, HDL was 45, and LDL was 55. CMP at that time noted kidney function stable liver functions are stable. Hypertension patient is currently treated with losartan 100 and blood pressure today is***. Hyperlipidemia patient is treated with Crestor 10 mg and denies any side effects of muscle aches medications. Patient is treated with Premarin vaginal cream 1/2 g vaginally every 7 days for HRT and revealed her screening she has had a mammogram done on 9/7/2022 that was normal and last Pap was done on 7/20/2021. At that time it was negative for any intraepithelial lesions or malignancies. She is due for her next Pap in 7/21/2024. She had E coli   ASSESSMENT AND PLAN  No diagnosis found. There are no diagnoses linked to this encounter. Plan includes the following:  ***  No follow-up provider specified. No orders of the defined types were placed in this encounter. Past Medical History:   Allergies:No Known Allergies    Current Medications:   Medications marked \"taking\" at this time:  Current Outpatient Medications   Medication Sig Dispense Refill    losartan (COZAAR) 100 MG tablet Take 1 tablet by mouth daily TAKE 1 TABLET BY MOUTH EVERY DAY 90 tablet 3    rosuvastatin (CRESTOR) 10 MG tablet Take 1 tablet by mouth at bedtime 90 tablet 3    conjugated estrogens (PREMARIN) 0.625 MG/GM vaginal cream Place 1.5 g vaginally every 7 days 42.5 g 3     No current facility-administered medications for this visit. Current Problem List:   Patient Active Problem List   Diagnosis    Pure hypercholesterolemia    Class 1 obesity due to excess calories with serious comorbidity and body mass index (BMI) of 30.0 to 30.9 in adult    Anxiety    Essential hypertension, benign    Carpal tunnel syndrome    Primary insomnia       Social History:   Social History     Tobacco Use    Smoking status: Never    Smokeless tobacco: Never   Substance Use Topics    Alcohol use: Yes     Alcohol/week: 1.0 standard drink       Family History:   Family History   Problem Relation Age of Onset    Coronary Art Dis Father         onset 52's, lived to be 80 with dementia    Dementia Paternal Grandfather     Arrhythmia Father     Dementia Father     Pacemaker Father     Breast Cancer Mother 52    Hypertension Mother     Cancer Mother        Surgical History:  Past Surgical History:   Procedure Laterality Date    CARPAL TUNNEL RELEASE Bilateral 8/2010    Carpal tunnel surgery. CATARACT REMOVAL  8/06    Cataract surgery        ROS  Review of Systems    There were no vitals taken for this visit. There is no height or weight on file to calculate BMI. Physical Exam    Physical Exam     I have reviewed the patient's past medical history, social history and family history and vitals. We have discussed treatment plan and follow up and given patient instructions. Patient's questions are answered and we will follow up as indicated. No follow-ups on file. Zora Rhodes PA-C  10:33 AM  10/7/2022

## 2022-10-07 NOTE — PATIENT INSTRUCTIONS
Personalized Preventive Plan for [place] - 10/7/2022  Medicare offers a range of preventive health benefits. Some of the tests and screenings are paid in full while other may be subject to a deductible, co-insurance, and/or copay. Some of these benefits include a comprehensive review of your medical history including lifestyle, illnesses that may run in your family, and various assessments and screenings as appropriate. After reviewing your medical record and screening and assessments performed today your provider may have ordered immunizations, labs, imaging, and/or referrals for you. A list of these orders (if applicable) as well as your Preventive Care list are included within your After Visit Summary for your review. Other Preventive Recommendations:    A preventive eye exam performed by an eye specialist is recommended every 1-2 years to screen for glaucoma; cataracts, macular degeneration, and other eye disorders. A preventive dental visit is recommended every 6 months. Try to get at least 150 minutes of exercise per week or 10,000 steps per day on a pedometer . Order or download the FREE \"Exercise & Physical Activity: Your Everyday Guide\" from [employer]. Call 8-661.167.4940 or search [employer] online. You need 1811-5498 mg of calcium and 0313-9330 IU of vitamin D per day. It is possible to meet your calcium requirement with diet alone, but a vitamin D supplement is usually necessary to meet this goal.  When exposed to the sun, use a sunscreen that protects against both UVA and UVB radiation with an SPF of 30 or greater. Reapply every 2 to 3 hours or after sweating, drying off with a towel, or swimming. Always wear a seat belt when traveling in a car. Always wear a helmet when riding a bicycle or motorcycle.

## 2022-10-07 NOTE — PROGRESS NOTES
Medicare Annual Wellness Visit    Chidi Reis is here for Medicare AWV    Assessment & Plan   Medicare annual wellness visit, subsequent  Vaccine for streptococcus pneumoniae and influenza  -     Influenza, FLUAD, (age 72 y+), IM, PF, 0.5 mL  Asymptomatic menopausal state    Recommendations for Preventive Services Due: see orders and patient instructions/AVS.  Recommended screening schedule for the next 5-10 years is provided to the patient in written form: see Patient Instructions/AVS.     Return for Medicare Annual Wellness Visit in 1 year. Subjective       Patient's complete Health Risk Assessment and screening values have been reviewed and are found in Flowsheets. The following problems were reviewed today and where indicated follow up appointments were made and/or referrals ordered.     Positive Risk Factor Screenings with Interventions:             General Health and ACP:  General  In general, how would you say your health is?: Very Good  In the past 7 days, have you experienced any of the following: New or Increased Pain, New or Increased Fatigue, Loneliness, Social Isolation, Stress or Anger?: No  Do you get the social and emotional support that you need?: Yes  Do you have a Living Will?: (!) No    Advance Directives       Power of  Living Will ACP-Advance Directive ACP-Power of     Not on File Not on File Not on File Not on File          General Health Risk Interventions:  No Living Will: Advance Care Planning addressed with patient today and Patient referred to [place] Habits/Nutrition:  Physical Activity: Insufficiently Active    Days of Exercise per Week: 1 day    Minutes of Exercise per Session: 60 min     Have you lost any weight without trying in the past 3 months?: (!) Yes (after covid--but has gained it back)  Body mass index: (!) 29.3  Have you seen the dentist within the past year?: Yes  Health Habits/Nutrition Interventions:  Dental exam overdue: patient encouraged to make appointment with his/her dentist             Objective   Vitals:    10/07/22 1037   BP: 120/68   Pulse: 75   Temp: 98 °F (36.7 °C)   SpO2: 96%   Weight: 160 lb 3.2 oz (72.7 kg)   Height: 5' 2\" (1.575 m)      Body mass index is 29.3 kg/m². Pulmonary/Chest: clear to auscultation bilaterally- no wheezes, rales or rhonchi, normal air movement, no respiratory distress  Cardiovascular: normal rate, normal S1 and S2, no gallops, and intact distal pulses       No Known Allergies  Prior to Visit Medications    Medication Sig Taking? Authorizing Provider   temazepam (RESTORIL) 15 MG capsule Take 30 mg by mouth nightly as needed for Sleep.  2 poqhs Yes Historical Provider, MD   losartan (COZAAR) 100 MG tablet Take 1 tablet by mouth daily TAKE 1 TABLET BY MOUTH EVERY DAY Yes Gwen Diaz MD   rosuvastatin (CRESTOR) 10 MG tablet Take 1 tablet by mouth at bedtime Yes Gwen Diaz MD   conjugated estrogens (PREMARIN) 0.625 MG/GM vaginal cream Place 1.5 g vaginally every 7 days Yes Gwen Diaz MD       [facility] (Including outside providers/suppliers regularly involved in providing care):   Patient Care Team:  Gwen Diaz MD as PCP - Nasir Lyons MD as PCP - [facility] Empaneled Provider     Reviewed and updated this visit:  Tobacco  Allergies  Meds  Problems  Med Hx  Surg Hx  Soc Hx  Fam Hx

## 2022-10-07 NOTE — PROGRESS NOTES
Jd Sanchez (:  1955) is a 78 y.o. female,Established patient, here for evaluation of the following chief complaint(s):  Follow-up (3 month), Hypertension (Well-controlled), and Cholesterol Problem (Repeat fasting labs)         ASSESSMENT/PLAN:  1. Essential hypertension, benign- well-controlled  -     Comprehensive Metabolic Panel; Future  -     losartan (COZAAR) 100 MG tablet; Take 1 tablet by mouth daily TAKE 1 TABLET BY MOUTH EVERY DAY, Disp-90 tablet, R-3Normal  2. Pure hypercholesterolemia- repeat fastng labs  -     Comprehensive Metabolic Panel; Future  -     Lipid Panel; Future  -     rosuvastatin (CRESTOR) 10 MG tablet; Take 1 tablet by mouth at bedtime, Disp-90 tablet, R-3Normal  3. Anxiety disorder, unspecified type- stable  4. Primary insomnia  Assessment & Plan:   Borderline controlled, continue current medications  Orders:  -     temazepam (RESTORIL) 15 MG capsule; Take 2 capsules by mouth nightly as needed for Sleep for up to 30 days. 2 poqhs, Disp-30 capsule, R-5Normal  -     no daytime sedation  5. Overweight  Assessment & Plan:   Uncontrolled, lifestyle modifications recommended  BMI handout  6. Vaginal atrophy- refill med  -     estrogens conjugated (PREMARIN) 0.625 MG/GM CREA vaginal cream; Place 1.5 g vaginally every 7 days, Vaginal, EVERY 7 DAYS Starting Fri 10/7/2022, Disp-42.5 g, R-3, Normal  7. Microscopic hematuria  -     AMB POC URINALYSIS DIP STICK MANUAL W/ MICRO BSSC= Tr bld, tr leuk, microscopic completely normal  8. Recurrent UTI- resolved  -     AMB POC URINALYSIS DIP STICK MANUAL W/ MICRO BSSC- as above,      Return in about 4 months (around 2023) for fasting chronic care. Subjective   SUBJECTIVE/OBJECTIVE:  Hypertension  This is a chronic problem. The current episode started more than 1 year ago. The problem is unchanged. The problem is controlled. Pertinent negatives include no chest pain or shortness of breath.  Risk factors for coronary artery disease include dyslipidemia and post-menopausal state. Past treatments include angiotensin blockers. The current treatment provides significant improvement. There are no compliance problems. Hyperlipidemia  This is a chronic problem. The current episode started more than 1 year ago. The problem is controlled. Recent lipid tests were reviewed and are variable. Pertinent negatives include no chest pain or shortness of breath. Review of Systems   Constitutional:  Negative for chills and fatigue. Respiratory:  Negative for cough and shortness of breath. Cardiovascular:  Negative for chest pain and leg swelling. Gastrointestinal:  Negative for diarrhea, nausea and vomiting. Objective   Physical Exam  Vitals and nursing note reviewed. Constitutional:       General: She is not in acute distress. Appearance: Normal appearance. HENT:      Head: Normocephalic and atraumatic. Nose: Nose normal.      Mouth/Throat:      Pharynx: Oropharynx is clear. Eyes:      Extraocular Movements: Extraocular movements intact. Conjunctiva/sclera: Conjunctivae normal.   Cardiovascular:      Rate and Rhythm: Normal rate and regular rhythm. Pulses: Normal pulses. Heart sounds: Normal heart sounds. Pulmonary:      Effort: Pulmonary effort is normal.      Breath sounds: Normal breath sounds. Musculoskeletal:         General: No swelling or tenderness. Normal range of motion. Cervical back: Normal range of motion and neck supple. Skin:     General: Skin is warm and dry. Neurological:      General: No focal deficit present. Mental Status: She is alert. Mental status is at baseline. Psychiatric:         Mood and Affect: Mood normal.         Behavior: Behavior normal.              An electronic signature was used to authenticate this note.     --Alo Le MD

## 2022-10-07 NOTE — ACP (ADVANCE CARE PLANNING)
The patient has no ACP documents on file. Patient was advised of the importance of a ACP and Education material to create one was given. No further questions at this time. Requested patient bring copy to scan into chart once completed.

## 2022-10-10 ENCOUNTER — CLINICAL DOCUMENTATION (OUTPATIENT)
Dept: CASE MANAGEMENT | Age: 67
End: 2022-10-10

## 2022-10-10 ENCOUNTER — NURSE ONLY (OUTPATIENT)
Dept: FAMILY MEDICINE CLINIC | Facility: CLINIC | Age: 67
End: 2022-10-10

## 2022-10-10 ENCOUNTER — TELEPHONE (OUTPATIENT)
Dept: FAMILY MEDICINE CLINIC | Facility: CLINIC | Age: 67
End: 2022-10-10

## 2022-10-10 DIAGNOSIS — E78.00 PURE HYPERCHOLESTEROLEMIA: ICD-10-CM

## 2022-10-10 DIAGNOSIS — I10 ESSENTIAL HYPERTENSION, BENIGN: ICD-10-CM

## 2022-10-10 LAB
ALBUMIN SERPL-MCNC: 4 G/DL (ref 3.2–4.6)
ALBUMIN/GLOB SERPL: 1.3 {RATIO} (ref 1.2–3.5)
ALP SERPL-CCNC: 66 U/L (ref 50–136)
ALT SERPL-CCNC: 51 U/L (ref 12–65)
ANION GAP SERPL CALC-SCNC: 4 MMOL/L (ref 4–13)
AST SERPL-CCNC: 31 U/L (ref 15–37)
BILIRUB SERPL-MCNC: 0.5 MG/DL (ref 0.2–1.1)
BUN SERPL-MCNC: 18 MG/DL (ref 8–23)
CALCIUM SERPL-MCNC: 9.3 MG/DL (ref 8.3–10.4)
CHLORIDE SERPL-SCNC: 112 MMOL/L (ref 101–110)
CHOLEST SERPL-MCNC: 149 MG/DL
CO2 SERPL-SCNC: 23 MMOL/L (ref 21–32)
CREAT SERPL-MCNC: 0.9 MG/DL (ref 0.6–1)
GLOBULIN SER CALC-MCNC: 3.1 G/DL (ref 2.3–3.5)
GLUCOSE SERPL-MCNC: 93 MG/DL (ref 65–100)
HDLC SERPL-MCNC: 46 MG/DL (ref 40–60)
HDLC SERPL: 3.2 {RATIO}
LDLC SERPL CALC-MCNC: 85.4 MG/DL
POTASSIUM SERPL-SCNC: 4 MMOL/L (ref 3.5–5.1)
PROT SERPL-MCNC: 7.1 G/DL (ref 6.3–8.2)
SODIUM SERPL-SCNC: 139 MMOL/L (ref 136–145)
TRIGL SERPL-MCNC: 88 MG/DL (ref 35–150)
VLDLC SERPL CALC-MCNC: 17.6 MG/DL (ref 6–23)

## 2022-10-10 NOTE — ACP (ADVANCE CARE PLANNING)
Advance Care Planning   Ambulatory ACP Specialist Patient Outreach    Date:  10/10/2022  ACP Specialist:  Sony Pope LPN    Outreach call to patient in follow-up to ACP Specialist referral from: Snow Mittal MD    [x] PCP  [] Provider   [] Ambulatory Care Management [] Other for Reason:    [x] Advance Directive Assistance  [] Code Status Discussion  [] Complete Portable DNR Order  [] Discuss Goals of Care  [] Complete POST/MOST  [] Early ACP Decision-Making  [] Other    Date Referral Received: 10/7/22    Today's Outreach:  [x] First   [] Second  [] Third                               Third outreach made by []  phone  [] email []   Help/Systemshart     Intervention:  [x] Spoke with Patient  [] Left VM requesting return call      Outcome:   LPN spoke with the pt who wishes to move forward in having an ACP conversation with an ACP specialist. Pt is alert and oriented x4. Appointment scheduled for 11/2/22 at 9 am. ACP information has been e-mailed to the pt for review prior to the appointment     Next Step:   [x] ACP scheduled conversation  [] Outreach again in one week               [x] Email / Mail [address]  [] Email / Mail Advance Directive            [] Close Referral. Routing closure to referring provider/staff and to ACP Specialist . [] Closure Letter mailed to Patient with Invitation to Contact ACP Specialist if/when ready.     Thank you for this referral.
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
## 2022-11-02 ENCOUNTER — CLINICAL DOCUMENTATION (OUTPATIENT)
Dept: CARE COORDINATION | Facility: CLINIC | Age: 67
End: 2022-11-02

## 2022-11-02 ENCOUNTER — TELEPHONE (OUTPATIENT)
Dept: FAMILY MEDICINE CLINIC | Facility: CLINIC | Age: 67
End: 2022-11-02

## 2022-11-02 NOTE — Clinical Note
Thank you for this referral. Conversation complete and awaiting document return. Will move to close referral upon receipt.

## 2022-11-03 NOTE — TELEPHONE ENCOUNTER
Call pharmacy to confirm because [facility] (reported by pharmacy)says 270 were filled - suspect that was reported wrong, but must be sure

## 2022-11-03 NOTE — ACP (ADVANCE CARE PLANNING)
Advance Care Planning     Advance Care Planning Clinical Specialist  Conversation Note      Date of ACP Conversation: 11/2/2022    Conversation Conducted with: Patient with Decision Making Capacity    ACP Clinical Specialist: [id number] Decision Maker:     Current Designated Healthcare Decision Maker:     Primary Decision Maker: Anish 207 - [phone]    Secondary Decision Maker: Sarabjit Bruce Brother/Sister - [phone]        Care Preferences    Hospitalization: \"If your health worsens and it becomes clear that your chance of recovery is unlikely, what would your preference be regarding hospitalization? \"    Choice:  [] The patient wants hospitalization. [x] The patient prefers comfort-focused treatment without hospitalization. Ventilation: \"If you were in your present state of health and suddenly became very ill and were unable to breathe on your own, what would your preference be about the use of a ventilator (breathing machine) if it were available to you? \"      If the patient would desire the use of ventilator (breathing machine), answer \"yes\". If not, \"no\":  Patient would not want prolonged ventilation    \"If your health worsens and it becomes clear that your chance of recovery is unlikely, what would your preference be about the use of a ventilator (breathing machine) if it were available to you? \"     Would the patient desire the use of ventilator (breathing machine)?: No      Resuscitation  \"CPR works best to restart the heart when there is a sudden event, like a heart attack, in someone who is otherwise healthy. Unfortunately, CPR does not typically restart the heart for people who have serious health conditions or who are very sick. \"    \"In the event your heart stopped as a result of an underlying serious health condition, would you want attempts to be made to restart your heart (answer \"yes\" for attempt to resuscitate) or would you prefer a natural death (answer \"no\" for do not attempt to resuscitate)? \" no       [x] Yes   [] No   Educated Patient / Richard Dexter regarding differences between Advance Directives and portable DNR orders. Length of ACP Conversation in minutes:  76    Conversation Outcomes:  [x] ACP discussion completed  [] Existing advance directive reviewed with patient; no changes to patient's previously recorded wishes  [x] New Advance Directive completed  [] Portable Do Not Rescitate prepared for Provider review and signature  [] POLST/POST/MOLST/MOST prepared for Provider review and signature      Follow-up plan:    [] Schedule follow-up conversation to continue planning  [] Referred individual to Provider for additional questions/concerns   [x] Advised patient/agent/surrogate to review completed ACP document and update if needed with changes in condition, patient preferences or care setting    [x] This note routed to one or more involved healthcare providers      ACP conversation completed, all questions answered, and assisted patient in completing document. Will sign with appropriate witnesses and return to PCP office. Will move to close referral once document returned.

## 2022-11-10 ENCOUNTER — CLINICAL DOCUMENTATION (OUTPATIENT)
Dept: CARE COORDINATION | Facility: CLINIC | Age: 67
End: 2022-11-10

## 2023-01-11 DIAGNOSIS — F51.01 PRIMARY INSOMNIA: ICD-10-CM

## 2023-01-16 RX ORDER — TEMAZEPAM 15 MG/1
30 CAPSULE ORAL NIGHTLY PRN
Qty: 30 CAPSULE | Refills: 5 | Status: SHIPPED | OUTPATIENT
Start: 2023-01-16 | End: 2023-02-15

## 2023-02-09 ENCOUNTER — OFFICE VISIT (OUTPATIENT)
Dept: FAMILY MEDICINE CLINIC | Facility: CLINIC | Age: 68
End: 2023-02-09
Payer: MEDICARE

## 2023-02-09 VITALS
BODY MASS INDEX: 30.36 KG/M2 | WEIGHT: 165 LBS | HEART RATE: 73 BPM | RESPIRATION RATE: 18 BRPM | SYSTOLIC BLOOD PRESSURE: 118 MMHG | DIASTOLIC BLOOD PRESSURE: 78 MMHG | TEMPERATURE: 97 F | HEIGHT: 62 IN | OXYGEN SATURATION: 97 %

## 2023-02-09 DIAGNOSIS — I70.0 ATHEROSCLEROSIS OF AORTA (HCC): ICD-10-CM

## 2023-02-09 DIAGNOSIS — I10 ESSENTIAL HYPERTENSION, BENIGN: Primary | ICD-10-CM

## 2023-02-09 DIAGNOSIS — E78.00 PURE HYPERCHOLESTEROLEMIA: ICD-10-CM

## 2023-02-09 DIAGNOSIS — F51.01 PRIMARY INSOMNIA: ICD-10-CM

## 2023-02-09 DIAGNOSIS — E66.09 CLASS 1 OBESITY DUE TO EXCESS CALORIES WITH SERIOUS COMORBIDITY AND BODY MASS INDEX (BMI) OF 30.0 TO 30.9 IN ADULT: ICD-10-CM

## 2023-02-09 DIAGNOSIS — F13.99 SEDATIVE, HYPNOTIC OR ANXIOLYTIC USE, UNSPECIFIED WITH UNSPECIFIED SEDATIVE, HYPNOTIC OR ANXIOLYTIC-INDUCED DISORDER (HCC): ICD-10-CM

## 2023-02-09 LAB
BASOPHILS # BLD: 0.1 K/UL (ref 0–0.2)
BASOPHILS NFR BLD: 1 % (ref 0–2)
DIFFERENTIAL METHOD BLD: NORMAL
EOSINOPHIL # BLD: 0.2 K/UL (ref 0–0.8)
EOSINOPHIL NFR BLD: 3 % (ref 0.5–7.8)
ERYTHROCYTE [DISTWIDTH] IN BLOOD BY AUTOMATED COUNT: 12.1 % (ref 11.9–14.6)
HCT VFR BLD AUTO: 40.2 % (ref 35.8–46.3)
HGB BLD-MCNC: 13.8 G/DL (ref 11.7–15.4)
IMM GRANULOCYTES # BLD AUTO: 0 K/UL (ref 0–0.5)
IMM GRANULOCYTES NFR BLD AUTO: 0 % (ref 0–5)
LYMPHOCYTES # BLD: 2.4 K/UL (ref 0.5–4.6)
LYMPHOCYTES NFR BLD: 39 % (ref 13–44)
MCH RBC QN AUTO: 32.4 PG (ref 26.1–32.9)
MCHC RBC AUTO-ENTMCNC: 34.3 G/DL (ref 31.4–35)
MCV RBC AUTO: 94.4 FL (ref 82–102)
MONOCYTES # BLD: 0.5 K/UL (ref 0.1–1.3)
MONOCYTES NFR BLD: 8 % (ref 4–12)
NEUTS SEG # BLD: 3.1 K/UL (ref 1.7–8.2)
NEUTS SEG NFR BLD: 49 % (ref 43–78)
NRBC # BLD: 0 K/UL (ref 0–0.2)
PLATELET # BLD AUTO: 227 K/UL (ref 150–450)
PMV BLD AUTO: 10.3 FL (ref 9.4–12.3)
RBC # BLD AUTO: 4.26 M/UL (ref 4.05–5.2)
WBC # BLD AUTO: 6.3 K/UL (ref 4.3–11.1)

## 2023-02-09 PROCEDURE — 1090F PRES/ABSN URINE INCON ASSESS: CPT | Performed by: FAMILY MEDICINE

## 2023-02-09 PROCEDURE — G8399 PT W/DXA RESULTS DOCUMENT: HCPCS | Performed by: FAMILY MEDICINE

## 2023-02-09 PROCEDURE — 3017F COLORECTAL CA SCREEN DOC REV: CPT | Performed by: FAMILY MEDICINE

## 2023-02-09 PROCEDURE — 3074F SYST BP LT 130 MM HG: CPT | Performed by: FAMILY MEDICINE

## 2023-02-09 PROCEDURE — G8484 FLU IMMUNIZE NO ADMIN: HCPCS | Performed by: FAMILY MEDICINE

## 2023-02-09 PROCEDURE — 1036F TOBACCO NON-USER: CPT | Performed by: FAMILY MEDICINE

## 2023-02-09 PROCEDURE — G8427 DOCREV CUR MEDS BY ELIG CLIN: HCPCS | Performed by: FAMILY MEDICINE

## 2023-02-09 PROCEDURE — 3078F DIAST BP <80 MM HG: CPT | Performed by: FAMILY MEDICINE

## 2023-02-09 PROCEDURE — G8417 CALC BMI ABV UP PARAM F/U: HCPCS | Performed by: FAMILY MEDICINE

## 2023-02-09 PROCEDURE — 1123F ACP DISCUSS/DSCN MKR DOCD: CPT | Performed by: FAMILY MEDICINE

## 2023-02-09 PROCEDURE — 99214 OFFICE O/P EST MOD 30 MIN: CPT | Performed by: FAMILY MEDICINE

## 2023-02-09 RX ORDER — TEMAZEPAM 15 MG/1
30 CAPSULE ORAL NIGHTLY PRN
Qty: 180 CAPSULE | Refills: 1 | Status: SHIPPED | OUTPATIENT
Start: 2023-03-03 | End: 2023-06-01

## 2023-02-09 SDOH — ECONOMIC STABILITY: INCOME INSECURITY: HOW HARD IS IT FOR YOU TO PAY FOR THE VERY BASICS LIKE FOOD, HOUSING, MEDICAL CARE, AND HEATING?: NOT HARD AT ALL

## 2023-02-09 SDOH — ECONOMIC STABILITY: FOOD INSECURITY: WITHIN THE PAST 12 MONTHS, THE FOOD YOU BOUGHT JUST DIDN'T LAST AND YOU DIDN'T HAVE MONEY TO GET MORE.: NEVER TRUE

## 2023-02-09 SDOH — ECONOMIC STABILITY: FOOD INSECURITY: WITHIN THE PAST 12 MONTHS, YOU WORRIED THAT YOUR FOOD WOULD RUN OUT BEFORE YOU GOT MONEY TO BUY MORE.: NEVER TRUE

## 2023-02-09 SDOH — ECONOMIC STABILITY: HOUSING INSECURITY
IN THE LAST 12 MONTHS, WAS THERE A TIME WHEN YOU DID NOT HAVE A STEADY PLACE TO SLEEP OR SLEPT IN A SHELTER (INCLUDING NOW)?: NO

## 2023-02-09 ASSESSMENT — ENCOUNTER SYMPTOMS
VOMITING: 0
SHORTNESS OF BREATH: 0
DIARRHEA: 0
NAUSEA: 0
COUGH: 0

## 2023-02-09 ASSESSMENT — PATIENT HEALTH QUESTIONNAIRE - PHQ9
2. FEELING DOWN, DEPRESSED OR HOPELESS: 0
1. LITTLE INTEREST OR PLEASURE IN DOING THINGS: 0
SUM OF ALL RESPONSES TO PHQ QUESTIONS 1-9: 0
SUM OF ALL RESPONSES TO PHQ9 QUESTIONS 1 & 2: 0
SUM OF ALL RESPONSES TO PHQ QUESTIONS 1-9: 0

## 2023-02-09 NOTE — PROGRESS NOTES
Jerson Plascencia (:  1955) is a 78 y.o. female,Established patient, here for evaluation of the following chief complaint(s):  Follow-up (Chronic care follow up. Fasting. ), Hypertension, Cholesterol Problem, and Medication Refill (Needs 90 day supply of Temazepam sent in. )         ASSESSMENT/PLAN:  1. Essential hypertension, benign- well-controlled  -     CBC with Auto Differential; Future  -     Comprehensive Metabolic Panel; Future  2. Pure hypercholesterolemia- repeat fasting labs  -     Comprehensive Metabolic Panel; Future  -     Lipid Panel; Future  3. Atherosclerosis of aorta ([facility] [address]- stable, keep ldl down  4. Primary insomnia  -    RF temazepam (RESTORIL) 15 MG capsule; Take 2 capsules by mouth nightly as needed for Sleep for up to 90 days. 2 poqhs Max Daily Amount: 30 mg, Disp-180 capsule, R-1Normal  5. Sedative, hypnotic or anxiolytic use, unspecified with unspecified sedative, hypnotic or anxiolytic-induced disorder ([facility] [address]- stable , no daytime sedation  6. Class 1 obesity due to excess calories with serious comorbidity and body mass index (BMI) of 30.0 to 30.9 in adult  Assessment & Plan:   Uncontrolled, lifestyle modifications recommended               BMI handout    Return in about 3 months (around 2023) for fasting chronic care. Subjective   SUBJECTIVE/OBJECTIVE:  Hypertension  This is a chronic problem. The current episode started more than 1 year ago. The problem is unchanged. The problem is controlled. Pertinent negatives include no chest pain or shortness of breath. Past treatments include angiotensin blockers. The current treatment provides significant improvement. Medication Refill  Pertinent negatives include no chest pain, chills, coughing, fatigue, nausea or vomiting. Hyperlipidemia  This is a chronic problem. The current episode started more than 1 year ago. The problem is controlled. Recent lipid tests were reviewed and are variable.  Pertinent negatives include no chest pain or shortness of breath. Review of Systems   Constitutional:  Negative for chills and fatigue. Respiratory:  Negative for cough and shortness of breath. Cardiovascular:  Negative for chest pain and leg swelling. Gastrointestinal:  Negative for diarrhea, nausea and vomiting. Objective   Physical Exam  Vitals and nursing note reviewed. Constitutional:       General: She is not in acute distress. Appearance: Normal appearance. She is obese. HENT:      Head: Normocephalic and atraumatic. Nose: Nose normal.      Mouth/Throat:      Pharynx: Oropharynx is clear. Eyes:      Extraocular Movements: Extraocular movements intact. Conjunctiva/sclera: Conjunctivae normal.   Cardiovascular:      Rate and Rhythm: Normal rate and regular rhythm. Pulses: Normal pulses. Heart sounds: Normal heart sounds. Pulmonary:      Effort: Pulmonary effort is normal.      Breath sounds: Normal breath sounds. Abdominal:      General: Bowel sounds are normal.      Palpations: Abdomen is soft. Musculoskeletal:         General: No swelling or tenderness. Normal range of motion. Cervical back: Normal range of motion and neck supple. Skin:     General: Skin is warm and dry. Neurological:      General: No focal deficit present. Mental Status: She is alert. Mental status is at baseline. Psychiatric:         Mood and Affect: Mood normal.         Behavior: Behavior normal.              An electronic signature was used to authenticate this note.     --Angelica Irby MD

## 2023-02-10 LAB
ALBUMIN SERPL-MCNC: 3.8 G/DL (ref 3.2–4.6)
ALBUMIN/GLOB SERPL: 1.1 (ref 0.4–1.6)
ALP SERPL-CCNC: 59 U/L (ref 50–136)
ALT SERPL-CCNC: 31 U/L (ref 12–65)
ANION GAP SERPL CALC-SCNC: 11 MMOL/L (ref 2–11)
AST SERPL-CCNC: 20 U/L (ref 15–37)
BILIRUB SERPL-MCNC: 0.5 MG/DL (ref 0.2–1.1)
BUN SERPL-MCNC: 16 MG/DL (ref 8–23)
CALCIUM SERPL-MCNC: 9.2 MG/DL (ref 8.3–10.4)
CHLORIDE SERPL-SCNC: 112 MMOL/L (ref 101–110)
CHOLEST SERPL-MCNC: 140 MG/DL
CO2 SERPL-SCNC: 21 MMOL/L (ref 21–32)
CREAT SERPL-MCNC: 0.9 MG/DL (ref 0.6–1)
GLOBULIN SER CALC-MCNC: 3.4 G/DL (ref 2.8–4.5)
GLUCOSE SERPL-MCNC: 101 MG/DL (ref 65–100)
HDLC SERPL-MCNC: 59 MG/DL (ref 40–60)
HDLC SERPL: 2.4
LDLC SERPL CALC-MCNC: 68 MG/DL
POTASSIUM SERPL-SCNC: 4.2 MMOL/L (ref 3.5–5.1)
PROT SERPL-MCNC: 7.2 G/DL (ref 6.3–8.2)
SODIUM SERPL-SCNC: 144 MMOL/L (ref 133–143)
TRIGL SERPL-MCNC: 65 MG/DL (ref 35–150)
VLDLC SERPL CALC-MCNC: 13 MG/DL (ref 6–23)

## 2023-05-11 ENCOUNTER — OFFICE VISIT (OUTPATIENT)
Dept: FAMILY MEDICINE CLINIC | Facility: CLINIC | Age: 68
End: 2023-05-11
Payer: MEDICARE

## 2023-05-11 VITALS
OXYGEN SATURATION: 97 % | BODY MASS INDEX: 31.28 KG/M2 | RESPIRATION RATE: 18 BRPM | WEIGHT: 170 LBS | HEIGHT: 62 IN | SYSTOLIC BLOOD PRESSURE: 120 MMHG | DIASTOLIC BLOOD PRESSURE: 80 MMHG | TEMPERATURE: 97 F | HEART RATE: 65 BPM

## 2023-05-11 DIAGNOSIS — E78.00 PURE HYPERCHOLESTEROLEMIA: ICD-10-CM

## 2023-05-11 DIAGNOSIS — Z13.1 SCREENING FOR DIABETES MELLITUS (DM): ICD-10-CM

## 2023-05-11 DIAGNOSIS — F51.01 PRIMARY INSOMNIA: ICD-10-CM

## 2023-05-11 DIAGNOSIS — N95.2 VAGINAL ATROPHY: ICD-10-CM

## 2023-05-11 DIAGNOSIS — I10 ESSENTIAL HYPERTENSION, BENIGN: Primary | ICD-10-CM

## 2023-05-11 DIAGNOSIS — L30.9 ECZEMA, UNSPECIFIED TYPE: ICD-10-CM

## 2023-05-11 DIAGNOSIS — E66.09 CLASS 1 OBESITY DUE TO EXCESS CALORIES WITH SERIOUS COMORBIDITY AND BODY MASS INDEX (BMI) OF 31.0 TO 31.9 IN ADULT: ICD-10-CM

## 2023-05-11 LAB
BASOPHILS # BLD: 0.1 K/UL (ref 0–0.2)
BASOPHILS NFR BLD: 1 % (ref 0–2)
DIFFERENTIAL METHOD BLD: NORMAL
EOSINOPHIL # BLD: 0.2 K/UL (ref 0–0.8)
EOSINOPHIL NFR BLD: 3 % (ref 0.5–7.8)
ERYTHROCYTE [DISTWIDTH] IN BLOOD BY AUTOMATED COUNT: 12.2 % (ref 11.9–14.6)
HCT VFR BLD AUTO: 41.4 % (ref 35.8–46.3)
HGB BLD-MCNC: 14 G/DL (ref 11.7–15.4)
IMM GRANULOCYTES # BLD AUTO: 0 K/UL (ref 0–0.5)
IMM GRANULOCYTES NFR BLD AUTO: 0 % (ref 0–5)
LYMPHOCYTES # BLD: 1.9 K/UL (ref 0.5–4.6)
LYMPHOCYTES NFR BLD: 32 % (ref 13–44)
MCH RBC QN AUTO: 32.8 PG (ref 26.1–32.9)
MCHC RBC AUTO-ENTMCNC: 33.8 G/DL (ref 31.4–35)
MCV RBC AUTO: 97 FL (ref 82–102)
MONOCYTES # BLD: 0.6 K/UL (ref 0.1–1.3)
MONOCYTES NFR BLD: 10 % (ref 4–12)
NEUTS SEG # BLD: 3.3 K/UL (ref 1.7–8.2)
NEUTS SEG NFR BLD: 54 % (ref 43–78)
NRBC # BLD: 0 K/UL (ref 0–0.2)
PLATELET # BLD AUTO: 247 K/UL (ref 150–450)
PMV BLD AUTO: 10 FL (ref 9.4–12.3)
RBC # BLD AUTO: 4.27 M/UL (ref 4.05–5.2)
WBC # BLD AUTO: 6 K/UL (ref 4.3–11.1)

## 2023-05-11 PROCEDURE — 1036F TOBACCO NON-USER: CPT | Performed by: FAMILY MEDICINE

## 2023-05-11 PROCEDURE — 3074F SYST BP LT 130 MM HG: CPT | Performed by: FAMILY MEDICINE

## 2023-05-11 PROCEDURE — 3079F DIAST BP 80-89 MM HG: CPT | Performed by: FAMILY MEDICINE

## 2023-05-11 PROCEDURE — 1123F ACP DISCUSS/DSCN MKR DOCD: CPT | Performed by: FAMILY MEDICINE

## 2023-05-11 PROCEDURE — G8417 CALC BMI ABV UP PARAM F/U: HCPCS | Performed by: FAMILY MEDICINE

## 2023-05-11 PROCEDURE — G8427 DOCREV CUR MEDS BY ELIG CLIN: HCPCS | Performed by: FAMILY MEDICINE

## 2023-05-11 PROCEDURE — 3017F COLORECTAL CA SCREEN DOC REV: CPT | Performed by: FAMILY MEDICINE

## 2023-05-11 PROCEDURE — G8399 PT W/DXA RESULTS DOCUMENT: HCPCS | Performed by: FAMILY MEDICINE

## 2023-05-11 PROCEDURE — 99214 OFFICE O/P EST MOD 30 MIN: CPT | Performed by: FAMILY MEDICINE

## 2023-05-11 PROCEDURE — 1090F PRES/ABSN URINE INCON ASSESS: CPT | Performed by: FAMILY MEDICINE

## 2023-05-11 RX ORDER — MEDROXYPROGESTERONE ACETATE 2.5 MG/1
2.5 TABLET ORAL
Qty: 12 TABLET | Refills: 0 | Status: SHIPPED | OUTPATIENT
Start: 2023-05-11

## 2023-05-11 RX ORDER — LOSARTAN POTASSIUM 100 MG/1
100 TABLET ORAL DAILY
Qty: 90 TABLET | Refills: 3 | Status: SHIPPED | OUTPATIENT
Start: 2023-05-11

## 2023-05-11 RX ORDER — CONJUGATED ESTROGENS 0.62 MG/G
1.5 CREAM VAGINAL
Qty: 42.5 G | Refills: 3 | Status: SHIPPED | OUTPATIENT
Start: 2023-05-11

## 2023-05-11 RX ORDER — TEMAZEPAM 15 MG/1
30 CAPSULE ORAL NIGHTLY PRN
Qty: 180 CAPSULE | Refills: 1 | Status: SHIPPED | OUTPATIENT
Start: 2023-06-01 | End: 2023-08-30

## 2023-05-11 RX ORDER — ROSUVASTATIN CALCIUM 10 MG/1
10 TABLET, COATED ORAL NIGHTLY
Qty: 90 TABLET | Refills: 3 | Status: SHIPPED | OUTPATIENT
Start: 2023-05-11

## 2023-05-11 ASSESSMENT — PATIENT HEALTH QUESTIONNAIRE - PHQ9
SUM OF ALL RESPONSES TO PHQ QUESTIONS 1-9: 0
SUM OF ALL RESPONSES TO PHQ QUESTIONS 1-9: 0
SUM OF ALL RESPONSES TO PHQ9 QUESTIONS 1 & 2: 0
2. FEELING DOWN, DEPRESSED OR HOPELESS: 0
SUM OF ALL RESPONSES TO PHQ QUESTIONS 1-9: 0
1. LITTLE INTEREST OR PLEASURE IN DOING THINGS: 0
SUM OF ALL RESPONSES TO PHQ QUESTIONS 1-9: 0

## 2023-05-11 ASSESSMENT — ENCOUNTER SYMPTOMS
VOMITING: 0
COUGH: 0
DIARRHEA: 0
NAUSEA: 0
SHORTNESS OF BREATH: 0

## 2023-05-12 LAB
ALBUMIN SERPL-MCNC: 4 G/DL (ref 3.2–4.6)
ALBUMIN/GLOB SERPL: 1.3 (ref 0.4–1.6)
ALP SERPL-CCNC: 59 U/L (ref 50–136)
ALT SERPL-CCNC: 31 U/L (ref 12–65)
ANION GAP SERPL CALC-SCNC: 2 MMOL/L (ref 2–11)
AST SERPL-CCNC: 20 U/L (ref 15–37)
BILIRUB SERPL-MCNC: 0.4 MG/DL (ref 0.2–1.1)
BUN SERPL-MCNC: 17 MG/DL (ref 8–23)
CALCIUM SERPL-MCNC: 8.9 MG/DL (ref 8.3–10.4)
CHLORIDE SERPL-SCNC: 113 MMOL/L (ref 101–110)
CHOLEST SERPL-MCNC: 149 MG/DL
CO2 SERPL-SCNC: 25 MMOL/L (ref 21–32)
CREAT SERPL-MCNC: 0.8 MG/DL (ref 0.6–1)
GLOBULIN SER CALC-MCNC: 3.2 G/DL (ref 2.8–4.5)
GLUCOSE SERPL-MCNC: 96 MG/DL (ref 65–100)
HDLC SERPL-MCNC: 53 MG/DL (ref 40–60)
HDLC SERPL: 2.8
LDLC SERPL CALC-MCNC: 82.6 MG/DL
POTASSIUM SERPL-SCNC: 4.2 MMOL/L (ref 3.5–5.1)
PROT SERPL-MCNC: 7.2 G/DL (ref 6.3–8.2)
SODIUM SERPL-SCNC: 140 MMOL/L (ref 133–143)
TRIGL SERPL-MCNC: 67 MG/DL (ref 35–150)
VLDLC SERPL CALC-MCNC: 13.4 MG/DL (ref 6–23)

## 2023-05-12 NOTE — PROGRESS NOTES
Tyson Watkins (:  1955) is a 78 y.o. female,Established patient, here for evaluation of the following chief complaint(s):  Follow-up (Chronic care follow up. Fasting. ), Hypertension, and Cholesterol Problem         ASSESSMENT/PLAN:  1. Essential hypertension, benign- well-controlled  -     CBC with Auto Differential; Future  -     Comprehensive Metabolic Panel; Future  -     losartan (COZAAR) 100 MG tablet; Take 1 tablet by mouth daily TAKE 1 TABLET BY MOUTH EVERY DAY, Disp-90 tablet, R-3Normal  2. Pure hypercholesterolemia- well-controlled  -     Comprehensive Metabolic Panel; Future  -     Lipid Panel; Future  -     rosuvastatin (CRESTOR) 10 MG tablet; Take 1 tablet by mouth at bedtime, Disp-90 tablet, R-3Normal  3. Screening for diabetes mellitus (DM)  -     Comprehensive Metabolic Panel; Future  4. Primary insomnia  -    RF temazepam (RESTORIL) 15 MG capsule; Take 2 capsules by mouth nightly as needed for Sleep for up to 90 days. 2 poqhs Max Daily Amount: 30 mg, Disp-180 capsule, R-1Normal  5. Vaginal atrophy  -     estrogens conjugated (PREMARIN) 0.625 MG/GM CREA vaginal cream; Place 1.5 g vaginally every 7 days, Vaginal, EVERY 7 DAYS Starting Thu 2023, Disp-42.5 g, R-3, Normal  -     medroxyPROGESTERone (PROVERA) 2.5 MG tablet; Take 1 tablet by mouth every 30 days, Disp-12 tablet, R-0Normal  6. Class 1 obesity due to excess calories with serious comorbidity and body mass index (BMI) of 31.0 to 31.9 in adult  Assessment & Plan:   Uncontrolled, lifestyle modifications recommended  Bmi handout  7. Eczema, unspecified type  -     hydrocortisone 2.5 % cream; Apply topically 2 times daily. , Disp-28 g, R-1, Normal      Return in about 3 months (around 2023) for fasting chronic care. Subjective   SUBJECTIVE/OBJECTIVE:  Hypertension  This is a chronic problem. The current episode started more than 1 year ago. The problem is unchanged. The problem is controlled.  Pertinent negatives include no

## 2023-08-11 ENCOUNTER — OFFICE VISIT (OUTPATIENT)
Dept: FAMILY MEDICINE CLINIC | Facility: CLINIC | Age: 68
End: 2023-08-11

## 2023-08-11 VITALS
HEIGHT: 62 IN | OXYGEN SATURATION: 9 % | BODY MASS INDEX: 31.73 KG/M2 | HEART RATE: 68 BPM | DIASTOLIC BLOOD PRESSURE: 86 MMHG | SYSTOLIC BLOOD PRESSURE: 132 MMHG | WEIGHT: 172.4 LBS

## 2023-08-11 DIAGNOSIS — H61.23 BILATERAL IMPACTED CERUMEN: ICD-10-CM

## 2023-08-11 DIAGNOSIS — L30.9 ECZEMA, UNSPECIFIED TYPE: ICD-10-CM

## 2023-08-11 DIAGNOSIS — E66.09 CLASS 1 OBESITY DUE TO EXCESS CALORIES WITH SERIOUS COMORBIDITY AND BODY MASS INDEX (BMI) OF 31.0 TO 31.9 IN ADULT: ICD-10-CM

## 2023-08-11 DIAGNOSIS — Z12.31 ENCOUNTER FOR SCREENING MAMMOGRAM FOR BREAST CANCER: ICD-10-CM

## 2023-08-11 DIAGNOSIS — F51.01 PRIMARY INSOMNIA: ICD-10-CM

## 2023-08-11 DIAGNOSIS — E78.00 PURE HYPERCHOLESTEROLEMIA: ICD-10-CM

## 2023-08-11 DIAGNOSIS — I10 ESSENTIAL HYPERTENSION, BENIGN: Primary | ICD-10-CM

## 2023-08-11 DIAGNOSIS — Z12.11 COLON CANCER SCREENING: ICD-10-CM

## 2023-08-11 DIAGNOSIS — N95.2 VAGINAL ATROPHY: ICD-10-CM

## 2023-08-11 LAB
ALBUMIN SERPL-MCNC: 4.1 G/DL (ref 3.2–4.6)
ALBUMIN/GLOB SERPL: 1.2 (ref 0.4–1.6)
ALP SERPL-CCNC: 60 U/L (ref 50–136)
ALT SERPL-CCNC: 29 U/L (ref 12–65)
ANION GAP SERPL CALC-SCNC: 9 MMOL/L (ref 2–11)
AST SERPL-CCNC: 19 U/L (ref 15–37)
BASOPHILS # BLD: 0.1 K/UL (ref 0–0.2)
BASOPHILS NFR BLD: 1 % (ref 0–2)
BILIRUB SERPL-MCNC: 0.7 MG/DL (ref 0.2–1.1)
BUN SERPL-MCNC: 12 MG/DL (ref 8–23)
CALCIUM SERPL-MCNC: 9.5 MG/DL (ref 8.3–10.4)
CHLORIDE SERPL-SCNC: 111 MMOL/L (ref 101–110)
CHOLEST SERPL-MCNC: 147 MG/DL
CO2 SERPL-SCNC: 22 MMOL/L (ref 21–32)
CREAT SERPL-MCNC: 0.9 MG/DL (ref 0.6–1)
DIFFERENTIAL METHOD BLD: NORMAL
EOSINOPHIL # BLD: 0.1 K/UL (ref 0–0.8)
EOSINOPHIL NFR BLD: 2 % (ref 0.5–7.8)
ERYTHROCYTE [DISTWIDTH] IN BLOOD BY AUTOMATED COUNT: 11.9 % (ref 11.9–14.6)
GLOBULIN SER CALC-MCNC: 3.4 G/DL (ref 2.8–4.5)
GLUCOSE SERPL-MCNC: 104 MG/DL (ref 65–100)
HCT VFR BLD AUTO: 42.1 % (ref 35.8–46.3)
HDLC SERPL-MCNC: 57 MG/DL (ref 40–60)
HDLC SERPL: 2.6
HGB BLD-MCNC: 14.4 G/DL (ref 11.7–15.4)
IMM GRANULOCYTES # BLD AUTO: 0 K/UL (ref 0–0.5)
IMM GRANULOCYTES NFR BLD AUTO: 0 % (ref 0–5)
LDLC SERPL CALC-MCNC: 71.4 MG/DL
LYMPHOCYTES # BLD: 1.8 K/UL (ref 0.5–4.6)
LYMPHOCYTES NFR BLD: 26 % (ref 13–44)
MCH RBC QN AUTO: 32.1 PG (ref 26.1–32.9)
MCHC RBC AUTO-ENTMCNC: 34.2 G/DL (ref 31.4–35)
MCV RBC AUTO: 93.8 FL (ref 82–102)
MONOCYTES # BLD: 0.5 K/UL (ref 0.1–1.3)
MONOCYTES NFR BLD: 7 % (ref 4–12)
NEUTS SEG # BLD: 4.5 K/UL (ref 1.7–8.2)
NEUTS SEG NFR BLD: 64 % (ref 43–78)
NRBC # BLD: 0 K/UL (ref 0–0.2)
PLATELET # BLD AUTO: 260 K/UL (ref 150–450)
PMV BLD AUTO: 9.7 FL (ref 9.4–12.3)
POTASSIUM SERPL-SCNC: 4.1 MMOL/L (ref 3.5–5.1)
PROT SERPL-MCNC: 7.5 G/DL (ref 6.3–8.2)
RBC # BLD AUTO: 4.49 M/UL (ref 4.05–5.2)
SODIUM SERPL-SCNC: 142 MMOL/L (ref 133–143)
TRIGL SERPL-MCNC: 93 MG/DL (ref 35–150)
VLDLC SERPL CALC-MCNC: 18.6 MG/DL (ref 6–23)
WBC # BLD AUTO: 7 K/UL (ref 4.3–11.1)

## 2023-08-11 RX ORDER — TEMAZEPAM 15 MG/1
30 CAPSULE ORAL NIGHTLY PRN
Qty: 180 CAPSULE | Refills: 1 | Status: SHIPPED | OUTPATIENT
Start: 2023-08-30 | End: 2024-02-26

## 2023-08-11 RX ORDER — MEDROXYPROGESTERONE ACETATE 2.5 MG/1
2.5 TABLET ORAL
Qty: 12 TABLET | Refills: 0 | Status: SHIPPED | OUTPATIENT
Start: 2023-08-11

## 2023-08-11 RX ORDER — CONJUGATED ESTROGENS 0.62 MG/G
1.5 CREAM VAGINAL
Qty: 42.5 G | Refills: 3 | Status: SHIPPED | OUTPATIENT
Start: 2023-08-11

## 2023-08-11 RX ORDER — LOSARTAN POTASSIUM 100 MG/1
100 TABLET ORAL DAILY
Qty: 90 TABLET | Refills: 3 | Status: SHIPPED | OUTPATIENT
Start: 2023-08-11

## 2023-08-11 RX ORDER — ROSUVASTATIN CALCIUM 10 MG/1
10 TABLET, COATED ORAL NIGHTLY
Qty: 90 TABLET | Refills: 3 | Status: SHIPPED | OUTPATIENT
Start: 2023-08-11

## 2023-08-11 ASSESSMENT — ENCOUNTER SYMPTOMS
DIARRHEA: 0
SHORTNESS OF BREATH: 0
NAUSEA: 0
COUGH: 0
VOMITING: 0

## 2023-08-11 ASSESSMENT — PATIENT HEALTH QUESTIONNAIRE - PHQ9
SUM OF ALL RESPONSES TO PHQ QUESTIONS 1-9: 0
1. LITTLE INTEREST OR PLEASURE IN DOING THINGS: 0
SUM OF ALL RESPONSES TO PHQ QUESTIONS 1-9: 0
SUM OF ALL RESPONSES TO PHQ9 QUESTIONS 1 & 2: 0
2. FEELING DOWN, DEPRESSED OR HOPELESS: 0

## 2023-08-11 NOTE — PROGRESS NOTES
appearance. She is obese. HENT:      Head: Normocephalic and atraumatic. Nose: Nose normal.      Mouth/Throat:      Pharynx: Oropharynx is clear. Eyes:      Extraocular Movements: Extraocular movements intact. Conjunctiva/sclera: Conjunctivae normal.   Cardiovascular:      Rate and Rhythm: Normal rate and regular rhythm. Pulses: Normal pulses. Heart sounds: Normal heart sounds. Pulmonary:      Effort: Pulmonary effort is normal.      Breath sounds: Normal breath sounds. Musculoskeletal:         General: No swelling or tenderness. Normal range of motion. Cervical back: Normal range of motion and neck supple. Skin:     General: Skin is warm and dry. Neurological:      General: No focal deficit present. Mental Status: She is alert. Mental status is at baseline. Psychiatric:         Mood and Affect: Mood normal.         Behavior: Behavior normal.              An electronic signature was used to authenticate this note.     --Gallo Yeung MD

## 2023-10-12 ENCOUNTER — HOSPITAL ENCOUNTER (OUTPATIENT)
Dept: MAMMOGRAPHY | Age: 68
Discharge: HOME | End: 2023-10-12
Attending: FAMILY MEDICINE
Payer: MEDICARE

## 2023-10-12 DIAGNOSIS — Z12.31 ENCOUNTER FOR SCREENING MAMMOGRAM FOR BREAST CANCER: ICD-10-CM

## 2023-10-12 PROCEDURE — 77067 SCR MAMMO BI INCL CAD: CPT

## 2023-11-06 NOTE — PROGRESS NOTES
Morenita Hill  : 1955  Primary: [facility]  Secondary:  [id number] at [place] [id number] [facility], [address] [place] [address],[address], [facility] [address].  [phone]   [phone]          OUTPATIENT PHYSICAL THERAPY:Daily Note 2018    ICD-10: Treatment Diagnosis: low back pain M54.5  Precautions/Allergies:   Review of patient's allergies indicates no known allergies. Fall Risk Score: 0 (? 5 = High Risk)  MD Orders: Eval and treat MEDICAL/REFERRING DIAGNOSIS:  Pain in thoracic spine [M54.6]  Other chronic pain [G89.29]  Sacrococcygeal disorders, not elsewhere classified [M53.3]   DATE OF ONSET: 18  REFERRING PHYSICIAN: Lj Cisneros MD  RETURN PHYSICIAN APPOINTMENT: TBD     INITIAL ASSESSMENT:  Ms. Juan Alberto Hand presents with tender and hypertonic L lumbar paraspinals and L glutes at iliac crest, poor glute strength and activation, decreased TA strength and excessive lumbar lordosis that limits ability to bend over and perform daily activities. Pt would benefit from skilled therapy to address these deficits for return to previous level of function. PROBLEM LIST (Impacting functional limitations):  1. Decreased Strength  2. Decreased ADL/Functional Activities  3. Increased Pain  4. Decreased Flexibility/Joint Mobility  5. Decreased [place] with Home Exercise Program INTERVENTIONS PLANNED:  1. Cold  2. Electrical Stimulation  3. Heat  4. Home Exercise Program (HEP)  5. Manual Therapy  6. Range of Motion (ROM)  7. Therapeutic Exercise/Strengthening   TREATMENT PLAN:  Effective Dates: 18 TO 18. Frequency/Duration: 2 times a week for 6 weeks  GOALS: (Goals have been discussed and agreed upon with patient.)  Short-Term Functional Goals: Time Frame: 4 weeks  1. Pt will be I with HEP to allow for continued progress with symptom management.   2. Pt will demonstrate 15 glute bridges without hamstring activation to demonstrate improved neuromuscular utilization of glutes for functional activities. Discharge Goals: Time Frame: 6 weeks  1. Pt will improve VIPIN score to <10 to reflect an improvement in function. 2. Pt will improve c/o pain to 2/10 to allow for improved tolerance for work duties. 3. Pt will improve glute strength to 5/5 for improved SIJ stability during functional activities. Rehabilitation Potential For Stated Goals: Good            The information in this section was collected on 01-26-18 (except where otherwise noted). HISTORY:   History of Present Injury/Illness (Reason for Referral):  Pt is a 65 y.o. Female presenting to PT with chronic LBP. 3 weeks ago lifted something that was pretty heavy and then had some pain during intercourse and has had some pain in her left sided low back. Pain has improved since onset. In the beginning pain was a sharp catching pain and had trouble standing and walking. Is currently taking ibuprofen and meloxicam.  Has trouble finding a comfortable position but often sleeps on her side. Has been diagnosed with scoliosis in the past.  Has also been told that she has 1/4-1/2 LLI with L shorter. Has been diagnosed with osteopenia  Past Medical History/Comorbidities:   Ms. Ngoc Lopez  has a past medical history of Anxiety state, unspecified; Carpal tunnel syndrome; Essential hypertension, benign; Insomnia, unspecified; Pain in joint, multiple sites; Plantar fascial fibromatosis; Postmenopausal atrophic vaginitis; and Unspecified hypothyroidism. Ms. Ngoc Lopez  has a past surgical history that includes hx carpal tunnel release (Bilateral, 8/2010) and hx cataract removal (8/06).   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 Max Daily Amount: 30 mg., Disp: 90 Cap, Rfl: 1    conjugated estrogens (PREMARIN) 0.625 mg/gram vaginal cream, Insert 1.5 g into vagina daily. , Disp: 45 g, Rfl: 11    losartan (COZAAR) 100 mg tablet, TAKE 1 TABLET BY MOUTH EVERY DAY, Disp: 90 Tab, Rfl: 3   Date Last Reviewed:  2/20/2018    Number of Personal Factors/Comorbidities that affect the Plan of Care: 1-2: MODERATE COMPLEXITY   EXAMINATION:   Observation/Orthostatic Postural Assessment:          Excessive lumbar lordosis  Palpation:          Tenderness and hypertonicity in L lumbar and thoracic paraspinals and glutes        T4-5 improved symptoms with grade III CPA  ROM:          LUMBAR SPINE    AROM    Flexion   Extension  Right Rotation  Left Rotation  Right Sidebend  Left Sidebend 90 %  80 % pain on L  80 % pain on L  80 %  65 %  65 % pain on L     Strength:          LE- 5/5 bilaterally  Except glutes 4/5       TA- 4/5  Special Tests:          SIJ CPR-  Weakly positive        Slump-  Negative  Functional Mobility:         WNL   Body Structures Involved:  1. Muscles Body Functions Affected:  1. Sensory/Pain  2. Movement Related Activities and Participation Affected:  1. General Tasks and Demands  2. Mobility   Number of elements (examined above) that affect the Plan of Care: 3: MODERATE COMPLEXITY   CLINICAL PRESENTATION:   Presentation: Evolving clinical presentation with changing clinical characteristics: MODERATE COMPLEXITY   CLINICAL DECISION MAKING:   Outcome Measure: Tool Used: Modified Oswestry Low Back Pain Questionnaire  Score:  Initial: 15/50  Most Recent: X/50 (Date: -- )   Interpretation of Score: Each section is scored on a 0-5 scale, 5 representing the greatest disability. The scores of each section are added together for a total score of 50.     Score 0 1-10 11-20 21-30 31-40 41-49 50   Modifier CH CI CJ CK CL CM CN ? Mobility - Walking and Moving Around:     - CURRENT STATUS: CJ - 20%-39% impaired, limited or restricted    - GOAL STATUS: CI - 1%-19% impaired, limited or restricted    - D/C STATUS:  ---------------To be determined---------------    Medical Necessity:   · Patient is expected to demonstrate progress in strength and range of motion to return to previous level of function. Reason for Services/Other Comments:  · Patient continues to require present interventions due to patient's inability to prolonged stand. Use of outcome tool(s) and clinical judgement create a POC that gives a: Questionable prediction of patient's progress: MODERATE COMPLEXITY            TREATMENT:   (In addition to Assessment/Re-Assessment sessions the following treatments were rendered)  Pre-treatment Symptoms/Complaints:  2/20/2018: Patient reports that she was in pain Friday but feels a little better today. Pain: Initial: Pain Intensity 1: 4  Post Session:  4/10     THERAPEUTIC EXERCISE: (30 minutes):  Exercises per grid below to improve mobility and strength. Required moderate visual, verbal and manual cues to promote proper body alignment, promote proper body posture and promote proper body mechanics. Progressed resistance, range and repetitions as indicated.    Date:  02-08-18 Date:  02-13-18 Date:  02-15-18 Date:  02-20-18   Activity/Exercise       SIJ MET resisted R hip flexion and L hip ext       bridge 20 reps Figure 4 x 15 reps Figure 4 x 20 reps    Tennis ball paraspinal and glute release       Nu-step 6 min level 4.0 6 min level 4.0 6 min level 4.0 6 min level 4.0   Standing theraband rows       Standing lat pulls Blue x 20 reps Blue x 20 reps Blue x 20 reps    Active hamstring stretch       Hooklying posterior thigh/piriformis stretch       clamshell Blue x 20 reps Blue x 20 reps Blue x 20 reps    March with glutes 20 reps      SLR with TA 20 reps 20 reps 20 reps each    March with TA 20 reps 20 reps 20 reps each    Isometric oblques Blue x 20 reps each Blue x 20 reps Blue x 20 reps each    Isometric hip flexion 10x5 sec hold 10x5 sec hold     Side stepping with band 2x30 ft      Isometric hip ER on wall 10x3 sec hold each  10x3 sec hold each D/C   Quadruped rocking  10x10 sec hold each 10x10 sec hold    Alt. UE flexion in quad  20 reps 20 reps 20 reps   Cat/cow quadruped  20 reps 20 reps D/C   adenike pose    3x30 sec hold   March with glutes    2x30 ft   Side stepping with band    Green 2x30 ft      MANUAL THERAPY: (15 minutes): Soft tissue mobilization was utilized and necessary because of the patient's painful spasm and restricted motion of soft tissue. Rib7 mobilization, side lying SIJ mobilization on L  [facility] Portal  Treatment/Session Assessment:    · Response to Treatment:  Patient with good improvement of symptoms with  Manual therapy. Changed HEP to d/c cat/cow. · Compliance with Program/Exercises: Will assess as treatment progresses. · Recommendations/Intent for next treatment session: \"Next visit will focus on advancements to more challenging activities\".   Total Treatment Duration:  PT Patient Time In/Time Out  Time In: 1630  Time Out: 120 formerly [facility] WHEEZES

## 2023-11-14 ENCOUNTER — OFFICE VISIT (OUTPATIENT)
Dept: FAMILY MEDICINE CLINIC | Facility: CLINIC | Age: 68
End: 2023-11-14
Payer: MEDICARE

## 2023-11-14 VITALS
DIASTOLIC BLOOD PRESSURE: 72 MMHG | TEMPERATURE: 98.2 F | OXYGEN SATURATION: 97 % | BODY MASS INDEX: 30.91 KG/M2 | WEIGHT: 168 LBS | HEART RATE: 67 BPM | HEIGHT: 62 IN | SYSTOLIC BLOOD PRESSURE: 124 MMHG | RESPIRATION RATE: 18 BRPM

## 2023-11-14 DIAGNOSIS — N95.2 VAGINAL ATROPHY: ICD-10-CM

## 2023-11-14 DIAGNOSIS — Z23 NEED FOR VACCINATION AGAINST STREPTOCOCCUS PNEUMONIAE: ICD-10-CM

## 2023-11-14 DIAGNOSIS — E78.00 PURE HYPERCHOLESTEROLEMIA: ICD-10-CM

## 2023-11-14 DIAGNOSIS — E66.09 CLASS 1 OBESITY DUE TO EXCESS CALORIES WITH SERIOUS COMORBIDITY AND BODY MASS INDEX (BMI) OF 30.0 TO 30.9 IN ADULT: ICD-10-CM

## 2023-11-14 DIAGNOSIS — L30.9 ECZEMA, UNSPECIFIED TYPE: ICD-10-CM

## 2023-11-14 DIAGNOSIS — I10 ESSENTIAL HYPERTENSION, BENIGN: Primary | ICD-10-CM

## 2023-11-14 DIAGNOSIS — F51.01 PRIMARY INSOMNIA: ICD-10-CM

## 2023-11-14 DIAGNOSIS — Z12.11 COLON CANCER SCREENING: Primary | ICD-10-CM

## 2023-11-14 LAB
ALBUMIN SERPL-MCNC: 4.3 G/DL (ref 3.2–4.6)
ALBUMIN/GLOB SERPL: 1.2 (ref 0.4–1.6)
ALP SERPL-CCNC: 52 U/L (ref 50–136)
ALT SERPL-CCNC: 32 U/L (ref 12–65)
ANION GAP SERPL CALC-SCNC: 6 MMOL/L (ref 2–11)
AST SERPL-CCNC: 21 U/L (ref 15–37)
BASOPHILS # BLD: 0 K/UL (ref 0–0.2)
BASOPHILS NFR BLD: 0 % (ref 0–2)
BILIRUB SERPL-MCNC: 0.8 MG/DL (ref 0.2–1.1)
BUN SERPL-MCNC: 14 MG/DL (ref 8–23)
CALCIUM SERPL-MCNC: 9.7 MG/DL (ref 8.3–10.4)
CHLORIDE SERPL-SCNC: 111 MMOL/L (ref 101–110)
CHOLEST SERPL-MCNC: 128 MG/DL
CO2 SERPL-SCNC: 22 MMOL/L (ref 21–32)
CREAT SERPL-MCNC: 1 MG/DL (ref 0.6–1)
DIFFERENTIAL METHOD BLD: NORMAL
EOSINOPHIL # BLD: 0.1 K/UL (ref 0–0.8)
EOSINOPHIL NFR BLD: 1 % (ref 0.5–7.8)
ERYTHROCYTE [DISTWIDTH] IN BLOOD BY AUTOMATED COUNT: 12 % (ref 11.9–14.6)
GLOBULIN SER CALC-MCNC: 3.6 G/DL (ref 2.8–4.5)
GLUCOSE SERPL-MCNC: 91 MG/DL (ref 65–100)
HCT VFR BLD AUTO: 43.2 % (ref 35.8–46.3)
HDLC SERPL-MCNC: 54 MG/DL (ref 40–60)
HDLC SERPL: 2.4
HEMOCCULT STL QL: NORMAL
HGB BLD-MCNC: 14.8 G/DL (ref 11.7–15.4)
IMM GRANULOCYTES # BLD AUTO: 0 K/UL (ref 0–0.5)
IMM GRANULOCYTES NFR BLD AUTO: 0 % (ref 0–5)
LDLC SERPL CALC-MCNC: 57.2 MG/DL
LYMPHOCYTES # BLD: 1.7 K/UL (ref 0.5–4.6)
LYMPHOCYTES NFR BLD: 23 % (ref 13–44)
MCH RBC QN AUTO: 32 PG (ref 26.1–32.9)
MCHC RBC AUTO-ENTMCNC: 34.3 G/DL (ref 31.4–35)
MCV RBC AUTO: 93.5 FL (ref 82–102)
MONOCYTES # BLD: 0.5 K/UL (ref 0.1–1.3)
MONOCYTES NFR BLD: 7 % (ref 4–12)
NEUTS SEG # BLD: 5.2 K/UL (ref 1.7–8.2)
NEUTS SEG NFR BLD: 69 % (ref 43–78)
NRBC # BLD: 0 K/UL (ref 0–0.2)
PLATELET # BLD AUTO: 238 K/UL (ref 150–450)
PMV BLD AUTO: 10.6 FL (ref 9.4–12.3)
POTASSIUM SERPL-SCNC: 3.9 MMOL/L (ref 3.5–5.1)
PROT SERPL-MCNC: 7.9 G/DL (ref 6.3–8.2)
RBC # BLD AUTO: 4.62 M/UL (ref 4.05–5.2)
SODIUM SERPL-SCNC: 139 MMOL/L (ref 133–143)
TRIGL SERPL-MCNC: 84 MG/DL (ref 35–150)
VALID INTERNAL CONTROL: YES
VLDLC SERPL CALC-MCNC: 16.8 MG/DL (ref 6–23)
WBC # BLD AUTO: 7.6 K/UL (ref 4.3–11.1)

## 2023-11-14 PROCEDURE — G8427 DOCREV CUR MEDS BY ELIG CLIN: HCPCS | Performed by: FAMILY MEDICINE

## 2023-11-14 PROCEDURE — 1123F ACP DISCUSS/DSCN MKR DOCD: CPT | Performed by: FAMILY MEDICINE

## 2023-11-14 PROCEDURE — 1090F PRES/ABSN URINE INCON ASSESS: CPT | Performed by: FAMILY MEDICINE

## 2023-11-14 PROCEDURE — 90677 PCV20 VACCINE IM: CPT | Performed by: FAMILY MEDICINE

## 2023-11-14 PROCEDURE — G8399 PT W/DXA RESULTS DOCUMENT: HCPCS | Performed by: FAMILY MEDICINE

## 2023-11-14 PROCEDURE — 99214 OFFICE O/P EST MOD 30 MIN: CPT | Performed by: FAMILY MEDICINE

## 2023-11-14 PROCEDURE — G8417 CALC BMI ABV UP PARAM F/U: HCPCS | Performed by: FAMILY MEDICINE

## 2023-11-14 PROCEDURE — G8484 FLU IMMUNIZE NO ADMIN: HCPCS | Performed by: FAMILY MEDICINE

## 2023-11-14 PROCEDURE — 3078F DIAST BP <80 MM HG: CPT | Performed by: FAMILY MEDICINE

## 2023-11-14 PROCEDURE — 3017F COLORECTAL CA SCREEN DOC REV: CPT | Performed by: FAMILY MEDICINE

## 2023-11-14 PROCEDURE — 3074F SYST BP LT 130 MM HG: CPT | Performed by: FAMILY MEDICINE

## 2023-11-14 PROCEDURE — 1036F TOBACCO NON-USER: CPT | Performed by: FAMILY MEDICINE

## 2023-11-14 PROCEDURE — G0009 ADMIN PNEUMOCOCCAL VACCINE: HCPCS | Performed by: FAMILY MEDICINE

## 2023-11-14 RX ORDER — TEMAZEPAM 15 MG/1
30 CAPSULE ORAL NIGHTLY PRN
Qty: 180 CAPSULE | Refills: 1 | Status: SHIPPED | OUTPATIENT
Start: 2023-11-14 | End: 2024-05-12

## 2023-11-14 RX ORDER — LOSARTAN POTASSIUM 100 MG/1
100 TABLET ORAL DAILY
Qty: 90 TABLET | Refills: 3 | Status: SHIPPED | OUTPATIENT
Start: 2023-11-14

## 2023-11-14 RX ORDER — MEDROXYPROGESTERONE ACETATE 2.5 MG/1
2.5 TABLET ORAL
Qty: 12 TABLET | Refills: 0 | Status: SHIPPED | OUTPATIENT
Start: 2023-11-14

## 2023-11-14 RX ORDER — ROSUVASTATIN CALCIUM 10 MG/1
10 TABLET, COATED ORAL NIGHTLY
Qty: 90 TABLET | Refills: 3 | Status: SHIPPED | OUTPATIENT
Start: 2023-11-14

## 2023-11-14 RX ORDER — CONJUGATED ESTROGENS 0.62 MG/G
1.5 CREAM VAGINAL
Qty: 42.5 G | Refills: 3 | Status: SHIPPED | OUTPATIENT
Start: 2023-11-14

## 2023-11-14 ASSESSMENT — ENCOUNTER SYMPTOMS
NAUSEA: 0
VOMITING: 0
COUGH: 0
SHORTNESS OF BREATH: 0
DIARRHEA: 0

## 2023-11-14 ASSESSMENT — PATIENT HEALTH QUESTIONNAIRE - PHQ9
1. LITTLE INTEREST OR PLEASURE IN DOING THINGS: 0
SUM OF ALL RESPONSES TO PHQ QUESTIONS 1-9: 0
SUM OF ALL RESPONSES TO PHQ9 QUESTIONS 1 & 2: 0
SUM OF ALL RESPONSES TO PHQ QUESTIONS 1-9: 0
2. FEELING DOWN, DEPRESSED OR HOPELESS: 0

## 2023-11-14 NOTE — PROGRESS NOTES
Yessica Lemons (:  1955) is a 80 y.o. female,Established patient, here for evaluation of the following chief complaint(s):  Follow-up (Chronic care follow up. Fasting. ), Hypertension, Cholesterol Problem, and Other (Vaginal dryness. )         ASSESSMENT/PLAN:  1. Essential hypertension, benign- well-controled  -     CBC with Auto Differential; Future  -     Comprehensive Metabolic Panel; Future  -     losartan (COZAAR) 100 MG tablet; Take 1 tablet by mouth daily TAKE 1 TABLET BY MOUTH EVERY DAY, Disp-90 tablet, R-3Normal  2. Pure hypercholesterolemia-stable, recheck labs  -     Comprehensive Metabolic Panel; Future  -     Lipid Panel; Future  -     rosuvastatin (CRESTOR) 10 MG tablet; Take 1 tablet by mouth at bedtime, Disp-90 tablet, R-3Normal  3. Vaginal atrophy- stable, may go to 2x/week  -     estrogens conjugated (PREMARIN) 0.625 MG/GM CREA vaginal cream; Place 1.5 g vaginally every 7 days, Vaginal, EVERY 7 DAYS Starting 2023, Disp-42.5 g, R-3, Normal  -     medroxyPROGESTERone (PROVERA) 2.5 MG tablet; Take 1 tablet by mouth every 30 days, Disp-12 tablet, R-0Normal  4. Eczema, unspecified type-stable  -     hydrocortisone 2.5 % cream; Apply topically 2 times daily. , Disp-28 g, R-1, Normal  5. Primary insomnia-stable, refill med  -     temazepam (RESTORIL) 15 MG capsule; Take 2 capsules by mouth nightly as needed for Sleep for up to 180 days. 2 poqhs Max Daily Amount: 30 mg, Disp-180 capsule, R-1Normal  6. Need for vaccination against Streptococcus pneumoniae  -     Pneumococcal, PCV20, PREVNAR 20, (age 6w+), IM, PF  7. Class 1 obesity due to excess calories with serious comorbidity and body mass index (BMI) of 30.0 to 30.9 in adult  Assessment & Plan:   Uncontrolled, lifestyle modifications recommended  Bmi handout      Return in about 3 months (around 2024) for fasting chronic care. Subjective   SUBJECTIVE/OBJECTIVE:  Hypertension  This is a chronic problem.  The current episode

## 2023-11-16 ENCOUNTER — TELEMEDICINE (OUTPATIENT)
Dept: FAMILY MEDICINE CLINIC | Facility: CLINIC | Age: 68
End: 2023-11-16
Payer: MEDICARE

## 2023-11-16 DIAGNOSIS — Z00.00 MEDICARE ANNUAL WELLNESS VISIT, SUBSEQUENT: Primary | ICD-10-CM

## 2023-11-16 PROCEDURE — G0439 PPPS, SUBSEQ VISIT: HCPCS | Performed by: FAMILY MEDICINE

## 2023-11-16 PROCEDURE — 3017F COLORECTAL CA SCREEN DOC REV: CPT | Performed by: FAMILY MEDICINE

## 2023-11-16 PROCEDURE — 1123F ACP DISCUSS/DSCN MKR DOCD: CPT | Performed by: FAMILY MEDICINE

## 2023-11-16 PROCEDURE — G8484 FLU IMMUNIZE NO ADMIN: HCPCS | Performed by: FAMILY MEDICINE

## 2023-11-16 ASSESSMENT — PATIENT HEALTH QUESTIONNAIRE - PHQ9
SUM OF ALL RESPONSES TO PHQ QUESTIONS 1-9: 0
1. LITTLE INTEREST OR PLEASURE IN DOING THINGS: 0
SUM OF ALL RESPONSES TO PHQ QUESTIONS 1-9: 0
SUM OF ALL RESPONSES TO PHQ QUESTIONS 1-9: 0
2. FEELING DOWN, DEPRESSED OR HOPELESS: 0
SUM OF ALL RESPONSES TO PHQ QUESTIONS 1-9: 0
SUM OF ALL RESPONSES TO PHQ9 QUESTIONS 1 & 2: 0

## 2023-11-16 ASSESSMENT — LIFESTYLE VARIABLES
HOW OFTEN DO YOU HAVE A DRINK CONTAINING ALCOHOL: 2-3 TIMES A WEEK
HOW MANY STANDARD DRINKS CONTAINING ALCOHOL DO YOU HAVE ON A TYPICAL DAY: 1 OR 2

## 2023-11-16 NOTE — PROGRESS NOTES
Medicare Annual Wellness Visit    Rudy Martínez is here for Medicare AWV    Assessment & Plan   Medicare annual wellness visit, subsequent  Recommendations for Preventive Services Due: see orders and patient instructions/AVS.  Recommended screening schedule for the next 5-10 years is provided to the patient in written form: see Patient Instructions/AVS.     No follow-ups on file. Subjective       Patient's complete Health Risk Assessment and screening values have been reviewed and are found in Flowsheets. The following problems were reviewed today and where indicated follow up appointments were made and/or referrals ordered. Positive Risk Factor Screenings with Interventions:                 Weight and Activity:  Physical Activity: Insufficiently Active (11/16/2023)    Exercise Vital Sign     Days of Exercise per Week: 2 days     Minutes of Exercise per Session: 60 min     On average, how many days per week do you engage in moderate to strenuous exercise (like a brisk walk)?: 2 days  Have you lost any weight without trying in the past 3 months?: No  There is no height or weight on file to calculate BMI. (!) Abnormal  Obesity Interventions:  Bmi handouts                               Objective      Patient-Reported Vitals  No data recorded            No Known Allergies  Prior to Visit Medications    Medication Sig Taking? Authorizing Provider   estrogens conjugated (PREMARIN) 0.625 MG/GM CREA vaginal cream Place 1.5 g vaginally every 7 days Yes Neela Montero MD   hydrocortisone 2.5 % cream Apply topically 2 times daily. Yes Neela Montero MD   losartan (COZAAR) 100 MG tablet Take 1 tablet by mouth daily TAKE 1 TABLET BY MOUTH EVERY DAY Yes Neela Montero MD   temazepam (RESTORIL) 15 MG capsule Take 2 capsules by mouth nightly as needed for Sleep for up to 180 days.  2 poqhs Max Daily Amount: 30 mg Yes Neela Montero MD   rosuvastatin (CRESTOR) 10 MG tablet Take 1 tablet by

## 2024-01-08 ENCOUNTER — TELEPHONE (OUTPATIENT)
Dept: FAMILY MEDICINE CLINIC | Facility: CLINIC | Age: 69
End: 2024-01-08

## 2024-01-08 DIAGNOSIS — U07.1 COVID-19: Primary | ICD-10-CM

## 2024-01-08 NOTE — TELEPHONE ENCOUNTER
Pt called in stating they tested postive for covid today and are requesting an rx for paxlovid  Pharmacy is [facility]/pharmacy #[id number] - [place], [place] - [address]   Thank you   Mana

## 2024-02-20 ENCOUNTER — OFFICE VISIT (OUTPATIENT)
Dept: FAMILY MEDICINE CLINIC | Facility: CLINIC | Age: 69
End: 2024-02-20
Payer: MEDICARE

## 2024-02-20 VITALS
OXYGEN SATURATION: 97 % | HEART RATE: 73 BPM | WEIGHT: 172 LBS | DIASTOLIC BLOOD PRESSURE: 72 MMHG | SYSTOLIC BLOOD PRESSURE: 120 MMHG | HEIGHT: 62 IN | RESPIRATION RATE: 18 BRPM | TEMPERATURE: 98 F | BODY MASS INDEX: 31.65 KG/M2

## 2024-02-20 DIAGNOSIS — N95.2 VAGINAL ATROPHY: ICD-10-CM

## 2024-02-20 DIAGNOSIS — E66.09 CLASS 1 OBESITY DUE TO EXCESS CALORIES WITH SERIOUS COMORBIDITY AND BODY MASS INDEX (BMI) OF 31.0 TO 31.9 IN ADULT: ICD-10-CM

## 2024-02-20 DIAGNOSIS — E78.00 PURE HYPERCHOLESTEROLEMIA: ICD-10-CM

## 2024-02-20 DIAGNOSIS — I10 ESSENTIAL HYPERTENSION, BENIGN: Primary | ICD-10-CM

## 2024-02-20 DIAGNOSIS — F51.01 PRIMARY INSOMNIA: ICD-10-CM

## 2024-02-20 DIAGNOSIS — F13.99 SEDATIVE, HYPNOTIC OR ANXIOLYTIC USE, UNSPECIFIED WITH UNSPECIFIED SEDATIVE, HYPNOTIC OR ANXIOLYTIC-INDUCED DISORDER (HCC): ICD-10-CM

## 2024-02-20 DIAGNOSIS — I70.0 ATHEROSCLEROSIS OF AORTA (HCC): ICD-10-CM

## 2024-02-20 DIAGNOSIS — D22.9 MULTIPLE ATYPICAL SKIN MOLES: ICD-10-CM

## 2024-02-20 DIAGNOSIS — Z13.31 DEPRESSION SCREENING NEGATIVE: ICD-10-CM

## 2024-02-20 LAB
ALBUMIN SERPL-MCNC: 4 G/DL (ref 3.2–4.6)
ALBUMIN/GLOB SERPL: 1.1 (ref 0.4–1.6)
ALP SERPL-CCNC: 58 U/L (ref 50–136)
ALT SERPL-CCNC: 32 U/L (ref 12–65)
ANION GAP SERPL CALC-SCNC: 3 MMOL/L (ref 2–11)
AST SERPL-CCNC: 20 U/L (ref 15–37)
BASOPHILS # BLD: 0.1 K/UL (ref 0–0.2)
BASOPHILS NFR BLD: 1 % (ref 0–2)
BILIRUB SERPL-MCNC: 0.6 MG/DL (ref 0.2–1.1)
BUN SERPL-MCNC: 17 MG/DL (ref 8–23)
CALCIUM SERPL-MCNC: 9.3 MG/DL (ref 8.3–10.4)
CHLORIDE SERPL-SCNC: 109 MMOL/L (ref 103–113)
CHOLEST SERPL-MCNC: 159 MG/DL
CO2 SERPL-SCNC: 28 MMOL/L (ref 21–32)
CREAT SERPL-MCNC: 1 MG/DL (ref 0.6–1)
DIFFERENTIAL METHOD BLD: NORMAL
EOSINOPHIL # BLD: 0.1 K/UL (ref 0–0.8)
EOSINOPHIL NFR BLD: 2 % (ref 0.5–7.8)
ERYTHROCYTE [DISTWIDTH] IN BLOOD BY AUTOMATED COUNT: 12 % (ref 11.9–14.6)
GLOBULIN SER CALC-MCNC: 3.6 G/DL (ref 2.8–4.5)
GLUCOSE SERPL-MCNC: 94 MG/DL (ref 65–100)
HCT VFR BLD AUTO: 42.6 % (ref 35.8–46.3)
HDLC SERPL-MCNC: 54 MG/DL (ref 40–60)
HDLC SERPL: 2.9
HGB BLD-MCNC: 14.2 G/DL (ref 11.7–15.4)
IMM GRANULOCYTES # BLD AUTO: 0 K/UL (ref 0–0.5)
IMM GRANULOCYTES NFR BLD AUTO: 0 % (ref 0–5)
LDLC SERPL CALC-MCNC: 90 MG/DL
LYMPHOCYTES # BLD: 2.3 K/UL (ref 0.5–4.6)
LYMPHOCYTES NFR BLD: 32 % (ref 13–44)
MCH RBC QN AUTO: 31.6 PG (ref 26.1–32.9)
MCHC RBC AUTO-ENTMCNC: 33.3 G/DL (ref 31.4–35)
MCV RBC AUTO: 94.7 FL (ref 82–102)
MONOCYTES # BLD: 0.6 K/UL (ref 0.1–1.3)
MONOCYTES NFR BLD: 8 % (ref 4–12)
NEUTS SEG # BLD: 4.3 K/UL (ref 1.7–8.2)
NEUTS SEG NFR BLD: 57 % (ref 43–78)
NRBC # BLD: 0 K/UL (ref 0–0.2)
PLATELET # BLD AUTO: 285 K/UL (ref 150–450)
PMV BLD AUTO: 9.9 FL (ref 9.4–12.3)
POTASSIUM SERPL-SCNC: 4.1 MMOL/L (ref 3.5–5.1)
PROT SERPL-MCNC: 7.6 G/DL (ref 6.3–8.2)
RBC # BLD AUTO: 4.5 M/UL (ref 4.05–5.2)
SODIUM SERPL-SCNC: 140 MMOL/L (ref 136–146)
TRIGL SERPL-MCNC: 75 MG/DL (ref 35–150)
VLDLC SERPL CALC-MCNC: 15 MG/DL (ref 6–23)
WBC # BLD AUTO: 7.4 K/UL (ref 4.3–11.1)

## 2024-02-20 PROCEDURE — 3017F COLORECTAL CA SCREEN DOC REV: CPT | Performed by: FAMILY MEDICINE

## 2024-02-20 PROCEDURE — G8427 DOCREV CUR MEDS BY ELIG CLIN: HCPCS | Performed by: FAMILY MEDICINE

## 2024-02-20 PROCEDURE — 3074F SYST BP LT 130 MM HG: CPT | Performed by: FAMILY MEDICINE

## 2024-02-20 PROCEDURE — G8399 PT W/DXA RESULTS DOCUMENT: HCPCS | Performed by: FAMILY MEDICINE

## 2024-02-20 PROCEDURE — 3078F DIAST BP <80 MM HG: CPT | Performed by: FAMILY MEDICINE

## 2024-02-20 PROCEDURE — 1036F TOBACCO NON-USER: CPT | Performed by: FAMILY MEDICINE

## 2024-02-20 PROCEDURE — 1123F ACP DISCUSS/DSCN MKR DOCD: CPT | Performed by: FAMILY MEDICINE

## 2024-02-20 PROCEDURE — G8417 CALC BMI ABV UP PARAM F/U: HCPCS | Performed by: FAMILY MEDICINE

## 2024-02-20 PROCEDURE — G8484 FLU IMMUNIZE NO ADMIN: HCPCS | Performed by: FAMILY MEDICINE

## 2024-02-20 PROCEDURE — 99214 OFFICE O/P EST MOD 30 MIN: CPT | Performed by: FAMILY MEDICINE

## 2024-02-20 PROCEDURE — 1090F PRES/ABSN URINE INCON ASSESS: CPT | Performed by: FAMILY MEDICINE

## 2024-02-20 RX ORDER — CONJUGATED ESTROGENS 0.62 MG/G
1.5 CREAM VAGINAL
Qty: 85 G | Refills: 3 | Status: SHIPPED | OUTPATIENT
Start: 2024-02-20

## 2024-02-20 RX ORDER — CONJUGATED ESTROGENS 0.62 MG/G
1.5 CREAM VAGINAL
Qty: 42.5 G | Refills: 3 | Status: SHIPPED | OUTPATIENT
Start: 2024-02-20 | End: 2024-02-20 | Stop reason: SDUPTHER

## 2024-02-20 RX ORDER — MEDROXYPROGESTERONE ACETATE 2.5 MG/1
2.5 TABLET ORAL
Qty: 12 TABLET | Refills: 0 | Status: SHIPPED | OUTPATIENT
Start: 2024-02-20

## 2024-02-20 RX ORDER — TEMAZEPAM 15 MG/1
30 CAPSULE ORAL NIGHTLY PRN
Qty: 180 CAPSULE | Refills: 1 | Status: SHIPPED | OUTPATIENT
Start: 2024-02-20 | End: 2024-08-18

## 2024-02-20 RX ORDER — LOSARTAN POTASSIUM 100 MG/1
100 TABLET ORAL DAILY
Qty: 90 TABLET | Refills: 3 | Status: SHIPPED | OUTPATIENT
Start: 2024-02-20

## 2024-02-20 RX ORDER — ROSUVASTATIN CALCIUM 10 MG/1
10 TABLET, COATED ORAL NIGHTLY
Qty: 90 TABLET | Refills: 3 | Status: SHIPPED | OUTPATIENT
Start: 2024-02-20

## 2024-02-20 ASSESSMENT — PATIENT HEALTH QUESTIONNAIRE - PHQ9
1. LITTLE INTEREST OR PLEASURE IN DOING THINGS: 0
SUM OF ALL RESPONSES TO PHQ9 QUESTIONS 1 & 2: 0
SUM OF ALL RESPONSES TO PHQ QUESTIONS 1-9: 0
2. FEELING DOWN, DEPRESSED OR HOPELESS: 0
SUM OF ALL RESPONSES TO PHQ QUESTIONS 1-9: 0

## 2024-02-20 ASSESSMENT — ENCOUNTER SYMPTOMS
VOMITING: 0
SHORTNESS OF BREATH: 0
DIARRHEA: 0
NAUSEA: 0
COUGH: 0

## 2024-02-20 NOTE — PROGRESS NOTES
Paula Zhang (:  1955) is a 68 y.o. female,Established patient, here for evaluation of the following chief complaint(s):  Follow-up (Chronic care follow up. Fasting. ), Hypertension, and Cholesterol Problem         ASSESSMENT/PLAN:  1. Essential hypertension, benign- well-controlled/stable  -     CBC with Auto Differential; Future  -     Comprehensive Metabolic Panel; Future  -     losartan (COZAAR) 100 MG tablet; Take 1 tablet by mouth daily TAKE 1 TABLET BY MOUTH EVERY DAY, Disp-90 tablet, R-3Normal  2. Pure hypercholesterolemia-stable, repeat labs  -     Comprehensive Metabolic Panel; Future  -     Lipid Panel; Future  -     rosuvastatin (CRESTOR) 10 MG tablet; Take 1 tablet by mouth at bedtime, Disp-90 tablet, R-3Normal  3. Atherosclerosis of aorta (HCC)-stable, continue STATIN  -     rosuvastatin (CRESTOR) 10 MG tablet; Take 1 tablet by mouth at bedtime, Disp-90 tablet, R-3Normal  4. Vaginal atrophy-stable, refilled Premarin cream and Provera  -     estrogens conjugated (PREMARIN) 0.625 MG/GM CREA vaginal cream; Place 1.5 g vaginally every 72 hours, Vaginal, EVERY 72 HOURS Starting 2024, Disp-85 g, R-3, Normal  -     medroxyPROGESTERone (PROVERA) 2.5 MG tablet; Take 1 tablet by mouth every 30 days, Disp-12 tablet, R-0Normal  5. Class 1 obesity due to excess calories with serious comorbidity and body mass index (BMI) of 31.0 to 31.9 in adult  Assessment & Plan:   Uncontrolled, lifestyle modifications recommended  BMI handout  6. Multiple atypical skin moles- refer to derm for eval  -     AFL - Aime Renner MD, PA  7. Primary insomnia-stable, refill med, no daytime sedation  Assessment & Plan:   Borderline controlled, continue current medications  Orders:  -     temazepam (RESTORIL) 15 MG capsule; Take 2 capsules by mouth nightly as needed for Sleep for up to 180 days. 2 poqhs Max Daily Amount: 30 mg, Disp-180 capsule, R-1Normal  8. Sedative, hypnotic or anxiolytic use, unspecified with

## 2024-04-23 ENCOUNTER — OFFICE VISIT (OUTPATIENT)
Dept: FAMILY MEDICINE CLINIC | Facility: CLINIC | Age: 69
End: 2024-04-23
Payer: MEDICARE

## 2024-04-23 VITALS
SYSTOLIC BLOOD PRESSURE: 130 MMHG | RESPIRATION RATE: 18 BRPM | WEIGHT: 175 LBS | HEIGHT: 62 IN | BODY MASS INDEX: 32.2 KG/M2 | OXYGEN SATURATION: 97 % | HEART RATE: 77 BPM | DIASTOLIC BLOOD PRESSURE: 82 MMHG | TEMPERATURE: 98 F

## 2024-04-23 DIAGNOSIS — Z12.4 CERVICAL CANCER SCREENING: ICD-10-CM

## 2024-04-23 DIAGNOSIS — N95.2 VAGINAL ATROPHY: Primary | ICD-10-CM

## 2024-04-23 PROCEDURE — 3017F COLORECTAL CA SCREEN DOC REV: CPT | Performed by: FAMILY MEDICINE

## 2024-04-23 PROCEDURE — 1123F ACP DISCUSS/DSCN MKR DOCD: CPT | Performed by: FAMILY MEDICINE

## 2024-04-23 PROCEDURE — 3079F DIAST BP 80-89 MM HG: CPT | Performed by: FAMILY MEDICINE

## 2024-04-23 PROCEDURE — G8427 DOCREV CUR MEDS BY ELIG CLIN: HCPCS | Performed by: FAMILY MEDICINE

## 2024-04-23 PROCEDURE — G8399 PT W/DXA RESULTS DOCUMENT: HCPCS | Performed by: FAMILY MEDICINE

## 2024-04-23 PROCEDURE — 1036F TOBACCO NON-USER: CPT | Performed by: FAMILY MEDICINE

## 2024-04-23 PROCEDURE — 1090F PRES/ABSN URINE INCON ASSESS: CPT | Performed by: FAMILY MEDICINE

## 2024-04-23 PROCEDURE — 99213 OFFICE O/P EST LOW 20 MIN: CPT | Performed by: FAMILY MEDICINE

## 2024-04-23 PROCEDURE — G8417 CALC BMI ABV UP PARAM F/U: HCPCS | Performed by: FAMILY MEDICINE

## 2024-04-23 PROCEDURE — 3075F SYST BP GE 130 - 139MM HG: CPT | Performed by: FAMILY MEDICINE

## 2024-04-23 RX ORDER — CONJUGATED ESTROGENS 0.62 MG/G
1.5 CREAM VAGINAL
Qty: 85 G | Refills: 3 | Status: SHIPPED | OUTPATIENT
Start: 2024-04-24

## 2024-04-23 SDOH — ECONOMIC STABILITY: INCOME INSECURITY: HOW HARD IS IT FOR YOU TO PAY FOR THE VERY BASICS LIKE FOOD, HOUSING, MEDICAL CARE, AND HEATING?: PATIENT DECLINED

## 2024-04-23 SDOH — ECONOMIC STABILITY: FOOD INSECURITY: WITHIN THE PAST 12 MONTHS, THE FOOD YOU BOUGHT JUST DIDN'T LAST AND YOU DIDN'T HAVE MONEY TO GET MORE.: PATIENT DECLINED

## 2024-04-23 SDOH — ECONOMIC STABILITY: HOUSING INSECURITY
IN THE LAST 12 MONTHS, WAS THERE A TIME WHEN YOU DID NOT HAVE A STEADY PLACE TO SLEEP OR SLEPT IN A SHELTER (INCLUDING NOW)?: PATIENT DECLINED

## 2024-04-23 SDOH — ECONOMIC STABILITY: FOOD INSECURITY: WITHIN THE PAST 12 MONTHS, YOU WORRIED THAT YOUR FOOD WOULD RUN OUT BEFORE YOU GOT MONEY TO BUY MORE.: PATIENT DECLINED

## 2024-04-23 ASSESSMENT — ENCOUNTER SYMPTOMS
NAUSEA: 0
COUGH: 0
SHORTNESS OF BREATH: 0
VOMITING: 0
DIARRHEA: 0

## 2024-04-23 ASSESSMENT — PATIENT HEALTH QUESTIONNAIRE - PHQ9
SUM OF ALL RESPONSES TO PHQ QUESTIONS 1-9: 0
1. LITTLE INTEREST OR PLEASURE IN DOING THINGS: NOT AT ALL
2. FEELING DOWN, DEPRESSED OR HOPELESS: NOT AT ALL
SUM OF ALL RESPONSES TO PHQ9 QUESTIONS 1 & 2: 0

## 2024-04-24 NOTE — PROGRESS NOTES
Paula Zahng (:  1955) is a 68 y.o. female,Established patient, here for evaluation of the following chief complaint(s):  Other (Pelvic exam and pap HAVING ISSUES WITH VAGINAL/INTROITUS ATROPHY)      Assessment & Plan   1. Vaginal atrophy- WILL INCREASE PREMARIN CREAM TO 3X/WEEK  -     estrogens conjugated (PREMARIN) 0.625 MG/GM CREA vaginal cream; Place 1.5 g vaginally three times a week, Vaginal, THREE TIMES WEEKLY (MONDAY, WEDNESDAY, FRIDAY) Starting 2024, Disp-85 g, R-3, Normal  2. Cervical cancer screening- CHECK pap TODAY, NO VISIBLE SIGN OF SUSPICIOUS LESIONS  -     PAP IGP,Aptima HPV,CtNg Age Gdln ([facility] ONLY); Future      Return in about 5 weeks (around 2024) for fasting chronic care.       Subjective   Other  This is a chronic problem. The current episode started more than 1 year ago. The problem occurs constantly. The problem has been unchanged. Pertinent negatives include no chest pain, chills, coughing, fatigue, nausea or vomiting.     Review of Systems   Constitutional:  Negative for chills and fatigue.   Respiratory:  Negative for cough and shortness of breath.    Cardiovascular:  Negative for chest pain and leg swelling.   Gastrointestinal:  Negative for diarrhea, nausea and vomiting.        Objective   Physical Exam  Vitals and nursing note reviewed.   Constitutional:       General: She is not in acute distress.     Appearance: Normal appearance. She is obese.   HENT:      Head: Normocephalic and atraumatic.      Nose: Nose normal.      Mouth/Throat:      Pharynx: Oropharynx is clear.   Eyes:      Extraocular Movements: Extraocular movements intact.      Conjunctiva/sclera: Conjunctivae normal.   Cardiovascular:      Rate and Rhythm: Normal rate and regular rhythm.      Pulses: Normal pulses.      Heart sounds: Normal heart sounds.   Pulmonary:      Effort: Pulmonary effort is normal.      Breath sounds: Normal breath sounds.   Genitourinary:     General: Normal vulva.

## 2024-04-30 LAB
C TRACH RRNA CVX QL NAA+PROBE: NEGATIVE
CYTOLOGIST CVX/VAG CYTO: NORMAL
CYTOLOGY CVX/VAG DOC THIN PREP: NORMAL
HPV APTIMA: NEGATIVE
Lab: NORMAL
N GONORRHOEA RRNA CVX QL NAA+PROBE: NEGATIVE
PATH REPORT.FINAL DX SPEC: NORMAL
STAT OF ADQ CVX/VAG CYTO-IMP: NORMAL

## 2024-05-29 ENCOUNTER — OFFICE VISIT (OUTPATIENT)
Dept: FAMILY MEDICINE CLINIC | Facility: CLINIC | Age: 69
End: 2024-05-29
Payer: MEDICARE

## 2024-05-29 VITALS
WEIGHT: 176 LBS | OXYGEN SATURATION: 97 % | SYSTOLIC BLOOD PRESSURE: 130 MMHG | BODY MASS INDEX: 32.39 KG/M2 | DIASTOLIC BLOOD PRESSURE: 76 MMHG | HEIGHT: 62 IN | HEART RATE: 82 BPM | TEMPERATURE: 98 F | RESPIRATION RATE: 18 BRPM

## 2024-05-29 DIAGNOSIS — E66.09 CLASS 1 OBESITY DUE TO EXCESS CALORIES WITH SERIOUS COMORBIDITY AND BODY MASS INDEX (BMI) OF 31.0 TO 31.9 IN ADULT: ICD-10-CM

## 2024-05-29 DIAGNOSIS — N95.2 VAGINAL ATROPHY: ICD-10-CM

## 2024-05-29 DIAGNOSIS — I70.0 ATHEROSCLEROSIS OF AORTA (HCC): ICD-10-CM

## 2024-05-29 DIAGNOSIS — I10 ESSENTIAL HYPERTENSION, BENIGN: Primary | ICD-10-CM

## 2024-05-29 DIAGNOSIS — E78.00 PURE HYPERCHOLESTEROLEMIA: ICD-10-CM

## 2024-05-29 DIAGNOSIS — F13.99 SEDATIVE, HYPNOTIC OR ANXIOLYTIC USE, UNSPECIFIED WITH UNSPECIFIED SEDATIVE, HYPNOTIC OR ANXIOLYTIC-INDUCED DISORDER (HCC): ICD-10-CM

## 2024-05-29 DIAGNOSIS — F51.01 PRIMARY INSOMNIA: ICD-10-CM

## 2024-05-29 PROCEDURE — G8399 PT W/DXA RESULTS DOCUMENT: HCPCS | Performed by: FAMILY MEDICINE

## 2024-05-29 PROCEDURE — 1036F TOBACCO NON-USER: CPT | Performed by: FAMILY MEDICINE

## 2024-05-29 PROCEDURE — 99214 OFFICE O/P EST MOD 30 MIN: CPT | Performed by: FAMILY MEDICINE

## 2024-05-29 PROCEDURE — 3078F DIAST BP <80 MM HG: CPT | Performed by: FAMILY MEDICINE

## 2024-05-29 PROCEDURE — 1090F PRES/ABSN URINE INCON ASSESS: CPT | Performed by: FAMILY MEDICINE

## 2024-05-29 PROCEDURE — G8427 DOCREV CUR MEDS BY ELIG CLIN: HCPCS | Performed by: FAMILY MEDICINE

## 2024-05-29 PROCEDURE — 3017F COLORECTAL CA SCREEN DOC REV: CPT | Performed by: FAMILY MEDICINE

## 2024-05-29 PROCEDURE — 1123F ACP DISCUSS/DSCN MKR DOCD: CPT | Performed by: FAMILY MEDICINE

## 2024-05-29 PROCEDURE — 3075F SYST BP GE 130 - 139MM HG: CPT | Performed by: FAMILY MEDICINE

## 2024-05-29 PROCEDURE — G2211 COMPLEX E/M VISIT ADD ON: HCPCS | Performed by: FAMILY MEDICINE

## 2024-05-29 PROCEDURE — G8417 CALC BMI ABV UP PARAM F/U: HCPCS | Performed by: FAMILY MEDICINE

## 2024-05-29 RX ORDER — TEMAZEPAM 15 MG/1
30 CAPSULE ORAL NIGHTLY PRN
Qty: 180 CAPSULE | Refills: 1 | Status: SHIPPED | OUTPATIENT
Start: 2024-05-29 | End: 2024-11-25

## 2024-05-29 RX ORDER — ROSUVASTATIN CALCIUM 10 MG/1
10 TABLET, COATED ORAL NIGHTLY
Qty: 90 TABLET | Refills: 3 | Status: SHIPPED | OUTPATIENT
Start: 2024-05-29

## 2024-05-29 RX ORDER — LOSARTAN POTASSIUM 100 MG/1
100 TABLET ORAL DAILY
Qty: 90 TABLET | Refills: 3 | Status: SHIPPED | OUTPATIENT
Start: 2024-05-29

## 2024-05-29 RX ORDER — CONJUGATED ESTROGENS 0.62 MG/G
1.5 CREAM VAGINAL
Qty: 85 G | Refills: 3 | Status: SHIPPED | OUTPATIENT
Start: 2024-05-29

## 2024-05-29 ASSESSMENT — PATIENT HEALTH QUESTIONNAIRE - PHQ9
SUM OF ALL RESPONSES TO PHQ QUESTIONS 1-9: 0
1. LITTLE INTEREST OR PLEASURE IN DOING THINGS: NOT AT ALL
2. FEELING DOWN, DEPRESSED OR HOPELESS: NOT AT ALL
SUM OF ALL RESPONSES TO PHQ QUESTIONS 1-9: 0
SUM OF ALL RESPONSES TO PHQ9 QUESTIONS 1 & 2: 0

## 2024-05-29 ASSESSMENT — ENCOUNTER SYMPTOMS
SHORTNESS OF BREATH: 0
NAUSEA: 0
DIARRHEA: 0
VOMITING: 0
COUGH: 0

## 2024-05-29 NOTE — PROGRESS NOTES
Paula Zhang (:  1955) is a 68 y.o. female,Established patient, here for evaluation of the following chief complaint(s):  Follow-up (Chronic care follow up. Fasting. ), Hypertension (Well-controlled), and Cholesterol Problem (Repeat fasting labs)      Assessment & Plan   1. Essential hypertension, benign- well-controlled/stable, check labs and refill med  -     CBC with Auto Differential; Future  -     Comprehensive Metabolic Panel; Future  -     losartan (COZAAR) 100 MG tablet; Take 1 tablet by mouth daily TAKE 1 TABLET BY MOUTH EVERY DAY, Disp-90 tablet, R-3Normal  2. Pure hypercholesterolemia-stable, repeat labs  -     Comprehensive Metabolic Panel; Future  -     Lipid Panel; Future  -     rosuvastatin (CRESTOR) 10 MG tablet; Take 1 tablet by mouth at bedtime, Disp-90 tablet, R-3Normal  3. Primary insomnia-stable on med, no daytime sedation, RF today  -     temazepam (RESTORIL) 15 MG capsule; Take 2 capsules by mouth nightly as needed for Sleep for up to 180 days. 2 poqhs Max Daily Amount: 30 mg, Disp-180 capsule, R-1Normal    - I have reviewed the patient’s controlled substance prescription history, as maintained in the [place] prescription monitoring program, so that the prescription(s) for a  controlled substance can be given.  - Patient is aware of addictive potential of medication.   - Patient is aware of respiratory suppression and is not experiencing any sedation with medication.   - Patient denies sedation or other side effects from medication  - Patient is instructed on compliance with dosing schedule and acknowledges that no early refill will be provided in the event of non-compliance, theft or loss of medication.   - Patient is aware that they may be subject to random drug testing and pill counts.   4. Sedative, hypnotic or anxiolytic use, unspecified with unspecified sedative, hypnotic or anxiolytic-induced disorder (HCC)-stable   -     temazepam (RESTORIL) 15 MG capsule; Take 2

## 2024-05-30 LAB
ALBUMIN SERPL-MCNC: 4.4 G/DL (ref 3.2–4.6)
ALBUMIN/GLOB SERPL: 1.5 (ref 1–1.9)
ALP SERPL-CCNC: 61 U/L (ref 35–104)
ALT SERPL-CCNC: 25 U/L (ref 12–65)
ANION GAP SERPL CALC-SCNC: 14 MMOL/L (ref 9–18)
AST SERPL-CCNC: 29 U/L (ref 15–37)
BASOPHILS # BLD: 0.1 K/UL (ref 0–0.2)
BASOPHILS NFR BLD: 1 % (ref 0–2)
BILIRUB SERPL-MCNC: 0.6 MG/DL (ref 0–1.2)
BUN SERPL-MCNC: 14 MG/DL (ref 8–23)
CALCIUM SERPL-MCNC: 9.5 MG/DL (ref 8.8–10.2)
CHLORIDE SERPL-SCNC: 103 MMOL/L (ref 98–107)
CHOLEST SERPL-MCNC: 156 MG/DL (ref 0–200)
CO2 SERPL-SCNC: 22 MMOL/L (ref 20–28)
CREAT SERPL-MCNC: 0.82 MG/DL (ref 0.6–1.1)
DIFFERENTIAL METHOD BLD: ABNORMAL
EOSINOPHIL # BLD: 0 K/UL (ref 0–0.8)
EOSINOPHIL NFR BLD: 0 % (ref 0.5–7.8)
ERYTHROCYTE [DISTWIDTH] IN BLOOD BY AUTOMATED COUNT: 11.8 % (ref 11.9–14.6)
GLOBULIN SER CALC-MCNC: 2.9 G/DL (ref 2.3–3.5)
GLUCOSE SERPL-MCNC: 96 MG/DL (ref 70–99)
HCT VFR BLD AUTO: 42.8 % (ref 35.8–46.3)
HDLC SERPL-MCNC: 49 MG/DL (ref 40–60)
HDLC SERPL: 3.2 (ref 0–5)
HGB BLD-MCNC: 14.5 G/DL (ref 11.7–15.4)
IMM GRANULOCYTES # BLD AUTO: 0 K/UL (ref 0–0.5)
IMM GRANULOCYTES NFR BLD AUTO: 0 % (ref 0–5)
LDLC SERPL CALC-MCNC: 89 MG/DL (ref 0–100)
LYMPHOCYTES # BLD: 1.5 K/UL (ref 0.5–4.6)
LYMPHOCYTES NFR BLD: 17 % (ref 13–44)
MCH RBC QN AUTO: 32.2 PG (ref 26.1–32.9)
MCHC RBC AUTO-ENTMCNC: 33.9 G/DL (ref 31.4–35)
MCV RBC AUTO: 94.9 FL (ref 82–102)
MONOCYTES # BLD: 0.9 K/UL (ref 0.1–1.3)
MONOCYTES NFR BLD: 9 % (ref 4–12)
NEUTS SEG # BLD: 6.8 K/UL (ref 1.7–8.2)
NEUTS SEG NFR BLD: 73 % (ref 43–78)
NRBC # BLD: 0 K/UL (ref 0–0.2)
PLATELET # BLD AUTO: 251 K/UL (ref 150–450)
PMV BLD AUTO: 10 FL (ref 9.4–12.3)
POTASSIUM SERPL-SCNC: 4.1 MMOL/L (ref 3.5–5.1)
PROT SERPL-MCNC: 7.3 G/DL (ref 6.3–8.2)
RBC # BLD AUTO: 4.51 M/UL (ref 4.05–5.2)
SODIUM SERPL-SCNC: 139 MMOL/L (ref 136–145)
TRIGL SERPL-MCNC: 91 MG/DL (ref 0–150)
VLDLC SERPL CALC-MCNC: 18 MG/DL (ref 6–23)
WBC # BLD AUTO: 9.3 K/UL (ref 4.3–11.1)

## 2024-07-16 ENCOUNTER — TELEPHONE (OUTPATIENT)
Dept: FAMILY MEDICINE CLINIC | Facility: CLINIC | Age: 69
End: 2024-07-16

## 2024-07-16 DIAGNOSIS — N95.2 VAGINAL ATROPHY: Primary | ICD-10-CM

## 2024-07-16 NOTE — TELEPHONE ENCOUNTER
----- Message from Guillermo Moreira sent at 7/16/2024  9:55 AM EDT -----  Regarding: [facility] Referral Request  [facility] Referral Request    Reason for referral request: Specialty Provider    Specialist/Lab/Test patient is requesting (if known):Gynecologist    Specialist Phone Number (if applicable):    Additional Information The patient wants to get the name of the doctor because they want to schedule their own appointment at their available time.  --------------------------------------------------------------------------------------------------------------------------    Relationship to Patient: Self     Call Back Information: OK to leave message on voicemail  Preferred Call Back Number: [facility] [phone]

## 2024-07-23 NOTE — PROGRESS NOTES
HPI  Paula Zhang is a 69 y.o. female seen for an area at the introitus at 6 o clock that causes pain during intercourse.  She has been on Premarin cream for years but recently her PCP increased the dosage to 1.5 mg 3 x per week.  This has not helped.  She feels like the area in question is a split in the skin.      Past Medical History, Past Surgical History, Family history, Social History, and Medications were all reviewed with the patient today and updated as necessary.     Current Outpatient Medications   Medication Sig    clobetasol (TEMOVATE) 0.05 % cream Apply topically 3 times daily.    estradiol (ESTRACE VAGINAL) 0.1 MG/GM vaginal cream 1 gram intravaginally twice a week    temazepam (RESTORIL) 15 MG capsule Take 2 capsules by mouth nightly as needed for Sleep for up to 180 days. 2 poqhs Max Daily Amount: 30 mg    losartan (COZAAR) 100 MG tablet Take 1 tablet by mouth daily TAKE 1 TABLET BY MOUTH EVERY DAY    rosuvastatin (CRESTOR) 10 MG tablet Take 1 tablet by mouth at bedtime    medroxyPROGESTERone (PROVERA) 2.5 MG tablet Take 1 tablet by mouth every 30 days    hydrocortisone 2.5 % cream Apply topically 2 times daily.     No current facility-administered medications for this visit.     No Known Allergies  Past Medical History:   Diagnosis Date    Anxiety state, unspecified     Carpal tunnel syndrome     Essential hypertension, benign     Insomnia, unspecified     Pain in joint, multiple sites     Plantar fascial fibromatosis     Postmenopausal atrophic vaginitis     Unspecified hypothyroidism      Past Surgical History:   Procedure Laterality Date    CARPAL TUNNEL RELEASE Bilateral 8/2010    Carpal tunnel surgery.     CATARACT REMOVAL  8/06    Cataract surgery      Family History   Problem Relation Age of Onset    Breast Cancer Mother 47    Hypertension Mother     Coronary Art Dis Father         onset 50's, lived to be 91 with dementia    Arrhythmia Father     Dementia Father     Pacemaker Father

## 2024-07-29 ENCOUNTER — OFFICE VISIT (OUTPATIENT)
Dept: OBGYN CLINIC | Age: 69
End: 2024-07-29
Payer: MEDICARE

## 2024-07-29 VITALS
WEIGHT: 178 LBS | BODY MASS INDEX: 32.76 KG/M2 | HEIGHT: 62 IN | DIASTOLIC BLOOD PRESSURE: 72 MMHG | SYSTOLIC BLOOD PRESSURE: 110 MMHG

## 2024-07-29 DIAGNOSIS — R23.4 FISSURE IN SKIN: ICD-10-CM

## 2024-07-29 DIAGNOSIS — N94.10 DYSPAREUNIA IN FEMALE: Primary | ICD-10-CM

## 2024-07-29 DIAGNOSIS — N95.2 VAGINAL ATROPHY: ICD-10-CM

## 2024-07-29 DIAGNOSIS — N90.89 VULVAR LESION: ICD-10-CM

## 2024-07-29 PROCEDURE — 56605 BIOPSY OF VULVA/PERINEUM: CPT | Performed by: OBSTETRICS & GYNECOLOGY

## 2024-07-29 PROCEDURE — 3074F SYST BP LT 130 MM HG: CPT | Performed by: OBSTETRICS & GYNECOLOGY

## 2024-07-29 PROCEDURE — 1123F ACP DISCUSS/DSCN MKR DOCD: CPT | Performed by: OBSTETRICS & GYNECOLOGY

## 2024-07-29 PROCEDURE — 3017F COLORECTAL CA SCREEN DOC REV: CPT | Performed by: OBSTETRICS & GYNECOLOGY

## 2024-07-29 PROCEDURE — G8427 DOCREV CUR MEDS BY ELIG CLIN: HCPCS | Performed by: OBSTETRICS & GYNECOLOGY

## 2024-07-29 PROCEDURE — G8417 CALC BMI ABV UP PARAM F/U: HCPCS | Performed by: OBSTETRICS & GYNECOLOGY

## 2024-07-29 PROCEDURE — 1090F PRES/ABSN URINE INCON ASSESS: CPT | Performed by: OBSTETRICS & GYNECOLOGY

## 2024-07-29 PROCEDURE — 1036F TOBACCO NON-USER: CPT | Performed by: OBSTETRICS & GYNECOLOGY

## 2024-07-29 PROCEDURE — 3078F DIAST BP <80 MM HG: CPT | Performed by: OBSTETRICS & GYNECOLOGY

## 2024-07-29 PROCEDURE — 99203 OFFICE O/P NEW LOW 30 MIN: CPT | Performed by: OBSTETRICS & GYNECOLOGY

## 2024-07-29 PROCEDURE — G8399 PT W/DXA RESULTS DOCUMENT: HCPCS | Performed by: OBSTETRICS & GYNECOLOGY

## 2024-07-29 RX ORDER — CLOBETASOL PROPIONATE 0.5 MG/G
CREAM TOPICAL
Qty: 30 G | Refills: 3 | Status: SHIPPED | OUTPATIENT
Start: 2024-07-29

## 2024-07-29 RX ORDER — ESTRADIOL 0.1 MG/G
CREAM VAGINAL
Qty: 42.5 G | Refills: 5 | Status: SHIPPED | OUTPATIENT
Start: 2024-07-29

## 2024-08-06 ENCOUNTER — OFFICE VISIT (OUTPATIENT)
Dept: OBGYN CLINIC | Age: 69
End: 2024-08-06
Payer: MEDICARE

## 2024-08-06 VITALS
HEIGHT: 62 IN | DIASTOLIC BLOOD PRESSURE: 74 MMHG | BODY MASS INDEX: 32.76 KG/M2 | WEIGHT: 178 LBS | SYSTOLIC BLOOD PRESSURE: 124 MMHG

## 2024-08-06 DIAGNOSIS — R23.4 FISSURE IN SKIN: Primary | ICD-10-CM

## 2024-08-06 PROCEDURE — 99214 OFFICE O/P EST MOD 30 MIN: CPT | Performed by: OBSTETRICS & GYNECOLOGY

## 2024-08-06 PROCEDURE — 1090F PRES/ABSN URINE INCON ASSESS: CPT | Performed by: OBSTETRICS & GYNECOLOGY

## 2024-08-06 PROCEDURE — 3078F DIAST BP <80 MM HG: CPT | Performed by: OBSTETRICS & GYNECOLOGY

## 2024-08-06 PROCEDURE — 1123F ACP DISCUSS/DSCN MKR DOCD: CPT | Performed by: OBSTETRICS & GYNECOLOGY

## 2024-08-06 PROCEDURE — 1036F TOBACCO NON-USER: CPT | Performed by: OBSTETRICS & GYNECOLOGY

## 2024-08-06 PROCEDURE — G8417 CALC BMI ABV UP PARAM F/U: HCPCS | Performed by: OBSTETRICS & GYNECOLOGY

## 2024-08-06 PROCEDURE — 3017F COLORECTAL CA SCREEN DOC REV: CPT | Performed by: OBSTETRICS & GYNECOLOGY

## 2024-08-06 PROCEDURE — G8399 PT W/DXA RESULTS DOCUMENT: HCPCS | Performed by: OBSTETRICS & GYNECOLOGY

## 2024-08-06 PROCEDURE — G8427 DOCREV CUR MEDS BY ELIG CLIN: HCPCS | Performed by: OBSTETRICS & GYNECOLOGY

## 2024-08-06 PROCEDURE — 3074F SYST BP LT 130 MM HG: CPT | Performed by: OBSTETRICS & GYNECOLOGY

## 2024-08-06 NOTE — PROGRESS NOTES
HPI  Paula Zhang is a 69 y.o. female seen for burning at the introitus where the punch biopsy was done.  Pathology was benign.  She was putting a lot of steroid cream on this area and now is worried the biopsy site is infected because when she urinates she gets burning where the biopsy was taken from.      Past Medical History, Past Surgical History, Family history, Social History, and Medications were all reviewed with the patient today and updated as necessary.     Current Outpatient Medications   Medication Sig    clobetasol (TEMOVATE) 0.05 % cream Apply topically 3 times daily.    estradiol (ESTRACE VAGINAL) 0.1 MG/GM vaginal cream 1 gram intravaginally twice a week    temazepam (RESTORIL) 15 MG capsule Take 2 capsules by mouth nightly as needed for Sleep for up to 180 days. 2 poqhs Max Daily Amount: 30 mg    losartan (COZAAR) 100 MG tablet Take 1 tablet by mouth daily TAKE 1 TABLET BY MOUTH EVERY DAY    rosuvastatin (CRESTOR) 10 MG tablet Take 1 tablet by mouth at bedtime    medroxyPROGESTERone (PROVERA) 2.5 MG tablet Take 1 tablet by mouth every 30 days    hydrocortisone 2.5 % cream Apply topically 2 times daily.     No current facility-administered medications for this visit.     No Known Allergies  Past Medical History:   Diagnosis Date    Anxiety state, unspecified     Carpal tunnel syndrome     Essential hypertension, benign     Insomnia, unspecified     Pain in joint, multiple sites     Plantar fascial fibromatosis     Postmenopausal atrophic vaginitis     Unspecified hypothyroidism     Urinary leakage      Past Surgical History:   Procedure Laterality Date    CARPAL TUNNEL RELEASE Bilateral 8/2010    Carpal tunnel surgery.     CATARACT REMOVAL  8/06    Cataract surgery     VULVAR/PERINEAL BIOPSY      7/29/24     Family History   Problem Relation Age of Onset    Breast Cancer Mother 47    Hypertension Mother     Coronary Art Dis Father         onset 50's, lived to be 91 with dementia    Arrhythmia

## 2024-08-22 NOTE — PROGRESS NOTES
HPI  Paula Zhang is a 69 y.o. female seen for 1 month follow up fissure at the introitus, vulvar irritation and biopsy site.  She still has some sensitivity at the introitus.  She has stopped the clobetasol.  She is using the Estrace cream.      Past Medical History, Past Surgical History, Family history, Social History, and Medications were all reviewed with the patient today and updated as necessary.     Current Outpatient Medications   Medication Sig    clobetasol (TEMOVATE) 0.05 % cream Apply topically 3 times daily.    estradiol (ESTRACE VAGINAL) 0.1 MG/GM vaginal cream 1 gram intravaginally twice a week    temazepam (RESTORIL) 15 MG capsule Take 2 capsules by mouth nightly as needed for Sleep for up to 180 days. 2 poqhs Max Daily Amount: 30 mg    losartan (COZAAR) 100 MG tablet Take 1 tablet by mouth daily TAKE 1 TABLET BY MOUTH EVERY DAY    rosuvastatin (CRESTOR) 10 MG tablet Take 1 tablet by mouth at bedtime    medroxyPROGESTERone (PROVERA) 2.5 MG tablet Take 1 tablet by mouth every 30 days (Patient not taking: Reported on 8/26/2024)    hydrocortisone 2.5 % cream Apply topically 2 times daily.     No current facility-administered medications for this visit.     No Known Allergies  Past Medical History:   Diagnosis Date    Anxiety state, unspecified     Carpal tunnel syndrome     Essential hypertension, benign     Insomnia, unspecified     Pain in joint, multiple sites     Plantar fascial fibromatosis     Postmenopausal atrophic vaginitis     Unspecified hypothyroidism     Urinary leakage      Past Surgical History:   Procedure Laterality Date    CARPAL TUNNEL RELEASE Bilateral 8/2010    Carpal tunnel surgery.     CATARACT REMOVAL  8/06    Cataract surgery     VULVAR/PERINEAL BIOPSY      7/29/24     Family History   Problem Relation Age of Onset    Breast Cancer Mother 47    Hypertension Mother     Coronary Art Dis Father         onset 50's, lived to be 91 with dementia    Arrhythmia Father     Dementia  Father     Pacemaker Father     Dementia Paternal Grandfather       Social History     Tobacco Use    Smoking status: Never    Smokeless tobacco: Never   Substance Use Topics    Alcohol use: Yes     Alcohol/week: 1.0 standard drink of alcohol       Social History     Substance and Sexual Activity   Sexual Activity Yes    Partners: Male    Birth control/protection: None     OB History    Para Term  AB Living   0 0 0 0 0 0   SAB IAB Ectopic Molar Multiple Live Births   0 0 0 0 0 0       Health Maintenance  Mammogram:   Colonoscopy:   Bone Density:    ROS:  Review of Systems  General: Not Present- Fatigue, Insomnia, Hot flashes/Night sweats, Weight gain, Brain fog  Breast: Not Present- Breast Mass, Breast Pain, Breast Swelling, Nipple Discharge, Nipple Pain, Recent Breast Size Changes and Skin Changes.  Gastrointestinal: Not Present- Abdominal Pain,  Bloating, Constipation, Diarrhea, Nausea, Rectal bleeding  Female Genitourinary: Not Present- Dysmenorrhea, Dyspareunia, Decreased libido, Excessive Menstrual Bleeding, Menstrual Irregularities, Pelvic Pain, Urinary Complaints, Vaginal Discharge, Vaginal itching/burning, Vaginal odor, Vaginal dryness  Psychiatric: Not Present- Anxiety, Depression, Mood changes and Panic Attacks.    PHYSICAL EXAM:    Ht 1.575 m (5' 2\")   Wt 80.7 kg (178 lb)   BMI 32.56 kg/m²         General   Mental Status - Well groomed; well nourished.  Oriented x 3.  Appropriate mood and affect      Female Genitourinary     External Genitalia  The fissure area and biopsy site are well healed.    Vulva: Normal.   Perineum: Normal.   Bartholin's Gland:  Normal.   Clitoris :  Normal.   Introitus:  Redness on both sides of labia near introitus  Urethra:  Normal.         Medical problems and test results were reviewed with the patient today.     ASSESSMENT and PLAN    1. Fissure in skin       Advised that everything is healed and I feel like she can resume normal activities.  Continue to use

## 2024-08-26 ENCOUNTER — OFFICE VISIT (OUTPATIENT)
Dept: OBGYN CLINIC | Age: 69
End: 2024-08-26
Payer: MEDICARE

## 2024-08-26 VITALS — WEIGHT: 178 LBS | HEIGHT: 62 IN | BODY MASS INDEX: 32.76 KG/M2

## 2024-08-26 DIAGNOSIS — R23.4 FISSURE IN SKIN: Primary | ICD-10-CM

## 2024-08-26 PROCEDURE — 3017F COLORECTAL CA SCREEN DOC REV: CPT | Performed by: OBSTETRICS & GYNECOLOGY

## 2024-08-26 PROCEDURE — 1036F TOBACCO NON-USER: CPT | Performed by: OBSTETRICS & GYNECOLOGY

## 2024-08-26 PROCEDURE — 1090F PRES/ABSN URINE INCON ASSESS: CPT | Performed by: OBSTETRICS & GYNECOLOGY

## 2024-08-26 PROCEDURE — G8417 CALC BMI ABV UP PARAM F/U: HCPCS | Performed by: OBSTETRICS & GYNECOLOGY

## 2024-08-26 PROCEDURE — G8427 DOCREV CUR MEDS BY ELIG CLIN: HCPCS | Performed by: OBSTETRICS & GYNECOLOGY

## 2024-08-26 PROCEDURE — 99214 OFFICE O/P EST MOD 30 MIN: CPT | Performed by: OBSTETRICS & GYNECOLOGY

## 2024-08-26 PROCEDURE — 1123F ACP DISCUSS/DSCN MKR DOCD: CPT | Performed by: OBSTETRICS & GYNECOLOGY

## 2024-08-26 PROCEDURE — G8399 PT W/DXA RESULTS DOCUMENT: HCPCS | Performed by: OBSTETRICS & GYNECOLOGY

## 2024-08-30 ENCOUNTER — OFFICE VISIT (OUTPATIENT)
Dept: FAMILY MEDICINE CLINIC | Facility: CLINIC | Age: 69
End: 2024-08-30

## 2024-08-30 VITALS
RESPIRATION RATE: 18 BRPM | OXYGEN SATURATION: 95 % | BODY MASS INDEX: 32.39 KG/M2 | HEIGHT: 62 IN | DIASTOLIC BLOOD PRESSURE: 78 MMHG | HEART RATE: 73 BPM | TEMPERATURE: 98 F | WEIGHT: 176 LBS | SYSTOLIC BLOOD PRESSURE: 130 MMHG

## 2024-08-30 DIAGNOSIS — I10 ESSENTIAL HYPERTENSION, BENIGN: Primary | ICD-10-CM

## 2024-08-30 DIAGNOSIS — E78.00 PURE HYPERCHOLESTEROLEMIA: ICD-10-CM

## 2024-08-30 DIAGNOSIS — F51.01 PRIMARY INSOMNIA: ICD-10-CM

## 2024-08-30 DIAGNOSIS — F13.99 SEDATIVE, HYPNOTIC OR ANXIOLYTIC USE, UNSPECIFIED WITH UNSPECIFIED SEDATIVE, HYPNOTIC OR ANXIOLYTIC-INDUCED DISORDER (HCC): ICD-10-CM

## 2024-08-30 DIAGNOSIS — E66.09 CLASS 1 OBESITY DUE TO EXCESS CALORIES WITH SERIOUS COMORBIDITY AND BODY MASS INDEX (BMI) OF 32.0 TO 32.9 IN ADULT: ICD-10-CM

## 2024-08-30 LAB
BASOPHILS # BLD: 0.1 K/UL (ref 0–0.2)
BASOPHILS NFR BLD: 1 % (ref 0–2)
DIFFERENTIAL METHOD BLD: NORMAL
EOSINOPHIL # BLD: 0.2 K/UL (ref 0–0.8)
EOSINOPHIL NFR BLD: 2 % (ref 0.5–7.8)
ERYTHROCYTE [DISTWIDTH] IN BLOOD BY AUTOMATED COUNT: 11.9 % (ref 11.9–14.6)
HCT VFR BLD AUTO: 44.1 % (ref 35.8–46.3)
HGB BLD-MCNC: 14.9 G/DL (ref 11.7–15.4)
IMM GRANULOCYTES # BLD AUTO: 0 K/UL (ref 0–0.5)
IMM GRANULOCYTES NFR BLD AUTO: 0 % (ref 0–5)
LYMPHOCYTES # BLD: 2.3 K/UL (ref 0.5–4.6)
LYMPHOCYTES NFR BLD: 29 % (ref 13–44)
MCH RBC QN AUTO: 32.5 PG (ref 26.1–32.9)
MCHC RBC AUTO-ENTMCNC: 33.8 G/DL (ref 31.4–35)
MCV RBC AUTO: 96.1 FL (ref 82–102)
MONOCYTES # BLD: 0.5 K/UL (ref 0.1–1.3)
MONOCYTES NFR BLD: 7 % (ref 4–12)
NEUTS SEG # BLD: 4.7 K/UL (ref 1.7–8.2)
NEUTS SEG NFR BLD: 61 % (ref 43–78)
NRBC # BLD: 0 K/UL (ref 0–0.2)
PLATELET # BLD AUTO: 265 K/UL (ref 150–450)
PMV BLD AUTO: 9.9 FL (ref 9.4–12.3)
RBC # BLD AUTO: 4.59 M/UL (ref 4.05–5.2)
WBC # BLD AUTO: 7.7 K/UL (ref 4.3–11.1)

## 2024-08-30 ASSESSMENT — PATIENT HEALTH QUESTIONNAIRE - PHQ9
2. FEELING DOWN, DEPRESSED OR HOPELESS: NOT AT ALL
1. LITTLE INTEREST OR PLEASURE IN DOING THINGS: NOT AT ALL
SUM OF ALL RESPONSES TO PHQ QUESTIONS 1-9: 0
SUM OF ALL RESPONSES TO PHQ QUESTIONS 1-9: 0
SUM OF ALL RESPONSES TO PHQ9 QUESTIONS 1 & 2: 0
SUM OF ALL RESPONSES TO PHQ QUESTIONS 1-9: 0
SUM OF ALL RESPONSES TO PHQ QUESTIONS 1-9: 0

## 2024-08-30 ASSESSMENT — ENCOUNTER SYMPTOMS
VOMITING: 0
COUGH: 0
DIARRHEA: 0
SHORTNESS OF BREATH: 0
NAUSEA: 0

## 2024-08-30 NOTE — PROGRESS NOTES
Paula Zhang (:  1955) is a 69 y.o. female,Established patient, here for evaluation of the following chief complaint(s):  Follow-up (Chronic care follow up. Fasting. ), Hypertension (Well-controlled/stable-check labs), and Cholesterol Problem (Repeat labs)         Assessment & Plan  Essential hypertension, benign   Well-controlled, continue current medications  Stable, check labs  Orders:  •  CBC with Auto Differential; Future  •  Comprehensive Metabolic Panel; Future  •  Comprehensive Metabolic Panel  •  CBC with Auto Differential    Pure hypercholesterolemia     Stable repeat labs  Orders:  •  Comprehensive Metabolic Panel; Future  •  Lipid Panel; Future  •  Lipid Panel  •  Comprehensive Metabolic Panel    Primary insomnia   Well-controlled, continue current medications  Stable on med,no daytime sedation       Sedative, hypnotic or anxiolytic use, unspecified with unspecified sedative, hypnotic or anxiolytic-induced disorder (HCC)   Well-controlled, continue current medications  Stable, no sedation       Class 1 obesity due to excess calories with serious comorbidity and body mass index (BMI) of 32.0 to 32.9 in adult   Uncontrolled, lifestyle modifications recommended  BMI handout         Return in about 3 months (around 2024) for fasting chronic care.       Subjective   Hypertension  This is a chronic problem. The current episode started more than 1 year ago. The problem is unchanged. The problem is controlled. Pertinent negatives include no chest pain or shortness of breath.   Hyperlipidemia  This is a chronic problem. The current episode started more than 1 year ago. The problem is controlled. Recent lipid tests were reviewed and are variable. Pertinent negatives include no chest pain or shortness of breath.   Other  This is a chronic problem. The current episode started more than 1 year ago. The problem occurs constantly. The problem has been unchanged. Pertinent negatives include no chest  pain, chills, coughing, fatigue, nausea or vomiting.     Review of Systems   Constitutional:  Negative for chills and fatigue.   Respiratory:  Negative for cough and shortness of breath.    Cardiovascular:  Negative for chest pain and leg swelling.   Gastrointestinal:  Negative for diarrhea, nausea and vomiting.        Objective   Physical Exam  Vitals and nursing note reviewed.   Constitutional:       General: She is not in acute distress.     Appearance: Normal appearance. She is obese.   HENT:      Head: Normocephalic and atraumatic.      Nose: Nose normal.      Mouth/Throat:      Pharynx: Oropharynx is clear.   Eyes:      Extraocular Movements: Extraocular movements intact.      Conjunctiva/sclera: Conjunctivae normal.   Cardiovascular:      Rate and Rhythm: Normal rate and regular rhythm.      Pulses: Normal pulses.      Heart sounds: Normal heart sounds.   Pulmonary:      Effort: Pulmonary effort is normal.      Breath sounds: Normal breath sounds.   Musculoskeletal:         General: No swelling or tenderness. Normal range of motion.      Cervical back: Normal range of motion and neck supple.   Skin:     General: Skin is warm and dry.   Neurological:      General: No focal deficit present.      Mental Status: She is alert. Mental status is at baseline.   Psychiatric:         Mood and Affect: Mood normal.         Behavior: Behavior normal.              An electronic signature was used to authenticate this note.    --Edgard Browne Jr, MD

## 2024-08-30 NOTE — ASSESSMENT & PLAN NOTE
Well-controlled, continue current medications  Stable repeat labs  Orders:    Comprehensive Metabolic Panel; Future    Lipid Panel; Future    Lipid Panel    Comprehensive Metabolic Panel

## 2024-08-30 NOTE — ASSESSMENT & PLAN NOTE
Well-controlled, continue current medications  Stable, check labs  Orders:    CBC with Auto Differential; Future    Comprehensive Metabolic Panel; Future    Comprehensive Metabolic Panel    CBC with Auto Differential

## 2024-08-31 LAB
ALBUMIN SERPL-MCNC: 4.4 G/DL (ref 3.2–4.6)
ALBUMIN/GLOB SERPL: 1.5 (ref 1–1.9)
ALP SERPL-CCNC: 61 U/L (ref 35–104)
ALT SERPL-CCNC: 26 U/L (ref 12–65)
ANION GAP SERPL CALC-SCNC: 12 MMOL/L (ref 9–18)
AST SERPL-CCNC: 25 U/L (ref 15–37)
BILIRUB SERPL-MCNC: 0.5 MG/DL (ref 0–1.2)
BUN SERPL-MCNC: 16 MG/DL (ref 8–23)
CALCIUM SERPL-MCNC: 9.7 MG/DL (ref 8.8–10.2)
CHLORIDE SERPL-SCNC: 106 MMOL/L (ref 98–107)
CHOLEST SERPL-MCNC: 159 MG/DL (ref 0–200)
CO2 SERPL-SCNC: 24 MMOL/L (ref 20–28)
CREAT SERPL-MCNC: 0.94 MG/DL (ref 0.6–1.1)
GLOBULIN SER CALC-MCNC: 3 G/DL (ref 2.3–3.5)
GLUCOSE SERPL-MCNC: 104 MG/DL (ref 70–99)
HDLC SERPL-MCNC: 52 MG/DL (ref 40–60)
HDLC SERPL: 3.1 (ref 0–5)
LDLC SERPL CALC-MCNC: 88 MG/DL (ref 0–100)
POTASSIUM SERPL-SCNC: 4.2 MMOL/L (ref 3.5–5.1)
PROT SERPL-MCNC: 7.4 G/DL (ref 6.3–8.2)
SODIUM SERPL-SCNC: 142 MMOL/L (ref 136–145)
TRIGL SERPL-MCNC: 93 MG/DL (ref 0–150)
VLDLC SERPL CALC-MCNC: 19 MG/DL (ref 6–23)

## 2024-10-16 ENCOUNTER — TRANSCRIBE ORDERS (OUTPATIENT)
Dept: SCHEDULING | Age: 69
End: 2024-10-16

## 2024-10-16 DIAGNOSIS — Z12.31 VISIT FOR SCREENING MAMMOGRAM: Primary | ICD-10-CM

## 2024-11-25 ENCOUNTER — HOSPITAL ENCOUNTER (OUTPATIENT)
Dept: MAMMOGRAPHY | Age: 69
Discharge: HOME | End: 2024-11-28
Attending: FAMILY MEDICINE
Payer: MEDICARE

## 2024-11-25 VITALS — BODY MASS INDEX: 31.01 KG/M2 | WEIGHT: 175 LBS | HEIGHT: 63 IN

## 2024-11-25 DIAGNOSIS — Z12.31 VISIT FOR SCREENING MAMMOGRAM: ICD-10-CM

## 2024-11-25 PROCEDURE — 77067 SCR MAMMO BI INCL CAD: CPT

## 2024-11-26 ENCOUNTER — OFFICE VISIT (OUTPATIENT)
Dept: FAMILY MEDICINE CLINIC | Facility: CLINIC | Age: 69
End: 2024-11-26

## 2024-11-26 VITALS
WEIGHT: 180 LBS | DIASTOLIC BLOOD PRESSURE: 80 MMHG | TEMPERATURE: 97.6 F | OXYGEN SATURATION: 96 % | BODY MASS INDEX: 33.13 KG/M2 | SYSTOLIC BLOOD PRESSURE: 128 MMHG | HEART RATE: 78 BPM | RESPIRATION RATE: 18 BRPM | HEIGHT: 62 IN

## 2024-11-26 DIAGNOSIS — Z12.11 COLON CANCER SCREENING: ICD-10-CM

## 2024-11-26 DIAGNOSIS — I10 ESSENTIAL HYPERTENSION, BENIGN: ICD-10-CM

## 2024-11-26 DIAGNOSIS — E78.00 PURE HYPERCHOLESTEROLEMIA: ICD-10-CM

## 2024-11-26 DIAGNOSIS — I70.0 ATHEROSCLEROSIS OF AORTA (HCC): ICD-10-CM

## 2024-11-26 DIAGNOSIS — F13.99 SEDATIVE, HYPNOTIC OR ANXIOLYTIC USE, UNSPECIFIED WITH UNSPECIFIED SEDATIVE, HYPNOTIC OR ANXIOLYTIC-INDUCED DISORDER (HCC): ICD-10-CM

## 2024-11-26 DIAGNOSIS — F51.01 PRIMARY INSOMNIA: ICD-10-CM

## 2024-11-26 DIAGNOSIS — L30.9 ECZEMA, UNSPECIFIED TYPE: ICD-10-CM

## 2024-11-26 DIAGNOSIS — N95.2 VAGINAL ATROPHY: ICD-10-CM

## 2024-11-26 DIAGNOSIS — Z00.00 MEDICARE ANNUAL WELLNESS VISIT, SUBSEQUENT: Primary | ICD-10-CM

## 2024-11-26 LAB
ALBUMIN SERPL-MCNC: 4.1 G/DL (ref 3.2–4.6)
ALBUMIN/GLOB SERPL: 1.3 (ref 1–1.9)
ALP SERPL-CCNC: 58 U/L (ref 35–104)
ALT SERPL-CCNC: 22 U/L (ref 8–45)
ANION GAP SERPL CALC-SCNC: 11 MMOL/L (ref 7–16)
AST SERPL-CCNC: 24 U/L (ref 15–37)
BASOPHILS # BLD: 0.1 K/UL (ref 0–0.2)
BASOPHILS NFR BLD: 1 % (ref 0–2)
BILIRUB SERPL-MCNC: 0.5 MG/DL (ref 0–1.2)
BUN SERPL-MCNC: 12 MG/DL (ref 8–23)
CALCIUM SERPL-MCNC: 9.6 MG/DL (ref 8.8–10.2)
CHLORIDE SERPL-SCNC: 105 MMOL/L (ref 98–107)
CHOLEST SERPL-MCNC: 152 MG/DL (ref 0–200)
CO2 SERPL-SCNC: 26 MMOL/L (ref 20–29)
CREAT SERPL-MCNC: 0.97 MG/DL (ref 0.6–1.1)
DIFFERENTIAL METHOD BLD: NORMAL
EOSINOPHIL # BLD: 0.2 K/UL (ref 0–0.8)
EOSINOPHIL NFR BLD: 2 % (ref 0.5–7.8)
ERYTHROCYTE [DISTWIDTH] IN BLOOD BY AUTOMATED COUNT: 11.9 % (ref 11.9–14.6)
GLOBULIN SER CALC-MCNC: 3.1 G/DL (ref 2.3–3.5)
GLUCOSE SERPL-MCNC: 108 MG/DL (ref 70–99)
HCT VFR BLD AUTO: 42.1 % (ref 35.8–46.3)
HDLC SERPL-MCNC: 54 MG/DL (ref 40–60)
HDLC SERPL: 2.8 (ref 0–5)
HGB BLD-MCNC: 14.4 G/DL (ref 11.7–15.4)
IMM GRANULOCYTES # BLD AUTO: 0 K/UL (ref 0–0.5)
IMM GRANULOCYTES NFR BLD AUTO: 0 % (ref 0–5)
LDLC SERPL CALC-MCNC: 78 MG/DL (ref 0–100)
LYMPHOCYTES # BLD: 2.7 K/UL (ref 0.5–4.6)
LYMPHOCYTES NFR BLD: 33 % (ref 13–44)
MCH RBC QN AUTO: 32.5 PG (ref 26.1–32.9)
MCHC RBC AUTO-ENTMCNC: 34.2 G/DL (ref 31.4–35)
MCV RBC AUTO: 95 FL (ref 82–102)
MONOCYTES # BLD: 0.7 K/UL (ref 0.1–1.3)
MONOCYTES NFR BLD: 8 % (ref 4–12)
NEUTS SEG # BLD: 4.5 K/UL (ref 1.7–8.2)
NEUTS SEG NFR BLD: 56 % (ref 43–78)
NRBC # BLD: 0 K/UL (ref 0–0.2)
PLATELET # BLD AUTO: 264 K/UL (ref 150–450)
PMV BLD AUTO: 10 FL (ref 9.4–12.3)
POTASSIUM SERPL-SCNC: 4.4 MMOL/L (ref 3.5–5.1)
PROT SERPL-MCNC: 7.2 G/DL (ref 6.3–8.2)
RBC # BLD AUTO: 4.43 M/UL (ref 4.05–5.2)
SODIUM SERPL-SCNC: 141 MMOL/L (ref 136–145)
TRIGL SERPL-MCNC: 101 MG/DL (ref 0–150)
VLDLC SERPL CALC-MCNC: 20 MG/DL (ref 6–23)
WBC # BLD AUTO: 8.1 K/UL (ref 4.3–11.1)

## 2024-11-26 RX ORDER — ROSUVASTATIN CALCIUM 10 MG/1
10 TABLET, COATED ORAL NIGHTLY
Qty: 90 TABLET | Refills: 3 | Status: SHIPPED | OUTPATIENT
Start: 2024-11-26

## 2024-11-26 RX ORDER — CONJUGATED ESTROGENS 0.62 MG/G
1.5 CREAM VAGINAL
Qty: 85 G | Refills: 3 | Status: SHIPPED | OUTPATIENT
Start: 2024-11-27

## 2024-11-26 RX ORDER — TEMAZEPAM 15 MG/1
30 CAPSULE ORAL NIGHTLY PRN
Qty: 180 CAPSULE | Refills: 1 | Status: SHIPPED | OUTPATIENT
Start: 2024-11-26 | End: 2025-05-25

## 2024-11-26 RX ORDER — ESTRADIOL 0.1 MG/G
CREAM VAGINAL
Qty: 42.5 G | Refills: 5 | Status: SHIPPED | OUTPATIENT
Start: 2024-11-26

## 2024-11-26 RX ORDER — LOSARTAN POTASSIUM 100 MG/1
100 TABLET ORAL DAILY
Qty: 90 TABLET | Refills: 3 | Status: SHIPPED | OUTPATIENT
Start: 2024-11-26

## 2024-11-26 ASSESSMENT — PATIENT HEALTH QUESTIONNAIRE - PHQ9
1. LITTLE INTEREST OR PLEASURE IN DOING THINGS: NOT AT ALL
SUM OF ALL RESPONSES TO PHQ QUESTIONS 1-9: 0
SUM OF ALL RESPONSES TO PHQ QUESTIONS 1-9: 0
SUM OF ALL RESPONSES TO PHQ9 QUESTIONS 1 & 2: 0
SUM OF ALL RESPONSES TO PHQ QUESTIONS 1-9: 0
2. FEELING DOWN, DEPRESSED OR HOPELESS: NOT AT ALL
SUM OF ALL RESPONSES TO PHQ QUESTIONS 1-9: 0

## 2024-11-26 ASSESSMENT — LIFESTYLE VARIABLES
HOW MANY STANDARD DRINKS CONTAINING ALCOHOL DO YOU HAVE ON A TYPICAL DAY: 1 OR 2
HOW OFTEN DO YOU HAVE A DRINK CONTAINING ALCOHOL: 2-3 TIMES A WEEK

## 2024-11-26 ASSESSMENT — ENCOUNTER SYMPTOMS
SHORTNESS OF BREATH: 0
DIARRHEA: 0
VOMITING: 0
COUGH: 0
NAUSEA: 0

## 2024-11-26 NOTE — ASSESSMENT & PLAN NOTE
Chronic, at goal (stable), continue current treatment plan    Orders:    temazepam (RESTORIL) 15 MG capsule; Take 2 capsules by mouth nightly as needed for Sleep for up to 180 days. 2 poqhs Max Daily Amount: 30 mg

## 2024-11-26 NOTE — ASSESSMENT & PLAN NOTE
Chronic, at goal (stable), continue current treatment plan  Rfill statin  Orders:    rosuvastatin (CRESTOR) 10 MG tablet; Take 1 tablet by mouth at bedtime

## 2024-11-26 NOTE — PATIENT INSTRUCTIONS
Learning About Being Active as an Older Adult  Why is being active important as you get older?     Being active is one of the best things you can do for your health. And it's never too late to start. Being active--or getting active, if you aren't already--has definite benefits. It can:  Give you more energy,  Keep your mind sharp.  Improve balance to reduce your risk of falls.  Help you manage chronic illness with fewer medicines.  No matter how old you are, how fit you are, or what health problems you have, there is a form of activity that will work for you. And the more physical activity you can do, the better your overall health will be.  What kinds of activity can help you stay healthy?  Being more active will make your daily activities easier. Physical activity includes planned exercise and things you do in daily life. There are four types of activity:  Aerobic.  Doing aerobic activity makes your heart and lungs strong.  Includes walking, dancing, and gardening.  Aim for at least 2½ hours spread throughout the week.  It improves your energy and can help you sleep better.  Muscle-strengthening.  This type of activity can help maintain muscle and strengthen bones.  Includes climbing stairs, using resistance bands, and lifting or carrying heavy loads.  Aim for at least twice a week.  It can help protect the knees and other joints.  Stretching.  Stretching gives you better range of motion in joints and muscles.  Includes upper arm stretches, calf stretches, and gentle yoga.  Aim for at least twice a week, preferably after your muscles are warmed up from other activities.  It can help you function better in daily life.  Balancing.  This helps you stay coordinated and have good posture.  Includes heel-to-toe walking, lance chi, and certain types of yoga.  Aim for at least 3 days a week.  It can reduce your risk of falling.  Even if you have a hard time meeting the recommendations, it's better to be more active

## 2024-11-26 NOTE — PROGRESS NOTES
Medicare Annual Wellness Visit    Paula Zhang is here for Follow-up (Chronic care follow up. Fasting. ), Hypertension, Cholesterol Problem, and Medicare AWV    Assessment & Plan   Medicare annual wellness visit, subsequent    Depression, Fall, Cognitive, Alcohol screening negative,shots all UTD    Recommendations for Preventive Services Due: see orders and patient instructions/AVS.  Recommended screening schedule for the next 5-10 years is provided to the patient in written form: see Patient Instructions/AVS.     No follow-ups on file.     Subjective       Patient's complete Health Risk Assessment and screening values have been reviewed and are found in Flowsheets. The following problems were reviewed today and where indicated follow up appointments were made and/or referrals ordered.    Positive Risk Factor Screenings with Interventions:              Inactivity:  On average, how many days per week do you engage in moderate to strenuous exercise (like a brisk walk)?: 0 days (!) Abnormal  On average, how many minutes do you engage in exercise at this level?: 0 min  Interventions:  See AVS for additional education material     Abnormal BMI (obese):  Body mass index is 32.92 kg/m². (!) Abnormal  Interventions:  See AVS for additional education material           Safety:  Do you have non-slip mats or non-slip surfaces or shower bars or grab bars in your shower or bathtub?: (!) No  Interventions:  See AVS for additional education material                   Objective   Vitals:    11/26/24 0937   BP: 128/80   Pulse: 78   Resp: 18   Temp: 97.6 °F (36.4 °C)   SpO2: 96%   Weight: 81.6 kg (180 lb)   Height: 1.575 m (5' 2\")      Body mass index is 32.92 kg/m².                  No Known Allergies  Prior to Visit Medications    Medication Sig Taking? Authorizing Provider   temazepam (RESTORIL) 15 MG capsule Take 2 capsules by mouth nightly as needed for Sleep for up to 180 days. 2 poqhs Max Daily Amount: 30 mg Yes Hollie 
status is at baseline.   Psychiatric:         Mood and Affect: Mood normal.         Behavior: Behavior normal.              An electronic signature was used to authenticate this note.    --Edgard Browne Jr, MD

## 2024-11-26 NOTE — ASSESSMENT & PLAN NOTE
Chronic, at goal (stable), continue current treatment plan  Refill med an check labs  Orders:    CBC with Auto Differential; Future    Comprehensive Metabolic Panel; Future    losartan (COZAAR) 100 MG tablet; Take 1 tablet by mouth daily TAKE 1 TABLET BY MOUTH EVERY DAY    Comprehensive Metabolic Panel    CBC with Auto Differential

## 2024-11-26 NOTE — ASSESSMENT & PLAN NOTE
Chronic, at goal (stable), continue current treatment plan  Refill med, no daytime sedation  Orders:    temazepam (RESTORIL) 15 MG capsule; Take 2 capsules by mouth nightly as needed for Sleep for up to 180 days. 2 poqhs Max Daily Amount: 30 mg

## 2024-11-26 NOTE — ASSESSMENT & PLAN NOTE
Chronic, at goal (stable), continue current treatment plan  Check labs  Orders:    Comprehensive Metabolic Panel; Future    Lipid Panel; Future    rosuvastatin (CRESTOR) 10 MG tablet; Take 1 tablet by mouth at bedtime    Lipid Panel    Comprehensive Metabolic Panel

## 2025-01-31 ENCOUNTER — OFFICE VISIT (OUTPATIENT)
Dept: OBGYN CLINIC | Age: 70
End: 2025-01-31
Payer: MEDICARE

## 2025-01-31 VITALS
HEIGHT: 62 IN | WEIGHT: 174 LBS | DIASTOLIC BLOOD PRESSURE: 70 MMHG | SYSTOLIC BLOOD PRESSURE: 132 MMHG | BODY MASS INDEX: 32.02 KG/M2

## 2025-01-31 DIAGNOSIS — N95.2 ATROPHIC VULVOVAGINITIS: Primary | ICD-10-CM

## 2025-01-31 DIAGNOSIS — N39.46 MIXED INCONTINENCE URGE AND STRESS: ICD-10-CM

## 2025-01-31 DIAGNOSIS — R23.4 FISSURE IN SKIN: ICD-10-CM

## 2025-01-31 PROCEDURE — 0509F URINE INCON PLAN DOCD: CPT | Performed by: OBSTETRICS & GYNECOLOGY

## 2025-01-31 PROCEDURE — 3017F COLORECTAL CA SCREEN DOC REV: CPT | Performed by: OBSTETRICS & GYNECOLOGY

## 2025-01-31 PROCEDURE — 1160F RVW MEDS BY RX/DR IN RCRD: CPT | Performed by: OBSTETRICS & GYNECOLOGY

## 2025-01-31 PROCEDURE — 1036F TOBACCO NON-USER: CPT | Performed by: OBSTETRICS & GYNECOLOGY

## 2025-01-31 PROCEDURE — 1123F ACP DISCUSS/DSCN MKR DOCD: CPT | Performed by: OBSTETRICS & GYNECOLOGY

## 2025-01-31 PROCEDURE — 3075F SYST BP GE 130 - 139MM HG: CPT | Performed by: OBSTETRICS & GYNECOLOGY

## 2025-01-31 PROCEDURE — 99459 PELVIC EXAMINATION: CPT | Performed by: OBSTETRICS & GYNECOLOGY

## 2025-01-31 PROCEDURE — G8399 PT W/DXA RESULTS DOCUMENT: HCPCS | Performed by: OBSTETRICS & GYNECOLOGY

## 2025-01-31 PROCEDURE — G8417 CALC BMI ABV UP PARAM F/U: HCPCS | Performed by: OBSTETRICS & GYNECOLOGY

## 2025-01-31 PROCEDURE — 99214 OFFICE O/P EST MOD 30 MIN: CPT | Performed by: OBSTETRICS & GYNECOLOGY

## 2025-01-31 PROCEDURE — 3078F DIAST BP <80 MM HG: CPT | Performed by: OBSTETRICS & GYNECOLOGY

## 2025-01-31 PROCEDURE — 1090F PRES/ABSN URINE INCON ASSESS: CPT | Performed by: OBSTETRICS & GYNECOLOGY

## 2025-01-31 PROCEDURE — G8427 DOCREV CUR MEDS BY ELIG CLIN: HCPCS | Performed by: OBSTETRICS & GYNECOLOGY

## 2025-01-31 PROCEDURE — 1159F MED LIST DOCD IN RCRD: CPT | Performed by: OBSTETRICS & GYNECOLOGY

## 2025-01-31 RX ORDER — CLOBETASOL PROPIONATE 0.5 MG/G
CREAM TOPICAL
Qty: 30 G | Refills: 3 | Status: SHIPPED | OUTPATIENT
Start: 2025-01-31

## 2025-01-31 NOTE — PROGRESS NOTES
Patient here for concern of vaginal atrophy.   Patient has been on premarin for approx 10-15 years for this issue and it stopped working approx 1-2 years ago. Patient states that the main issue is external vaginal dryness, she states she is fine in the internal vaginal area.   Patient did try switching to estradiol but did not tolerate.     Patient also has the concern of urinary leakage for approx 5 years ago, has worsened in the last year. She did discuss this with other provider Dr. Ace last year but did not follow up regarding pelvic floor therapy.     LAST PAP:  4/23/2024, neg., HPV neg.     LAST MAMMO:  11/25/2024     LMP:  No LMP recorded. Patient is postmenopausal.    BIRTH CONTROL:  post menopausal status    TOBACCO USE:  No    FAMILY HISTORY OF:   Breast Cancer:  Yes-mother dx at age 47    Ovarian Cancer:  No   Uterine Cancer:  No   Colon Cancer:  No    Vitals:    01/31/25 0845   BP: 132/70   Site: Left Upper Arm   Position: Sitting   Weight: 78.9 kg (174 lb)   Height: 1.575 m (5' 2\")        RANDY BETTS RN  01/31/25  9:02 AM

## 2025-01-31 NOTE — ASSESSMENT & PLAN NOTE
All previous notes, labs and/or imaging performed by prev Gyn, PCP were reviewed and confirmed with pt today.  I interpreted these results and D/W pt my opinion and recommendations accordingly.   D/W meds and usage (2x week for E2 and steroid)  Path benign - hydration also indicated

## 2025-01-31 NOTE — PATIENT INSTRUCTIONS
You should continue to use your estrogen cream twice a week and use the steroid cream twice a week on alternate days.  Try to get some very hydrating lotion and put on outside area frequently  Please do the Kegel exercises as we discussed. Make sure you try to go to the bathroom every 2-3 hours throughout the day, don't have anything to drink after dinner and go to the bathroom right before bed to help retrain your bladder to work better.   Please come back for a recheck in 3-4 months  Thanks for coming to see us today and letting us take care of you!

## 2025-01-31 NOTE — PROGRESS NOTES
[facility]  [address], [place] [address]  [phone]    Silver Linn MD, FACOG  NADIR Gooden-BC    Assessment/Plan     Patient Active Problem List    Diagnosis Date Noted    Sedative, hypnotic or anxiolytic use, unspecified with unspecified sedative, hypnotic or anxiolytic-induced disorder (HCC) 02/09/2023     Priority: Medium    Atherosclerosis of aorta (HCC) 02/09/2023     Priority: Medium    Atrophic vulvovaginitis 01/31/2025     Overview Note:     noted       Assessment & Plan Note:     All previous notes, labs and/or imaging performed by prev Gyn, PCP were reviewed and confirmed with pt today.  I interpreted these results and D/W pt my opinion and recommendations accordingly.   D/W meds and usage (2x week for E2 and steroid)  Path benign - hydration also indicated      Mixed incontinence urge and stress 01/31/2025     Overview Note:     noted       Assessment & Plan Note:     D/w her importance of Kegels (info sheet given)  Consider anticholinergic if fails      Essential hypertension, benign      Overview Note:     Sep 2021- EF 55-60%, impaired relaxation, normal valves  Normal stress perfusion study and EF        Anxiety     Carpal tunnel syndrome     Class 1 obesity due to excess calories with serious comorbidity and body mass index (BMI) of 32.0 to 32.9 in adult 08/20/2019    Pure hypercholesterolemia 08/11/2016    Primary insomnia 08/11/2016       Problem List Items Addressed This Visit       Mixed incontinence urge and stress     D/w her importance of Kegels (info sheet given)  Consider anticholinergic if fails         Atrophic vulvovaginitis - Primary     All previous notes, labs and/or imaging performed by prev Gyn, PCP were reviewed and confirmed with pt today.  I interpreted these results and D/W pt my opinion and recommendations accordingly.   D/W meds and usage (2x week for E2 and steroid)  Path benign - hydration also indicated             Subjective     LAST

## 2025-02-26 ENCOUNTER — OFFICE VISIT (OUTPATIENT)
Dept: FAMILY MEDICINE CLINIC | Facility: CLINIC | Age: 70
End: 2025-02-26
Payer: MEDICARE

## 2025-02-26 VITALS
SYSTOLIC BLOOD PRESSURE: 130 MMHG | BODY MASS INDEX: 31.1 KG/M2 | DIASTOLIC BLOOD PRESSURE: 82 MMHG | TEMPERATURE: 98 F | WEIGHT: 169 LBS | HEIGHT: 62 IN | HEART RATE: 71 BPM | RESPIRATION RATE: 18 BRPM | OXYGEN SATURATION: 96 %

## 2025-02-26 DIAGNOSIS — F13.99 SEDATIVE, HYPNOTIC OR ANXIOLYTIC USE, UNSPECIFIED WITH UNSPECIFIED SEDATIVE, HYPNOTIC OR ANXIOLYTIC-INDUCED DISORDER (HCC): ICD-10-CM

## 2025-02-26 DIAGNOSIS — F51.01 PRIMARY INSOMNIA: ICD-10-CM

## 2025-02-26 DIAGNOSIS — Z13.1 ENCOUNTER FOR SCREENING FOR DIABETES MELLITUS: ICD-10-CM

## 2025-02-26 DIAGNOSIS — I10 ESSENTIAL HYPERTENSION, BENIGN: Primary | ICD-10-CM

## 2025-02-26 DIAGNOSIS — E78.00 PURE HYPERCHOLESTEROLEMIA: ICD-10-CM

## 2025-02-26 LAB
ALBUMIN SERPL-MCNC: 4.2 G/DL (ref 3.2–4.6)
ALBUMIN/GLOB SERPL: 1.4 (ref 1–1.9)
ALP SERPL-CCNC: 58 U/L (ref 35–104)
ALT SERPL-CCNC: 22 U/L (ref 8–45)
ANION GAP SERPL CALC-SCNC: 13 MMOL/L (ref 7–16)
AST SERPL-CCNC: 25 U/L (ref 15–37)
BASOPHILS # BLD: 0.05 K/UL (ref 0–0.2)
BASOPHILS NFR BLD: 0.7 % (ref 0–2)
BILIRUB SERPL-MCNC: 0.6 MG/DL (ref 0–1.2)
BUN SERPL-MCNC: 13 MG/DL (ref 8–23)
CALCIUM SERPL-MCNC: 9.4 MG/DL (ref 8.8–10.2)
CHLORIDE SERPL-SCNC: 105 MMOL/L (ref 98–107)
CHOLEST SERPL-MCNC: 129 MG/DL (ref 0–200)
CO2 SERPL-SCNC: 23 MMOL/L (ref 20–29)
CREAT SERPL-MCNC: 0.84 MG/DL (ref 0.6–1.1)
DIFFERENTIAL METHOD BLD: NORMAL
EOSINOPHIL # BLD: 0.18 K/UL (ref 0–0.8)
EOSINOPHIL NFR BLD: 2.5 % (ref 0.5–7.8)
ERYTHROCYTE [DISTWIDTH] IN BLOOD BY AUTOMATED COUNT: 11.9 % (ref 11.9–14.6)
GLOBULIN SER CALC-MCNC: 2.9 G/DL (ref 2.3–3.5)
GLUCOSE SERPL-MCNC: 96 MG/DL (ref 70–99)
HCT VFR BLD AUTO: 42.7 % (ref 35.8–46.3)
HDLC SERPL-MCNC: 49 MG/DL (ref 40–60)
HDLC SERPL: 2.6 (ref 0–5)
HGB BLD-MCNC: 14.5 G/DL (ref 11.7–15.4)
IMM GRANULOCYTES # BLD AUTO: 0.03 K/UL (ref 0–0.5)
IMM GRANULOCYTES NFR BLD AUTO: 0.4 % (ref 0–5)
LDLC SERPL CALC-MCNC: 67 MG/DL (ref 0–100)
LYMPHOCYTES # BLD: 1.94 K/UL (ref 0.5–4.6)
LYMPHOCYTES NFR BLD: 26.9 % (ref 13–44)
MCH RBC QN AUTO: 32 PG (ref 26.1–32.9)
MCHC RBC AUTO-ENTMCNC: 34 G/DL (ref 31.4–35)
MCV RBC AUTO: 94.3 FL (ref 82–102)
MONOCYTES # BLD: 0.5 K/UL (ref 0.1–1.3)
MONOCYTES NFR BLD: 6.9 % (ref 4–12)
NEUTS SEG # BLD: 4.52 K/UL (ref 1.7–8.2)
NEUTS SEG NFR BLD: 62.6 % (ref 43–78)
NRBC # BLD: 0 K/UL (ref 0–0.2)
PLATELET # BLD AUTO: 243 K/UL (ref 150–450)
PMV BLD AUTO: 10.5 FL (ref 9.4–12.3)
POTASSIUM SERPL-SCNC: 4.3 MMOL/L (ref 3.5–5.1)
PROT SERPL-MCNC: 7.1 G/DL (ref 6.3–8.2)
RBC # BLD AUTO: 4.53 M/UL (ref 4.05–5.2)
SODIUM SERPL-SCNC: 141 MMOL/L (ref 136–145)
TRIGL SERPL-MCNC: 69 MG/DL (ref 0–150)
VLDLC SERPL CALC-MCNC: 14 MG/DL (ref 6–23)
WBC # BLD AUTO: 7.2 K/UL (ref 4.3–11.1)

## 2025-02-26 PROCEDURE — G2211 COMPLEX E/M VISIT ADD ON: HCPCS | Performed by: FAMILY MEDICINE

## 2025-02-26 PROCEDURE — 1123F ACP DISCUSS/DSCN MKR DOCD: CPT | Performed by: FAMILY MEDICINE

## 2025-02-26 PROCEDURE — 1036F TOBACCO NON-USER: CPT | Performed by: FAMILY MEDICINE

## 2025-02-26 PROCEDURE — G8399 PT W/DXA RESULTS DOCUMENT: HCPCS | Performed by: FAMILY MEDICINE

## 2025-02-26 PROCEDURE — 3075F SYST BP GE 130 - 139MM HG: CPT | Performed by: FAMILY MEDICINE

## 2025-02-26 PROCEDURE — G8417 CALC BMI ABV UP PARAM F/U: HCPCS | Performed by: FAMILY MEDICINE

## 2025-02-26 PROCEDURE — 99214 OFFICE O/P EST MOD 30 MIN: CPT | Performed by: FAMILY MEDICINE

## 2025-02-26 PROCEDURE — 3017F COLORECTAL CA SCREEN DOC REV: CPT | Performed by: FAMILY MEDICINE

## 2025-02-26 PROCEDURE — 1090F PRES/ABSN URINE INCON ASSESS: CPT | Performed by: FAMILY MEDICINE

## 2025-02-26 PROCEDURE — 3079F DIAST BP 80-89 MM HG: CPT | Performed by: FAMILY MEDICINE

## 2025-02-26 PROCEDURE — G8428 CUR MEDS NOT DOCUMENT: HCPCS | Performed by: FAMILY MEDICINE

## 2025-02-26 RX ORDER — TEMAZEPAM 15 MG/1
30 CAPSULE ORAL NIGHTLY PRN
Qty: 180 CAPSULE | Refills: 1 | Status: SHIPPED | OUTPATIENT
Start: 2025-02-26 | End: 2025-08-25

## 2025-02-26 SDOH — ECONOMIC STABILITY: FOOD INSECURITY: WITHIN THE PAST 12 MONTHS, THE FOOD YOU BOUGHT JUST DIDN'T LAST AND YOU DIDN'T HAVE MONEY TO GET MORE.: NEVER TRUE

## 2025-02-26 SDOH — ECONOMIC STABILITY: FOOD INSECURITY: WITHIN THE PAST 12 MONTHS, YOU WORRIED THAT YOUR FOOD WOULD RUN OUT BEFORE YOU GOT MONEY TO BUY MORE.: NEVER TRUE

## 2025-02-26 ASSESSMENT — ENCOUNTER SYMPTOMS
COUGH: 0
VOMITING: 0
NAUSEA: 0
DIARRHEA: 0
SHORTNESS OF BREATH: 0

## 2025-02-26 ASSESSMENT — PATIENT HEALTH QUESTIONNAIRE - PHQ9
SUM OF ALL RESPONSES TO PHQ QUESTIONS 1-9: 0
SUM OF ALL RESPONSES TO PHQ9 QUESTIONS 1 & 2: 0
SUM OF ALL RESPONSES TO PHQ QUESTIONS 1-9: 0
1. LITTLE INTEREST OR PLEASURE IN DOING THINGS: NOT AT ALL
2. FEELING DOWN, DEPRESSED OR HOPELESS: NOT AT ALL

## 2025-02-26 NOTE — PROGRESS NOTES
[facility]  [id number] [place] [address]  [phone]    Paula Zhang, : 1955, AGE: 69 y.o.  Chief Complaint   Patient presents with    Follow-up     Chronic care follow up. Fasting.     Hypertension     Well-controlled    Cholesterol Problem     Repeat labs    Other     Still having vaginal irritation. Was seen by GYN on 25.      Vitals:    25 1001   BP: 130/82   Pulse: 71   Resp: 18   Temp: 98 °F (36.7 °C)   SpO2: 96%     Social History     Tobacco Use    Smoking status: Never    Smokeless tobacco: Never   Vaping Use    Vaping status: Never Used   Substance Use Topics    Alcohol use: Yes     Alcohol/week: 1.0 standard drink of alcohol    Drug use: No     Current Outpatient Medications   Medication Sig Dispense Refill    temazepam (RESTORIL) 15 MG capsule Take 2 capsules by mouth nightly as needed for Sleep for up to 180 days. 2 poqhs Max Daily Amount: 30 mg 180 capsule 1    clobetasol (TEMOVATE) 0.05 % cream Apply topically 3 times daily. 30 g 3    rosuvastatin (CRESTOR) 10 MG tablet Take 1 tablet by mouth at bedtime 90 tablet 3    losartan (COZAAR) 100 MG tablet Take 1 tablet by mouth daily TAKE 1 TABLET BY MOUTH EVERY DAY 90 tablet 3    estrogens conjugated (PREMARIN) 0.625 MG/GM CREA vaginal cream Place 1.5 g vaginally three times a week 85 g 3     No current facility-administered medications for this visit.     No Known Allergies  Health Maintenance Topics with due status: Overdue       Topic Date Due    Diabetes screen Never done    Colorectal Cancer Screen 2024       HPI   History of Present Illness  History of Present Illness  The patient is a 69-year-old white female who presents today for a chronic care follow-up.    She has hypertension, which has been well controlled on losartan 100 mg daily.    She has hyperlipidemia and is currently on rosuvastatin 10 mg at bedtime. She is fasting for labs.    She also follows up on insomnia,

## 2025-05-20 ENCOUNTER — OFFICE VISIT (OUTPATIENT)
Dept: OBGYN CLINIC | Age: 70
End: 2025-05-20
Payer: MEDICARE

## 2025-05-20 VITALS
DIASTOLIC BLOOD PRESSURE: 72 MMHG | SYSTOLIC BLOOD PRESSURE: 130 MMHG | BODY MASS INDEX: 30.18 KG/M2 | HEIGHT: 62 IN | WEIGHT: 164 LBS

## 2025-05-20 DIAGNOSIS — N95.2 VAGINAL ATROPHY: ICD-10-CM

## 2025-05-20 DIAGNOSIS — N95.2 ATROPHIC VULVOVAGINITIS: Primary | ICD-10-CM

## 2025-05-20 DIAGNOSIS — N39.46 MIXED INCONTINENCE URGE AND STRESS: ICD-10-CM

## 2025-05-20 DIAGNOSIS — R23.4 FISSURE IN SKIN: ICD-10-CM

## 2025-05-20 PROCEDURE — 3078F DIAST BP <80 MM HG: CPT | Performed by: OBSTETRICS & GYNECOLOGY

## 2025-05-20 PROCEDURE — G8427 DOCREV CUR MEDS BY ELIG CLIN: HCPCS | Performed by: OBSTETRICS & GYNECOLOGY

## 2025-05-20 PROCEDURE — 1036F TOBACCO NON-USER: CPT | Performed by: OBSTETRICS & GYNECOLOGY

## 2025-05-20 PROCEDURE — 3075F SYST BP GE 130 - 139MM HG: CPT | Performed by: OBSTETRICS & GYNECOLOGY

## 2025-05-20 PROCEDURE — 99214 OFFICE O/P EST MOD 30 MIN: CPT | Performed by: OBSTETRICS & GYNECOLOGY

## 2025-05-20 PROCEDURE — G8417 CALC BMI ABV UP PARAM F/U: HCPCS | Performed by: OBSTETRICS & GYNECOLOGY

## 2025-05-20 PROCEDURE — 1123F ACP DISCUSS/DSCN MKR DOCD: CPT | Performed by: OBSTETRICS & GYNECOLOGY

## 2025-05-20 PROCEDURE — 1159F MED LIST DOCD IN RCRD: CPT | Performed by: OBSTETRICS & GYNECOLOGY

## 2025-05-20 PROCEDURE — 3017F COLORECTAL CA SCREEN DOC REV: CPT | Performed by: OBSTETRICS & GYNECOLOGY

## 2025-05-20 PROCEDURE — 99459 PELVIC EXAMINATION: CPT | Performed by: OBSTETRICS & GYNECOLOGY

## 2025-05-20 PROCEDURE — 1090F PRES/ABSN URINE INCON ASSESS: CPT | Performed by: OBSTETRICS & GYNECOLOGY

## 2025-05-20 PROCEDURE — G8399 PT W/DXA RESULTS DOCUMENT: HCPCS | Performed by: OBSTETRICS & GYNECOLOGY

## 2025-05-20 PROCEDURE — 0509F URINE INCON PLAN DOCD: CPT | Performed by: OBSTETRICS & GYNECOLOGY

## 2025-05-20 RX ORDER — CLOBETASOL PROPIONATE 0.5 MG/G
CREAM TOPICAL 2 TIMES DAILY PRN
Qty: 45 G | Refills: 3 | Status: SHIPPED | OUTPATIENT
Start: 2025-05-20

## 2025-05-20 RX ORDER — CONJUGATED ESTROGENS 0.62 MG/G
1 CREAM VAGINAL
Qty: 85 G | Refills: 2 | Status: SHIPPED | OUTPATIENT
Start: 2025-05-21

## 2025-05-20 NOTE — ASSESSMENT & PLAN NOTE
Pt notes improvement with current med regimen (E2 cream and steroid)  Acetowhite changes have resolved - will cont, refills sent

## 2025-05-20 NOTE — PATIENT INSTRUCTIONS
Keep doing the treatments you are doing - they are working!  You can use A&D ointment for barrier if you want  Follow up as needed or next year for your yearly female exam.  Thanks for coming to see us today and letting us take care of you!

## 2025-05-20 NOTE — PROGRESS NOTES
[facility]  [address], [place] [address]  [phone]    Silver Linn MD, FACOG  NADIR Gooden-BC    Assessment/Plan     Patient Active Problem List    Diagnosis Date Noted    Sedative, hypnotic or anxiolytic use, unspecified with unspecified sedative, hypnotic or anxiolytic-induced disorder (HCC) 02/09/2023     Priority: Medium    Atherosclerosis of aorta 02/09/2023     Priority: Medium    Atrophic vulvovaginitis 01/31/2025     Overview Note:     noted       Assessment & Plan Note:     Pt notes improvement with current med regimen (E2 cream and steroid)  Acetowhite changes have resolved - will cont, refills sent      Mixed incontinence urge and stress 01/31/2025     Overview Note:     noted       Assessment & Plan Note:     Improved with Kegels - wishes to cont and avoid meds at this time      Essential hypertension, benign      Overview Note:     Sep 2021- EF 55-60%, impaired relaxation, normal valves  Normal stress perfusion study and EF        Anxiety     Carpal tunnel syndrome     Class 1 obesity due to excess calories with serious comorbidity and body mass index (BMI) of 32.0 to 32.9 in adult 08/20/2019    Pure hypercholesterolemia 08/11/2016    Primary insomnia 08/11/2016       Problem List Items Addressed This Visit       Mixed incontinence urge and stress    Improved with Kegels - wishes to cont and avoid meds at this time         Atrophic vulvovaginitis - Primary    Pt notes improvement with current med regimen (E2 cream and steroid)  Acetowhite changes have resolved - will cont, refills sent          Other Visit Diagnoses         Vaginal atrophy        Relevant Medications    estrogens conjugated (PREMARIN) 0.625 MG/GM CREA vaginal cream (Start on 5/21/2025)      Fissure in skin        Relevant Medications    clobetasol (TEMOVATE) 0.05 % cream             Subjective     LAST PAP:  4/23/2024, neg., HPV neg.      LAST MAMMO:  11/25/2024      LMP:  No LMP recorded.

## 2025-05-20 NOTE — PROGRESS NOTES
Patient here for gyn f/u regarding atrophic vulvovaginitis.  Patient states she used clobetasol cream which has helped alleviate her s/s.  She now uses it approx every 2-3 weeks now.     Patient also uses obie bees corn starch powder to help reduce moisture in groin/vaginal area.     Patient states she would also like refill of premarin and clobetasol.      LAST PAP:  4/23/2024, neg., HPV neg.     LAST MAMMO:  11/25/2024     LMP:  No LMP recorded. Patient is postmenopausal.    BIRTH CONTROL:  post menopausal status    TOBACCO USE:  No    FAMILY HISTORY OF:   Breast Cancer:  Yes   Ovarian Cancer:  No   Uterine Cancer:  No   Colon Cancer:  No    Vitals:    05/20/25 1339   BP: 130/72   BP Site: Left Upper Arm   Patient Position: Sitting   Weight: 74.4 kg (164 lb)   Height: 1.575 m (5' 2\")        RANDY BETTS RN  05/20/25  1:47 PM

## 2025-05-20 NOTE — PROGRESS NOTES
Chaperone for Intimate Exam     Chaperone was offer accepted as part of the rooming process    Chaperone: Lety Peguero

## 2025-05-30 ENCOUNTER — OFFICE VISIT (OUTPATIENT)
Dept: FAMILY MEDICINE CLINIC | Facility: CLINIC | Age: 70
End: 2025-05-30

## 2025-05-30 VITALS
HEART RATE: 71 BPM | DIASTOLIC BLOOD PRESSURE: 60 MMHG | OXYGEN SATURATION: 97 % | HEIGHT: 62 IN | SYSTOLIC BLOOD PRESSURE: 110 MMHG | TEMPERATURE: 98.3 F | WEIGHT: 163.4 LBS | BODY MASS INDEX: 30.07 KG/M2

## 2025-05-30 DIAGNOSIS — E78.00 PURE HYPERCHOLESTEROLEMIA: ICD-10-CM

## 2025-05-30 DIAGNOSIS — I70.0 ATHEROSCLEROSIS OF AORTA: ICD-10-CM

## 2025-05-30 DIAGNOSIS — I10 ESSENTIAL HYPERTENSION, BENIGN: ICD-10-CM

## 2025-05-30 DIAGNOSIS — F13.99 SEDATIVE, HYPNOTIC OR ANXIOLYTIC USE, UNSPECIFIED WITH UNSPECIFIED SEDATIVE, HYPNOTIC OR ANXIOLYTIC-INDUCED DISORDER (HCC): ICD-10-CM

## 2025-05-30 DIAGNOSIS — F51.01 PRIMARY INSOMNIA: ICD-10-CM

## 2025-05-30 LAB
ALBUMIN SERPL-MCNC: 3.8 G/DL (ref 3.2–4.6)
ALBUMIN/GLOB SERPL: 1.2 (ref 1–1.9)
ALP SERPL-CCNC: 58 U/L (ref 35–104)
ALT SERPL-CCNC: 22 U/L (ref 8–45)
ANION GAP SERPL CALC-SCNC: 11 MMOL/L (ref 7–16)
AST SERPL-CCNC: 23 U/L (ref 15–37)
BASOPHILS # BLD: 0.05 K/UL (ref 0–0.2)
BASOPHILS NFR BLD: 0.7 % (ref 0–2)
BILIRUB SERPL-MCNC: 0.5 MG/DL (ref 0–1.2)
BUN SERPL-MCNC: 15 MG/DL (ref 8–23)
CALCIUM SERPL-MCNC: 9.5 MG/DL (ref 8.8–10.2)
CHLORIDE SERPL-SCNC: 106 MMOL/L (ref 98–107)
CHOLEST SERPL-MCNC: 151 MG/DL (ref 0–200)
CO2 SERPL-SCNC: 23 MMOL/L (ref 20–29)
CREAT SERPL-MCNC: 0.99 MG/DL (ref 0.6–1.1)
DIFFERENTIAL METHOD BLD: NORMAL
EOSINOPHIL # BLD: 0.1 K/UL (ref 0–0.8)
EOSINOPHIL NFR BLD: 1.5 % (ref 0.5–7.8)
ERYTHROCYTE [DISTWIDTH] IN BLOOD BY AUTOMATED COUNT: 12.2 % (ref 11.9–14.6)
GLOBULIN SER CALC-MCNC: 3.1 G/DL (ref 2.3–3.5)
GLUCOSE SERPL-MCNC: 96 MG/DL (ref 70–99)
HCT VFR BLD AUTO: 43.3 % (ref 35.8–46.3)
HDLC SERPL-MCNC: 52 MG/DL (ref 40–60)
HDLC SERPL: 2.9 (ref 0–5)
HGB BLD-MCNC: 14.6 G/DL (ref 11.7–15.4)
IMM GRANULOCYTES # BLD AUTO: 0.02 K/UL (ref 0–0.5)
IMM GRANULOCYTES NFR BLD AUTO: 0.3 % (ref 0–5)
LDLC SERPL CALC-MCNC: 83 MG/DL (ref 0–100)
LYMPHOCYTES # BLD: 2.1 K/UL (ref 0.5–4.6)
LYMPHOCYTES NFR BLD: 30.8 % (ref 13–44)
MCH RBC QN AUTO: 32.4 PG (ref 26.1–32.9)
MCHC RBC AUTO-ENTMCNC: 33.7 G/DL (ref 31.4–35)
MCV RBC AUTO: 96 FL (ref 82–102)
MONOCYTES # BLD: 0.5 K/UL (ref 0.1–1.3)
MONOCYTES NFR BLD: 7.3 % (ref 4–12)
NEUTS SEG # BLD: 4.05 K/UL (ref 1.7–8.2)
NEUTS SEG NFR BLD: 59.4 % (ref 43–78)
NRBC # BLD: 0 K/UL (ref 0–0.2)
PLATELET # BLD AUTO: 252 K/UL (ref 150–450)
PMV BLD AUTO: 10.5 FL (ref 9.4–12.3)
POTASSIUM SERPL-SCNC: 4.4 MMOL/L (ref 3.5–5.1)
PROT SERPL-MCNC: 6.9 G/DL (ref 6.3–8.2)
RBC # BLD AUTO: 4.51 M/UL (ref 4.05–5.2)
SODIUM SERPL-SCNC: 141 MMOL/L (ref 136–145)
TRIGL SERPL-MCNC: 78 MG/DL (ref 0–150)
VLDLC SERPL CALC-MCNC: 16 MG/DL (ref 6–23)
WBC # BLD AUTO: 6.8 K/UL (ref 4.3–11.1)

## 2025-05-30 RX ORDER — LOSARTAN POTASSIUM 100 MG/1
100 TABLET ORAL DAILY
Qty: 90 TABLET | Refills: 3 | Status: SHIPPED | OUTPATIENT
Start: 2025-05-30

## 2025-05-30 RX ORDER — ROSUVASTATIN CALCIUM 10 MG/1
10 TABLET, COATED ORAL NIGHTLY
Qty: 90 TABLET | Refills: 3 | Status: SHIPPED | OUTPATIENT
Start: 2025-05-30

## 2025-05-30 RX ORDER — TEMAZEPAM 15 MG/1
30 CAPSULE ORAL NIGHTLY PRN
Qty: 180 CAPSULE | Refills: 1 | Status: SHIPPED | OUTPATIENT
Start: 2025-05-30 | End: 2025-11-26

## 2025-06-01 ENCOUNTER — RESULTS FOLLOW-UP (OUTPATIENT)
Dept: FAMILY MEDICINE CLINIC | Facility: CLINIC | Age: 70
End: 2025-06-01

## 2025-07-16 ENCOUNTER — COMMUNITY OUTREACH (OUTPATIENT)
Dept: FAMILY MEDICINE CLINIC | Facility: CLINIC | Age: 70
End: 2025-07-16

## 2025-08-29 ENCOUNTER — OFFICE VISIT (OUTPATIENT)
Dept: FAMILY MEDICINE CLINIC | Facility: CLINIC | Age: 70
End: 2025-08-29

## 2025-08-29 VITALS
TEMPERATURE: 98 F | OXYGEN SATURATION: 96 % | SYSTOLIC BLOOD PRESSURE: 116 MMHG | HEART RATE: 68 BPM | DIASTOLIC BLOOD PRESSURE: 80 MMHG | WEIGHT: 167 LBS | BODY MASS INDEX: 30.73 KG/M2 | HEIGHT: 62 IN

## 2025-08-29 DIAGNOSIS — I10 ESSENTIAL HYPERTENSION, BENIGN: Primary | ICD-10-CM

## 2025-08-29 DIAGNOSIS — E66.811 CLASS 1 OBESITY DUE TO EXCESS CALORIES WITH SERIOUS COMORBIDITY AND BODY MASS INDEX (BMI) OF 30.0 TO 30.9 IN ADULT: ICD-10-CM

## 2025-08-29 DIAGNOSIS — Z12.11 COLON CANCER SCREENING: ICD-10-CM

## 2025-08-29 DIAGNOSIS — F51.01 PRIMARY INSOMNIA: ICD-10-CM

## 2025-08-29 DIAGNOSIS — E78.00 PURE HYPERCHOLESTEROLEMIA: ICD-10-CM

## 2025-08-29 DIAGNOSIS — E66.09 CLASS 1 OBESITY DUE TO EXCESS CALORIES WITH SERIOUS COMORBIDITY AND BODY MASS INDEX (BMI) OF 30.0 TO 30.9 IN ADULT: ICD-10-CM

## 2025-08-29 LAB
ALBUMIN SERPL-MCNC: 4 G/DL (ref 3.2–4.6)
ALBUMIN/GLOB SERPL: 1.3 (ref 1–1.9)
ALP SERPL-CCNC: 56 U/L (ref 35–104)
ALT SERPL-CCNC: 23 U/L (ref 8–45)
ANION GAP SERPL CALC-SCNC: 10 MMOL/L (ref 7–16)
AST SERPL-CCNC: 22 U/L (ref 15–37)
BASOPHILS # BLD: 0.05 K/UL (ref 0–0.2)
BASOPHILS NFR BLD: 0.7 % (ref 0–2)
BILIRUB SERPL-MCNC: 0.6 MG/DL (ref 0–1.2)
BUN SERPL-MCNC: 21 MG/DL (ref 8–23)
CALCIUM SERPL-MCNC: 9.5 MG/DL (ref 8.8–10.2)
CHLORIDE SERPL-SCNC: 106 MMOL/L (ref 98–107)
CHOLEST SERPL-MCNC: 159 MG/DL (ref 0–200)
CO2 SERPL-SCNC: 24 MMOL/L (ref 20–29)
CREAT SERPL-MCNC: 0.81 MG/DL (ref 0.6–1.1)
DIFFERENTIAL METHOD BLD: NORMAL
EOSINOPHIL # BLD: 0.14 K/UL (ref 0–0.8)
EOSINOPHIL NFR BLD: 2.1 % (ref 0.5–7.8)
ERYTHROCYTE [DISTWIDTH] IN BLOOD BY AUTOMATED COUNT: 12 % (ref 11.9–14.6)
GLOBULIN SER CALC-MCNC: 2.9 G/DL (ref 2.3–3.5)
GLUCOSE SERPL-MCNC: 93 MG/DL (ref 70–99)
HCT VFR BLD AUTO: 41.6 % (ref 35.8–46.3)
HDLC SERPL-MCNC: 50 MG/DL (ref 40–60)
HDLC SERPL: 3.2 (ref 0–5)
HGB BLD-MCNC: 14.1 G/DL (ref 11.7–15.4)
IMM GRANULOCYTES # BLD AUTO: 0.02 K/UL (ref 0–0.5)
IMM GRANULOCYTES NFR BLD AUTO: 0.3 % (ref 0–5)
LDLC SERPL CALC-MCNC: 90 MG/DL (ref 0–100)
LYMPHOCYTES # BLD: 2.18 K/UL (ref 0.5–4.6)
LYMPHOCYTES NFR BLD: 32.1 % (ref 13–44)
MCH RBC QN AUTO: 32.6 PG (ref 26.1–32.9)
MCHC RBC AUTO-ENTMCNC: 33.9 G/DL (ref 31.4–35)
MCV RBC AUTO: 96.1 FL (ref 82–102)
MONOCYTES # BLD: 0.57 K/UL (ref 0.1–1.3)
MONOCYTES NFR BLD: 8.4 % (ref 4–12)
NEUTS SEG # BLD: 3.83 K/UL (ref 1.7–8.2)
NEUTS SEG NFR BLD: 56.4 % (ref 43–78)
NRBC # BLD: 0 K/UL (ref 0–0.2)
PLATELET # BLD AUTO: 248 K/UL (ref 150–450)
PMV BLD AUTO: 10.3 FL (ref 9.4–12.3)
POTASSIUM SERPL-SCNC: 4.2 MMOL/L (ref 3.5–5.1)
PROT SERPL-MCNC: 6.9 G/DL (ref 6.3–8.2)
RBC # BLD AUTO: 4.33 M/UL (ref 4.05–5.2)
SODIUM SERPL-SCNC: 141 MMOL/L (ref 136–145)
TRIGL SERPL-MCNC: 97 MG/DL (ref 0–150)
VLDLC SERPL CALC-MCNC: 19 MG/DL (ref 6–23)
WBC # BLD AUTO: 6.8 K/UL (ref 4.3–11.1)

## 2025-08-29 ASSESSMENT — PATIENT HEALTH QUESTIONNAIRE - PHQ9
1. LITTLE INTEREST OR PLEASURE IN DOING THINGS: NOT AT ALL
2. FEELING DOWN, DEPRESSED OR HOPELESS: NOT AT ALL
SUM OF ALL RESPONSES TO PHQ QUESTIONS 1-9: 0

## 2025-08-29 ASSESSMENT — ENCOUNTER SYMPTOMS
SHORTNESS OF BREATH: 0
COUGH: 0
DIARRHEA: 0
VOMITING: 0
NAUSEA: 0